# Patient Record
Sex: FEMALE | Race: BLACK OR AFRICAN AMERICAN | Employment: OTHER | URBAN - METROPOLITAN AREA
[De-identification: names, ages, dates, MRNs, and addresses within clinical notes are randomized per-mention and may not be internally consistent; named-entity substitution may affect disease eponyms.]

---

## 2017-02-02 ENCOUNTER — HOSPITAL ENCOUNTER (EMERGENCY)
Age: 37
Discharge: HOME | End: 2017-02-02
Attending: EMERGENCY MEDICINE
Payer: MEDICARE

## 2017-02-02 VITALS
HEIGHT: 62 IN | WEIGHT: 194 LBS | OXYGEN SATURATION: 96 % | RESPIRATION RATE: 16 BRPM | HEART RATE: 90 BPM | DIASTOLIC BLOOD PRESSURE: 97 MMHG | TEMPERATURE: 98.6 F | BODY MASS INDEX: 35.7 KG/M2 | SYSTOLIC BLOOD PRESSURE: 167 MMHG

## 2017-02-02 DIAGNOSIS — J06.9 ACUTE UPPER RESPIRATORY INFECTION: Primary | ICD-10-CM

## 2017-02-02 DIAGNOSIS — H66.90 ACUTE OTITIS MEDIA, UNSPECIFIED LATERALITY, UNSPECIFIED OTITIS MEDIA TYPE: ICD-10-CM

## 2017-02-02 LAB
DEPRECATED S PYO AG THROAT QL EIA: NEGATIVE
FLUAV AG NPH QL IA: NEGATIVE
FLUBV AG NOSE QL IA: NEGATIVE

## 2017-02-02 PROCEDURE — 87804 INFLUENZA ASSAY W/OPTIC: CPT | Performed by: PHYSICIAN ASSISTANT

## 2017-02-02 PROCEDURE — 99282 EMERGENCY DEPT VISIT SF MDM: CPT

## 2017-02-02 PROCEDURE — 87880 STREP A ASSAY W/OPTIC: CPT | Performed by: PHYSICIAN ASSISTANT

## 2017-02-02 PROCEDURE — 87070 CULTURE OTHR SPECIMN AEROBIC: CPT | Performed by: EMERGENCY MEDICINE

## 2017-02-02 RX ORDER — CETIRIZINE HCL 10 MG
10 TABLET ORAL DAILY
Qty: 20 TAB | Refills: 0 | Status: SHIPPED | OUTPATIENT
Start: 2017-02-02 | End: 2017-06-16

## 2017-02-02 RX ORDER — AMOXICILLIN 875 MG/1
875 TABLET, FILM COATED ORAL 2 TIMES DAILY
Qty: 20 TAB | Refills: 0 | Status: SHIPPED | OUTPATIENT
Start: 2017-02-02 | End: 2017-02-12

## 2017-02-02 NOTE — ED PROVIDER NOTES
Patient is a 29 y.o. female presenting with nasal congestion. The history is provided by the patient. Nasal Congestion   This is a new problem. Episode onset: Pt reports productive cough, nasal congestion, muffled ear, sore throat, myalgias x 3 days. Denies fever/chills. Pts children have recently been ill with URI sx. Pertinent negatives include no chest pain, no abdominal pain and no shortness of breath. She has tried nothing for the symptoms. Past Medical History:   Diagnosis Date    Abscess 7/11/2016    Delivery normal     Hypertension     Psychiatric disorder      depresssion    Tubal ligation status 12/10/15       Past Surgical History:   Procedure Laterality Date    Hx orthopaedic       tubal pregnancy removal    Hx gyn       tubal ligation         No family history on file. Social History     Social History    Marital status: UNKNOWN     Spouse name: N/A    Number of children: N/A    Years of education: N/A     Occupational History    Not on file. Social History Main Topics    Smoking status: Current Some Day Smoker     Packs/day: 0.25    Smokeless tobacco: Not on file    Alcohol use Yes      Comment: socially    Drug use: No    Sexual activity: No     Other Topics Concern    Not on file     Social History Narrative         ALLERGIES: Iodine and Shellfish containing products    Review of Systems   Constitutional: Negative for chills and fever. HENT: Positive for congestion, ear pain and sore throat. Negative for ear discharge, facial swelling, postnasal drip, rhinorrhea, sinus pressure, sneezing and trouble swallowing. Eyes: Negative for photophobia, pain and visual disturbance. Respiratory: Negative for shortness of breath. Cardiovascular: Negative for chest pain. Gastrointestinal: Negative for abdominal pain, nausea and vomiting. Genitourinary: Negative for flank pain. Musculoskeletal: Positive for myalgias. Negative for back pain.    Skin: Negative for color change, pallor, rash and wound. Neurological: Negative for dizziness, weakness and light-headedness. All other systems reviewed and are negative. Vitals:    02/02/17 1711   BP: (!) 167/97   Pulse: 90   Resp: 16   Temp: 98.6 °F (37 °C)   SpO2: 96%   Weight: 88 kg (194 lb)   Height: 5' 2\" (1.575 m)            Physical Exam   Constitutional: She is oriented to person, place, and time. She appears well-developed and well-nourished. No distress. HENT:   Head: Normocephalic and atraumatic. Right Ear: Tympanic membrane, external ear and ear canal normal.   Left Ear: No drainage, swelling or tenderness. Tympanic membrane is erythematous and bulging. Tympanic membrane is not injected, not scarred, not perforated and not retracted. No middle ear effusion. Decreased hearing is noted. Nose: Mucosal edema present. No rhinorrhea. Right sinus exhibits no maxillary sinus tenderness and no frontal sinus tenderness. Left sinus exhibits no maxillary sinus tenderness and no frontal sinus tenderness. Mouth/Throat: Uvula is midline and mucous membranes are normal. No trismus in the jaw. No uvula swelling. Posterior oropharyngeal erythema present. No oropharyngeal exudate, posterior oropharyngeal edema or tonsillar abscesses. Eyes: Conjunctivae are normal.   Cardiovascular: Normal rate, regular rhythm and normal heart sounds. Pulmonary/Chest: Effort normal and breath sounds normal. No respiratory distress. Abdominal: Soft. Bowel sounds are normal. She exhibits no distension. Musculoskeletal: Normal range of motion. Neurological: She is alert and oriented to person, place, and time. Skin: Skin is warm. No rash noted. Psychiatric: She has a normal mood and affect. Her behavior is normal.   Nursing note and vitals reviewed.        MDM  Number of Diagnoses or Management Options  Acute otitis media, unspecified laterality, unspecified otitis media type:   Acute upper respiratory infection:   Diagnosis management comments: DDx: URI, GSA vs viral pharyngitis, otitis media, otitis externa, bronchitis       Amount and/or Complexity of Data Reviewed  Clinical lab tests: ordered and reviewed      ED Course       Procedures      Chief Complaint   Patient presents with    Nasal Congestion     with productive cough, left ear muffled. sore throat. runny nose. low grade temp.            MEDICATIONS GIVEN:  Medications - No data to display    LABS REVIEWED:  Labs Reviewed   INFLUENZA A & B AG (RAPID TEST)   STREP AG SCREEN, GROUP A   CULTURE, THROAT         IMPRESSION:  1- URI  OM    PLAN:  1- Amoxicillin  Zyrtec

## 2017-02-02 NOTE — ED NOTES
Emergency Department Nursing Plan of Care       The Nursing Plan of Care is developed from the Nursing assessment and Emergency Department Attending provider initial evaluation. The plan of care may be reviewed in the ED Provider note.     The Plan of Care was developed with the following considerations:   Patient / Family readiness to learn indicated by:verbalized understanding  Persons(s) to be included in education: patient  Barriers to Learning/Limitations:No    [address] [facility]    2/2/2017   5:28 PM

## 2017-02-02 NOTE — ED NOTES
Pt arrives in the ED with complaints of nasal congestion, cough, left ear pain, and sore throat. PT denies fevers.

## 2017-02-02 NOTE — DISCHARGE INSTRUCTIONS
Upper Respiratory Infection (Cold): Care Instructions  Your Care Instructions    An upper respiratory infection, or URI, is an infection of the nose, sinuses, or throat. URIs are spread by coughs, sneezes, and direct contact. The common cold is the most frequent kind of URI. The flu and sinus infections are other kinds of URIs. Almost all URIs are caused by viruses. Antibiotics won't cure them. But you can treat most infections with home care. This may include drinking lots of fluids and taking over-the-counter pain medicine. You will probably feel better in 4 to 10 days. The doctor has checked you carefully, but problems can develop later. If you notice any problems or new symptoms, get medical treatment right away. Follow-up care is a key part of your treatment and safety. Be sure to make and go to all appointments, and call your doctor if you are having problems. It's also a good idea to know your test results and keep a list of the medicines you take. How can you care for yourself at home? · To prevent dehydration, drink plenty of fluids, enough so that your urine is light yellow or clear like water. Choose water and other caffeine-free clear liquids until you feel better. If you have kidney, heart, or liver disease and have to limit fluids, talk with your doctor before you increase the amount of fluids you drink. · Take an over-the-counter pain medicine, such as acetaminophen (Tylenol), ibuprofen (Advil, Motrin), or naproxen (Aleve). Read and follow all instructions on the label. · Before you use cough and cold medicines, check the label. These medicines may not be safe for young children or for people with certain health problems. · Be careful when taking over-the-counter cold or flu medicines and Tylenol at the same time. Many of these medicines have acetaminophen, which is Tylenol. Read the labels to make sure that you are not taking more than the recommended dose.  Too much acetaminophen (Tylenol) can be harmful. · Get plenty of rest.  · Do not smoke or allow others to smoke around you. If you need help quitting, talk to your doctor about stop-smoking programs and medicines. These can increase your chances of quitting for good. When should you call for help? Call 911 anytime you think you may need emergency care. For example, call if:  · You have severe trouble breathing. Call your doctor now or seek immediate medical care if:  · You seem to be getting much sicker. · You have new or worse trouble breathing. · You have a new or higher fever. · You have a new rash. Watch closely for changes in your health, and be sure to contact your doctor if:  · You have a new symptom, such as a sore throat, an earache, or sinus pain. · You cough more deeply or more often, especially if you notice more mucus or a change in the color of your mucus. · You do not get better as expected. Where can you learn more? Go to http://rachel-velma.info/. Enter D393 in the search box to learn more about \"Upper Respiratory Infection (Cold): Care Instructions. \"  Current as of: June 30, 2016  Content Version: 11.1  © 4848-2125 [employer]. Care instructions adapted under license by [employer] (which disclaims liability or warranty for this information). If you have questions about a medical condition or this instruction, always ask your healthcare professional. [place] [address] any warranty or liability for your use of this information. Ear Infection (Otitis Media): Care Instructions  Your Care Instructions    An ear infection may start with a cold and affect the middle ear (otitis media). It can hurt a lot. Most ear infections clear up on their own in a couple of days. Most often you will not need antibiotics. This is because many ear infections are caused by a virus. Antibiotics don't work against a virus.  Regular doses of pain medicines are the best way to reduce your fever and help you feel better. Follow-up care is a key part of your treatment and safety. Be sure to make and go to all appointments, and call your doctor if you are having problems. It's also a good idea to know your test results and keep a list of the medicines you take. How can you care for yourself at home? · Take pain medicines exactly as directed. ¨ If the doctor gave you a prescription medicine for pain, take it as prescribed. ¨ If you are not taking a prescription pain medicine, take an over-the-counter medicine, such as acetaminophen (Tylenol), ibuprofen (Advil, Motrin), or naproxen (Aleve). Read and follow all instructions on the label. ¨ Do not take two or more pain medicines at the same time unless the doctor told you to. Many pain medicines have acetaminophen, which is Tylenol. Too much acetaminophen (Tylenol) can be harmful. · Plan to take a full dose of pain reliever before bedtime. Getting enough sleep will help you get better. · Try a warm, moist washcloth on the ear. It may help relieve pain. · If your doctor prescribed antibiotics, take them as directed. Do not stop taking them just because you feel better. You need to take the full course of antibiotics. When should you call for help? Call your doctor now or seek immediate medical care if:  · You have new or increasing ear pain. · You have new or increasing pus or blood draining from your ear. · You have a fever with a stiff neck or a severe headache. Watch closely for changes in your health, and be sure to contact your doctor if:  · You have new or worse symptoms. · You are not getting better after taking an antibiotic for 2 days. Where can you learn more? Go to http://rachel-velma.info/. Enter L590 in the search box to learn more about \"Ear Infection (Otitis Media): Care Instructions. \"  Current as of: July 29, 2016  Content Version: 11.1  © 3804-1739 [employer].  Care instructions adapted under license by [employer] (which disclaims liability or warranty for this information). If you have questions about a medical condition or this instruction, always ask your healthcare professional. [address] any warranty or liability for your use of this information.

## 2017-02-04 LAB
BACTERIA SPEC CULT: NORMAL
SERVICE CMNT-IMP: NORMAL

## 2017-02-28 ENCOUNTER — DOCUMENTATION ONLY (OUTPATIENT)
Dept: BEHAVIORAL/MENTAL HEALTH CLINIC | Age: 37
End: 2017-02-28

## 2017-02-28 NOTE — LETTER
2/28/2017 Ms. Gretta Horvath 
[address] [facility] [place] [facility] [id number] Dear Ms. Andrez Reaves, 
 
A good relationship between provider and patient is essential for quality medical care. The provider/patient relationship depends upon mutual trust, respect, and rapport. Our relationship has reached a point where these criteria are no longer intact. You have consistently failed to come in for scheduled appointments (No Show: 12/27/16, 2/28/17). For this reasons, I will no longer continue to serve as your provider and [address] must withdraw from your medical care and treatment effective this date. Although I have terminated our providerpatient relationship, I will assist you with the transition of your health care to other providers, as follows: 
 
1. On-Going/Acute Care. I will provide on-going/acute care for you for a period of 30 days, but in no case later than 3/29/17. After this date, you may seek care with another provider or your local emergency room. 2. Referrals for Future Medical Care. As you may need medical attention in the future, I recommend that you promptly find another provider to care for you. You may require ongoing medical attention for any current or future medical conditions, including but not limited to the following: depression/ anxiety. You may contact your insurance company or the [employer]  for a [employer] [facility] [employer] of physicians who are accepting new patients 3. Medical Records. I will provide you or your new health care provider with a copy of your records upon your request.  Since your records are confidential, Ill require your written authorization to make them available to another provider. Enclosed is an authorization form. Please complete it and return it to me so that we may transition your care. I extend to you my best wishes for your future health.  
 
Sincerely, 
 
 
 
 Elta Ormond, NP [place]

## 2017-03-16 ENCOUNTER — OFFICE VISIT (OUTPATIENT)
Dept: CARDIOLOGY CLINIC | Age: 37
End: 2017-03-16

## 2017-03-16 VITALS
OXYGEN SATURATION: 99 % | DIASTOLIC BLOOD PRESSURE: 99 MMHG | SYSTOLIC BLOOD PRESSURE: 143 MMHG | BODY MASS INDEX: 36.07 KG/M2 | WEIGHT: 196 LBS | HEIGHT: 62 IN | HEART RATE: 70 BPM

## 2017-03-16 DIAGNOSIS — R07.9 CHEST PAIN, UNSPECIFIED TYPE: ICD-10-CM

## 2017-03-16 DIAGNOSIS — I51.7 LEFT VENTRICULAR HYPERTROPHY: ICD-10-CM

## 2017-03-16 DIAGNOSIS — I10 ESSENTIAL HYPERTENSION: Primary | ICD-10-CM

## 2017-03-16 RX ORDER — LABETALOL 100 MG/1
100 TABLET, FILM COATED ORAL 2 TIMES DAILY
Qty: 60 TAB | Refills: 1 | Status: SHIPPED | OUTPATIENT
Start: 2017-03-16 | End: 2017-09-08 | Stop reason: SDUPTHER

## 2017-03-16 RX ORDER — BUDESONIDE AND FORMOTEROL FUMARATE DIHYDRATE 80; 4.5 UG/1; UG/1
AEROSOL RESPIRATORY (INHALATION)
Refills: 0 | COMMUNITY
Start: 2017-02-17 | End: 2017-06-16

## 2017-03-16 RX ORDER — AMOXICILLIN AND CLAVULANATE POTASSIUM 875; 125 MG/1; MG/1
TABLET, FILM COATED ORAL
Refills: 0 | COMMUNITY
Start: 2017-02-16 | End: 2017-04-19 | Stop reason: ALTCHOICE

## 2017-03-16 NOTE — MR AVS SNAPSHOT
Visit Information Date & Time Provider Department Dept. Phone Encounter #  
 3/16/2017  9:20 AM Nirmal Jennings MD [facility] [id number] Upcoming Health Maintenance Date Due Pneumococcal 19-64 Medium Risk (1 of 1 - PPSV23) 2/26/2000 PAP AKA CERVICAL CYTOLOGY 2/26/2002 INFLUENZA AGE 9 TO ADULT 8/1/2016 DTaP/Tdap/Td series (2 - Td) 12/10/2025 Allergies as of 3/16/2017  Review Complete On: 3/16/2017 By: Isabela Deras Severity Noted Reaction Type Reactions Iodine  07/13/2010    Itching Shellfish Containing Products  07/13/2010    Itching Current Immunizations  Never Reviewed Name Date  
 TD Vaccine 7/27/2011  1:09 AM  
 Tdap 12/10/2015  2:53 PM  
  
 Not reviewed this visit You Were Diagnosed With   
  
 Codes Comments Essential hypertension    -  Primary ICD-10-CM: I10 
ICD-9-CM: 401.9 Chest pain, unspecified type     ICD-10-CM: R07.9 ICD-9-CM: 786.50 Left ventricular hypertrophy     ICD-10-CM: I51.7 ICD-9-CM: 429.3 Vitals BP Pulse Height(growth percentile) Weight(growth percentile) SpO2 BMI  
 (!) 143/99 70 5' 2\" (1.575 m) 196 lb (88.9 kg) 99% 35.85 kg/m2 OB Status Smoking Status Having regular periods Current Some Day Smoker Vitals History BMI and BSA Data Body Mass Index Body Surface Area  
 35.85 kg/m 2 1.97 m 2 Preferred Pharmacy Pharmacy Name Phone [address] [phone] Your Updated Medication List  
  
   
This list is accurate as of: 3/16/17 10:56 AM.  Always use your most recent med list. amLODIPine 10 mg tablet Commonly known as:  Love Breach Take 1 Tab by mouth daily. amoxicillin-clavulanate 875-125 mg per tablet Commonly known as:  AUGMENTIN  
take 1 tablet by mouth every 12 hours for 10 days  
  
 aspirin delayed-release 81 mg tablet Take  by mouth daily. cetirizine 10 mg tablet Commonly known as:  ZyrTEC Take 1 Tab by mouth daily. cloNIDine HCl 0.2 mg tablet Commonly known as:  CATAPRES Take 1 Tab by mouth two (2) times a day. Indications: Hypertension Ferrous Fumarate 325 mg (106 mg iron) Tab Take 325 mg by mouth. FISH OIL 1,000 mg Cap Generic drug:  omega-3 fatty acids-vitamin e Take 1 Cap by mouth daily. lidocaine 5 % ointment Commonly known as:  XYLOCAINE Apply  to affected area two (2) times a day. losartan 100 mg tablet Commonly known as:  COZAAR Take 1 Tab by mouth daily. Indications: Hypertension PROAIR HFA 90 mcg/actuation inhaler Generic drug:  albuterol  
inhale 2 puffs by mouth every 4 hours Q-TUSSIN -10 mg/5 mL syrup Generic drug:  guaiFENesin-dextromethorphan  
take 10 milliliters by mouth every 4 hours for 10 days SYMBICORT 80-4.5 mcg/actuation Hfaa inhaler Generic drug:  budesonide-formoterol  
  
 triamcinolone acetonide 0.1 % ointment Commonly known as:  KENALOG  
apply to affected area twice a day if needed for eczema We Performed the Following AMB POC EKG ROUTINE W/ 12 LEADS, INTER & REP [87098 CPT(R)] Introducing [facility] SERVICES! [employer] introduces [employer] patient portal. Now you can access parts of your medical record, email your doctor's office, and request medication refills online. 1. In your internet browser, go to [email or web address] 2. Click on the First Time User? Click Here link in the Sign In box. You will see the New Member Sign Up page. 3. Enter your [employer] Access Code exactly as it appears below. You will not need to use this code after youve completed the sign-up process. If you do not sign up before the expiration date, you must request a new code. · [employer] Access Code: [email or web address] Expires: 5/7/2017 12:55 PM 
 
 4. Enter the last four digits of your Social Security Number (xxxx) and Date of Birth (mm/dd/yyyy) as indicated and click Submit. You will be taken to the next sign-up page. 5. Create a Snapwiz ID. This will be your Snapwiz login ID and cannot be changed, so think of one that is secure and easy to remember. 6. Create a Snapwiz password. You can change your password at any time. 7. Enter your Password Reset Question and Answer. This can be used at a later time if you forget your password. 8. Enter your e-mail address. You will receive e-mail notification when new information is available in [address] 9. Click Sign Up. You can now view and download portions of your medical record. 10. Click the Download Summary menu link to download a portable copy of your medical information. If you have questions, please visit the Frequently Asked Questions section of the Snapwiz website. Remember, Snapwiz is NOT to be used for urgent needs. For medical emergencies, dial 911. Now available from your iPhone and Android! Please provide this summary of care documentation to your next provider. Your primary care clinician is listed as Harry Gamboa. If you have any questions after today's visit, please call [phone].

## 2017-03-16 NOTE — PROGRESS NOTES
[facility]   [address] [facility], [address] [place] [address]                                          NEW PATIENT HPI/FOLLOW-UP      NAME:  Sharmaine Carrel   :   1980   MRN:   [id number]   PCP:  Rashad Sam MD           Subjective: The patient is a 40y.o. year old female  who returns for a routine follow-up. Since the last visit, patient reports BPs are consistently in 160s/90s range. Lowest BPs are 140s/90s. She reports compliance on meds. She reports headache and nosebleeds when BP are in 528D systolic. BP clearly not adequately controlled. Still has intermittent CP that is constant, sharp and lasts for 2-3 days at a time. She did not present to ER with most recent episode. States by day three of the chest pain she was convinced to go and the pain suddenly stopped. She tried no interventions. She denies exacerbating or alleviating factors. She reports associated SOB and ankle edema. Denies change in exercise tolerance, medication intolerance, palpitations, PND/orthopnea wheezing, sputum, syncope, dizziness or light headedness. Doing satisfactorily. Review of Systems  General: Pt denies excessive weight gain or loss. Pt is able to conduct ADL's. Respiratory: +shortness of breath, Denies SMITH, wheezing or stridor.   Cardiovascular: +precordial pain, Denies palpitations, edema or PND  Gastrointestinal: Denies poor appetite, indigestion, abdominal pain or blood in stool  Peripheral vascular: Denies claudication, leg cramps  Neuropsychiatric: Denies paresthesias,tingling,numbness,anxiety,depression,fatigue  Musculoskeletal: Denies pain,tenderness, soreness,swelling      Past Medical History:   Diagnosis Date    Abscess 2016    Delivery normal     Hypertension     Psychiatric disorder     depresssion    Tubal ligation status 12/10/15     Patient Active Problem List    Diagnosis Date Noted    Chest wall pain 2016    Essential hypertension 2016    [facility] 11/03/2016    Alcohol abuse 11/03/2016    Cannabis abuse 11/03/2016    Abscess 07/11/2016      Past Surgical History:   Procedure Laterality Date    HX GYN      tubal ligation    HX ORTHOPAEDIC      tubal pregnancy removal     Allergies   Allergen Reactions    Iodine Itching    Shellfish Containing Products Itching      No family history on file. Social History     Social History    Marital status: UNKNOWN     Spouse name: N/A    Number of children: N/A    Years of education: N/A     Occupational History    Not on file. Social History Main Topics    Smoking status: Current Some Day Smoker     Packs/day: 0.25    Smokeless tobacco: Not on file    Alcohol use Yes      Comment: socially    Drug use: No    Sexual activity: No     Other Topics Concern    Not on file     Social History Narrative      Current Outpatient Prescriptions   Medication Sig    cetirizine (ZYRTEC) 10 mg tablet Take 1 Tab by mouth daily.  lidocaine (XYLOCAINE) 5 % ointment Apply  to affected area two (2) times a day.  cloNIDine HCl (CATAPRES) 0.2 mg tablet Take 1 Tab by mouth two (2) times a day. Indications: Hypertension    amLODIPine (NORVASC) 10 mg tablet Take 1 Tab by mouth daily.  losartan (COZAAR) 100 mg tablet Take 1 Tab by mouth daily. Indications: Hypertension    omega-3 fatty acids-vitamin e (FISH OIL) 1,000 mg cap Take 1 Cap by mouth daily.  aspirin delayed-release 81 mg tablet Take  by mouth daily.  PROAIR HFA 90 mcg/actuation inhaler inhale 2 puffs by mouth every 4 hours    triamcinolone acetonide (KENALOG) 0.1 % ointment apply to affected area twice a day if needed for eczema    Ferrous Fumarate 325 mg (106 mg iron) Tab Take 325 mg by mouth.       amoxicillin-clavulanate (AUGMENTIN) 875-125 mg per tablet take 1 tablet by mouth every 12 hours for 10 days    SYMBICORT 80-4.5 mcg/actuation HFAA inhaler     Q-TUSSIN DM  mg/5 mL syrup take 10 milliliters by mouth every 4 hours for 10 days     No current facility-administered medications for this visit. I have reviewed the MAs notes, vitals, problem list, allergy list, medical history, family medical, social history and medications. Objective:     Physical Exam:     Vitals:    03/16/17 0955   BP: (!) 143/99   Pulse: 70   SpO2: 99%   Weight: 196 lb (88.9 kg)   Height: 5' 2\" (1.575 m)    Body mass index is 35.85 kg/(m^2). General: Well developed, in no acute distress. HEENT: No carotid bruits, no JVD, trach is midline. Heart:  Normal S1/S2 negative S3 or S4. Regular, no murmur, gallop or rub.   Respiratory: Clear bilaterally, no wheezing or rales  Abdomen:   Soft, non-tender, bowel sounds are active.   Extremities:  No edema, normal cap refill, no cyanosis. Neuro: A&Ox3, speech clear, gait stable. Skin: Skin color is normal. No rashes or lesions. No diaphoresis. Vascular: 2+ pulses symmetric in all extremities        Data Review:       Cardiographics:    EKG: NSR, leftward axis; with LVH criteria, TWI may represent LVH strain.     Cardiology Labs:    Results for orders placed or performed during the hospital encounter of 11/06/16   EKG, 12 LEAD, INITIAL   Result Value Ref Range    Ventricular Rate 84 BPM    Atrial Rate 84 BPM    P-R Interval 156 ms    QRS Duration 82 ms    Q-T Interval 400 ms    QTC Calculation (Bezet) 472 ms    Calculated P Axis 36 degrees    Calculated R Axis 1 degrees    Calculated T Axis 135 degrees    Diagnosis       Normal sinus rhythm  Possible Left atrial enlargement  Left ventricular hypertrophy with repolarization abnormality  Cannot rule out Septal infarct , age undetermined    Confirmed by Andrez Peña ([id number]) on 11/7/2016 1:54:12 PM         No results found for: CHOL, CHOLX, CHLST, CHOLV, 299913, HDL, LDL, DLDL, LDLC, DLDLP, TGL, TGLX, TRIGL, [id number], TRIGP, CHHD, CHHDX    Lab Results   Component Value Date/Time    Sodium 138 11/06/2016 06:08 PM    Potassium 3.3 11/06/2016 06:08 PM    Chloride 105 11/06/2016 06:08 PM    CO2 24 11/06/2016 06:08 PM    Anion gap 9 11/06/2016 06:08 PM    Glucose 98 11/06/2016 06:08 PM    BUN 17 11/06/2016 06:08 PM    Creatinine 0.85 11/06/2016 06:08 PM    BUN/Creatinine ratio 20 11/06/2016 06:08 PM    GFR est AA >60 11/06/2016 06:08 PM    GFR est non-AA >60 11/06/2016 06:08 PM    Calcium 8.2 11/06/2016 06:08 PM    Bilirubin, total 0.3 11/06/2016 06:08 PM    AST (SGOT) 18 11/06/2016 06:08 PM    Alk. phosphatase 67 11/06/2016 06:08 PM    Protein, total 7.1 11/06/2016 06:08 PM    Albumin 3.4 11/06/2016 06:08 PM    Globulin 3.7 11/06/2016 06:08 PM    A-G Ratio 0.9 11/06/2016 06:08 PM    ALT (SGPT) 46 11/06/2016 06:08 PM          Assessment:       ICD-10-CM ICD-9-CM    1. Essential hypertension I10 401.9 AMB POC EKG ROUTINE W/ 12 LEADS, INTER & REP   2. Chest pain, unspecified type R07.9 786.50 AMB POC EKG ROUTINE W/ 12 LEADS, INTER & REP   3. Left ventricular hypertrophy I51.7 429.3          Discussion: Patient presents at this time stable from a cardiac perspective with BP still not adequately controlled. Plan:   Discussed with Dr. Mariana Licea    1. Continue same meds. Lipid profile and labs followed by PCP. -- ADD Labetalol 100 mg BID    2. Encouraged to exercise to tolerance, lose weight, stop smoking and follow low fat, low cholesterol, low sodium predominantly Plant-based (consider Mediterranean) diet. 3.Follow up: 1 month to reassess medications and BP   -- Call with questions or concerns. Will follow up any test results by phone and/or f/u here in office if needed. I have discussed the diagnosis with the patient and the intended plan as seen in the above orders. The patient has received an after-visit summary and questions were answered concerning future plans. I have discussed any concerning medication side effects and warnings with the patient as well.     Jassi Pepe PA-C  3/16/2017

## 2017-04-19 ENCOUNTER — OFFICE VISIT (OUTPATIENT)
Dept: CARDIOLOGY CLINIC | Age: 37
End: 2017-04-19

## 2017-04-19 VITALS
HEART RATE: 74 BPM | OXYGEN SATURATION: 99 % | DIASTOLIC BLOOD PRESSURE: 78 MMHG | BODY MASS INDEX: 36.07 KG/M2 | SYSTOLIC BLOOD PRESSURE: 134 MMHG | WEIGHT: 196 LBS | HEIGHT: 62 IN

## 2017-04-19 DIAGNOSIS — R06.02 SOB (SHORTNESS OF BREATH): ICD-10-CM

## 2017-04-19 DIAGNOSIS — I10 ESSENTIAL HYPERTENSION: Primary | ICD-10-CM

## 2017-04-19 DIAGNOSIS — R07.9 CHEST PAIN, UNSPECIFIED TYPE: ICD-10-CM

## 2017-04-19 RX ORDER — VALSARTAN 40 MG/1
160 TABLET ORAL DAILY
Qty: 30 TAB | Refills: 3 | Status: SHIPPED | OUTPATIENT
Start: 2017-04-19 | End: 2017-06-21 | Stop reason: SDUPTHER

## 2017-04-19 NOTE — PATIENT INSTRUCTIONS
-- STOP Amlodipine and Losartan  -- START a new medication called Diovan  -- Call us with any high blood pressure readings, otherwise please make a 1 month follow up         DASH Diet: Care Instructions  Your Care Instructions  The DASH diet is an eating plan that can help lower your blood pressure. DASH stands for Dietary Approaches to Stop Hypertension. Hypertension is high blood pressure. The DASH diet focuses on eating foods that are high in calcium, potassium, and magnesium. These nutrients can lower blood pressure. The foods that are highest in these nutrients are fruits, vegetables, low-fat dairy products, nuts, seeds, and legumes. But taking calcium, potassium, and magnesium supplements instead of eating foods that are high in those nutrients does not have the same effect. The DASH diet also includes whole grains, fish, and poultry. The DASH diet is one of several lifestyle changes your doctor may recommend to lower your high blood pressure. Your doctor may also want you to decrease the amount of sodium in your diet. Lowering sodium while following the DASH diet can lower blood pressure even further than just the DASH diet alone. Follow-up care is a key part of your treatment and safety. Be sure to make and go to all appointments, and call your doctor if you are having problems. It's also a good idea to know your test results and keep a list of the medicines you take. How can you care for yourself at home? Following the DASH diet  · Eat 4 to 5 servings of fruit each day. A serving is 1 medium-sized piece of fruit, ½ cup chopped or canned fruit, 1/4 cup dried fruit, or 4 ounces (½ cup) of fruit juice. Choose fruit more often than fruit juice. · Eat 4 to 5 servings of vegetables each day. A serving is 1 cup of lettuce or raw leafy vegetables, ½ cup of chopped or cooked vegetables, or 4 ounces (½ cup) of vegetable juice. Choose vegetables more often than vegetable juice.   · Get 2 to 3 servings of low-fat and fat-free dairy each day. A serving is 8 ounces of milk, 1 cup of yogurt, or 1 ½ ounces of cheese. · Eat 6 to 8 servings of grains each day. A serving is 1 slice of bread, 1 ounce of dry cereal, or ½ cup of cooked rice, pasta, or cooked cereal. Try to choose whole-grain products as much as possible. · Limit lean meat, poultry, and fish to 2 servings each day. A serving is 3 ounces, about the size of a deck of cards. · Eat 4 to 5 servings of nuts, seeds, and legumes (cooked dried beans, lentils, and split peas) each week. A serving is 1/3 cup of nuts, 2 tablespoons of seeds, or ½ cup of cooked beans or peas. · Limit fats and oils to 2 to 3 servings each day. A serving is 1 teaspoon of vegetable oil or 2 tablespoons of salad dressing. · Limit sweets and added sugars to 5 servings or less a week. A serving is 1 tablespoon jelly or jam, ½ cup sorbet, or 1 cup of lemonade. · Eat less than 2,300 milligrams (mg) of sodium a day. If you limit your sodium to 1,500 mg a day, you can lower your blood pressure even more. Tips for success  · Start small. Do not try to make dramatic changes to your diet all at once. You might feel that you are missing out on your favorite foods and then be more likely to not follow the plan. Make small changes, and stick with them. Once those changes become habit, add a few more changes. · Try some of the following:  ¨ Make it a goal to eat a fruit or vegetable at every meal and at snacks. This will make it easy to get the recommended amount of fruits and vegetables each day. ¨ Try yogurt topped with fruit and nuts for a snack or healthy dessert. ¨ Add lettuce, tomato, cucumber, and onion to sandwiches. ¨ Combine a ready-made pizza crust with low-fat mozzarella cheese and lots of vegetable toppings. Try using tomatoes, squash, spinach, broccoli, carrots, cauliflower, and onions.   ¨ Have a variety of cut-up vegetables with a low-fat dip as an appetizer instead of chips and dip.  ¨ Sprinkle sunflower seeds or chopped almonds over salads. Or try adding chopped walnuts or almonds to cooked vegetables. ¨ Try some vegetarian meals using beans and peas. Add garbanzo or kidney beans to salads. Make burritos and tacos with mashed salas beans or black beans. Where can you learn more? Go to http://rachel-velma.info/. Enter Z096 in the search box to learn more about \"DASH Diet: Care Instructions. \"  Current as of: March 23, 2016  Content Version: 11.2  © 4046-0645 [employer]. Care instructions adapted under license by [employer] (which disclaims liability or warranty for this information). If you have questions about a medical condition or this instruction, always ask your healthcare professional. [address] any warranty or liability for your use of this information.

## 2017-04-19 NOTE — MR AVS SNAPSHOT
Visit Information Date & Time Provider Department Dept. Phone Encounter #  
 4/19/2017  1:20 PM Denise Paget, MD [facility] [phone] [id number] Upcoming Health Maintenance Date Due Pneumococcal 19-64 Medium Risk (1 of 1 - PPSV23) 2/26/2000 PAP AKA CERVICAL CYTOLOGY 2/26/2002 INFLUENZA AGE 9 TO ADULT 8/1/2016 DTaP/Tdap/Td series (2 - Td) 12/10/2025 Allergies as of 4/19/2017  Review Complete On: 4/19/2017 By: Krystian Nowak PA-C Severity Noted Reaction Type Reactions Iodine  07/13/2010    Itching Shellfish Containing Products  07/13/2010    Itching Current Immunizations  Never Reviewed Name Date  
 TD Vaccine 7/27/2011  1:09 AM  
 Tdap 12/10/2015  2:53 PM  
  
 Not reviewed this visit You Were Diagnosed With   
  
 Codes Comments Essential hypertension    -  Primary ICD-10-CM: I10 
ICD-9-CM: 401.9 Chest pain, unspecified type     ICD-10-CM: R07.9 ICD-9-CM: 786.50 SOB (shortness of breath)     ICD-10-CM: R06.02 
ICD-9-CM: 786.05 Vitals BP Pulse Height(growth percentile) Weight(growth percentile) SpO2 BMI  
 134/78 74 5' 2\" (1.575 m) 196 lb (88.9 kg) 99% 35.85 kg/m2 OB Status Smoking Status Having regular periods Current Some Day Smoker Vitals History BMI and BSA Data Body Mass Index Body Surface Area  
 35.85 kg/m 2 1.97 m 2 Preferred Pharmacy Pharmacy Name Phone [facility] [address] [facility] [phone] Your Updated Medication List  
  
   
This list is accurate as of: 4/19/17  2:33 PM.  Always use your most recent med list.  
  
  
  
  
 aspirin delayed-release 81 mg tablet Take  by mouth daily. cetirizine 10 mg tablet Commonly known as:  ZyrTEC Take 1 Tab by mouth daily. cloNIDine HCl 0.2 mg tablet Commonly known as:  CATAPRES  
 Take 1 Tab by mouth two (2) times a day. Indications: Hypertension Ferrous Fumarate 325 mg (106 mg iron) Tab Take 325 mg by mouth. FISH OIL 1,000 mg Cap Generic drug:  omega-3 fatty acids-vitamin e Take 1 Cap by mouth daily. labetalol 100 mg tablet Commonly known as:  Maria Ines Stall Take 1 Tab by mouth two (2) times a day. lidocaine 5 % ointment Commonly known as:  XYLOCAINE Apply  to affected area two (2) times a day. PROAIR HFA 90 mcg/actuation inhaler Generic drug:  albuterol  
inhale 2 puffs by mouth every 4 hours SYMBICORT 80-4.5 mcg/actuation Hfaa inhaler Generic drug:  budesonide-formoterol  
  
 triamcinolone acetonide 0.1 % ointment Commonly known as:  KENALOG  
apply to affected area twice a day if needed for eczema  
  
 valsartan 40 mg tablet Commonly known as:  DIOVAN Take 4 Tabs by mouth daily. Indications: hypertension Prescriptions Sent to Pharmacy Refills  
 valsartan (DIOVAN) 40 mg tablet 3 Sig: Take 4 Tabs by mouth daily. Indications: hypertension Class: Normal  
 Pharmacy: [facility] [address] [place] [address] #: [phone] Route: Oral  
  
Patient Instructions -- STOP Amlodipine and Losartan 
-- START a new medication called Diovan 
-- Call us with any high blood pressure readings, otherwise please make a 1 month follow up DASH Diet: Care Instructions Your Care Instructions The DASH diet is an eating plan that can help lower your blood pressure. DASH stands for Dietary Approaches to Stop Hypertension. Hypertension is high blood pressure. The DASH diet focuses on eating foods that are high in calcium, potassium, and magnesium. These nutrients can lower blood pressure. The foods that are highest in these nutrients are fruits, vegetables, low-fat dairy products, nuts, seeds, and legumes.  But taking calcium, potassium, and magnesium supplements instead of eating foods that are high in those nutrients does not have the same effect. The DASH diet also includes whole grains, fish, and poultry. The DASH diet is one of several lifestyle changes your doctor may recommend to lower your high blood pressure. Your doctor may also want you to decrease the amount of sodium in your diet. Lowering sodium while following the DASH diet can lower blood pressure even further than just the DASH diet alone. Follow-up care is a key part of your treatment and safety. Be sure to make and go to all appointments, and call your doctor if you are having problems. It's also a good idea to know your test results and keep a list of the medicines you take. How can you care for yourself at home? Following the DASH diet · Eat 4 to 5 servings of fruit each day. A serving is 1 medium-sized piece of fruit, ½ cup chopped or canned fruit, 1/4 cup dried fruit, or 4 ounces (½ cup) of fruit juice. Choose fruit more often than fruit juice. · Eat 4 to 5 servings of vegetables each day. A serving is 1 cup of lettuce or raw leafy vegetables, ½ cup of chopped or cooked vegetables, or 4 ounces (½ cup) of vegetable juice. Choose vegetables more often than vegetable juice. · Get 2 to 3 servings of low-fat and fat-free dairy each day. A serving is 8 ounces of milk, 1 cup of yogurt, or 1 ½ ounces of cheese. · Eat 6 to 8 servings of grains each day. A serving is 1 slice of bread, 1 ounce of dry cereal, or ½ cup of cooked rice, pasta, or cooked cereal. Try to choose whole-grain products as much as possible. · Limit lean meat, poultry, and fish to 2 servings each day. A serving is 3 ounces, about the size of a deck of cards. · Eat 4 to 5 servings of nuts, seeds, and legumes (cooked dried beans, lentils, and split peas) each week. A serving is 1/3 cup of nuts, 2 tablespoons of seeds, or ½ cup of cooked beans or peas. · Limit fats and oils to 2 to 3 servings each day. A serving is 1 teaspoon of vegetable oil or 2 tablespoons of salad dressing. · Limit sweets and added sugars to 5 servings or less a week. A serving is 1 tablespoon jelly or jam, ½ cup sorbet, or 1 cup of lemonade. · Eat less than 2,300 milligrams (mg) of sodium a day. If you limit your sodium to 1,500 mg a day, you can lower your blood pressure even more. Tips for success · Start small. Do not try to make dramatic changes to your diet all at once. You might feel that you are missing out on your favorite foods and then be more likely to not follow the plan. Make small changes, and stick with them. Once those changes become habit, add a few more changes. · Try some of the following: ¨ Make it a goal to eat a fruit or vegetable at every meal and at snacks. This will make it easy to get the recommended amount of fruits and vegetables each day. ¨ Try yogurt topped with fruit and nuts for a snack or healthy dessert. ¨ Add lettuce, tomato, cucumber, and onion to sandwiches. ¨ Combine a ready-made pizza crust with low-fat mozzarella cheese and lots of vegetable toppings. Try using tomatoes, squash, spinach, broccoli, carrots, cauliflower, and onions. ¨ Have a variety of cut-up vegetables with a low-fat dip as an appetizer instead of chips and dip. ¨ Sprinkle sunflower seeds or chopped almonds over salads. Or try adding chopped walnuts or almonds to cooked vegetables. ¨ Try some vegetarian meals using beans and peas. Add garbanzo or kidney beans to salads. Make burritos and tacos with mashed salas beans or black beans. Where can you learn more? Go to http://rachel-velma.info/. Enter X921 in the search box to learn more about \"DASH Diet: Care Instructions. \" Current as of: March 23, 2016 Content Version: 11.2 © 7569-2110 [employer].  Care instructions adapted under license by [address] (which disclaims liability or warranty for this information). If you have questions about a medical condition or this instruction, always ask your healthcare professional. [address] any warranty or liability for your use of this information. Introducing \[facility] [place] [facility]\"" & HEALTH SERVICES! Ella Angeles introduces [email or web address] patient portal. Now you can access parts of your medical record, email your doctor's office, and request medication refills online. 1. In your internet browser, go to [email or web address] 2. Click on the First Time User? Click Here link in the Sign In box. You will see the New Member Sign Up page. 3. Enter your [email or web address] Access Code exactly as it appears below. You will not need to use this code after youve completed the sign-up process. If you do not sign up before the expiration date, you must request a new code. · [email or web address] Access Code: [email or web address] Expires: 5/7/2017 12:55 PM 
 
4. Enter the last four digits of your Social Security Number (xxxx) and Date of Birth (mm/dd/yyyy) as indicated and click Submit. You will be taken to the next sign-up page. 5. Create a [email or web address] ID. This will be your [email or web address] login ID and cannot be changed, so think of one that is secure and easy to remember. 6. Create a [email or web address] password. You can change your password at any time. 7. Enter your Password Reset Question and Answer. This can be used at a later time if you forget your password. 8. Enter your e-mail address. You will receive e-mail notification when new information is available in [address] 9. Click Sign Up. You can now view and download portions of your medical record. 10. Click the Download Summary menu link to download a portable copy of your medical information. If you have questions, please visit the Frequently Asked Questions section of the [email or web address] website.  Remember, [email or web address] is NOT to be used for urgent needs. For medical emergencies, dial 911. Now available from your iPhone and Android! Please provide this summary of care documentation to your next provider. Your primary care clinician is listed as Pilar Barnes. If you have any questions after today's visit, please call [phone].

## 2017-04-19 NOTE — PROGRESS NOTES
[facility]   [address] [facility], [address] [place] [address] Road                                          NEW PATIENT HPI/FOLLOW-UP      NAME:  Maty Sims   :   1980   MRN:   [id number]   PCP:  Clement Jose MD           Subjective: The patient is a 40y.o. year old female  who returns for a routine follow-up. Since the last visit, patient reports improvement in home BPs. At last visit, we added 100 mg Labetalol BID. She reports continued LE swelling, primarily located in her ankles and has been using someone else's HCTZ occasionally to get rid of the fluid. I advised this was not a smart way to attack the problem, especially given her LVH. She report no new cardiac symptoms. Denies change in exercise tolerance, chest pain, medication intolerance, palpitations, shortness of breath, PND/orthopnea wheezing, sputum, syncope, dizziness or light headedness. Doing satisfactorily. Review of Systems  General: Pt denies excessive weight gain or loss. Pt is able to conduct ADL's. Respiratory: Denies shortness of breath, SMITH, wheezing or stridor.   Cardiovascular: +edema Denies precordial pain, palpitations, or PND  Gastrointestinal: Denies poor appetite, indigestion, abdominal pain or blood in stool  Peripheral vascular: Denies claudication, leg cramps  Neuropsychiatric: Denies paresthesias,tingling,numbness,anxiety,depression,fatigue  Musculoskeletal: Denies pain,tenderness, soreness,swelling      Past Medical History:   Diagnosis Date    Abscess 2016    Delivery normal     Hypertension     Psychiatric disorder     depresssion    Tubal ligation status 12/10/15     Patient Active Problem List    Diagnosis Date Noted    Chest wall pain 2016    Essential hypertension 2016    Mood disorder ([facility] [address] 2016    Alcohol abuse 2016    Cannabis abuse 2016    Abscess 2016      Past Surgical History:   Procedure Laterality Date    HX GYN      tubal ligation    HX ORTHOPAEDIC      tubal pregnancy removal     Allergies   Allergen Reactions    Iodine Itching    Shellfish Containing Products Itching      History reviewed. No pertinent family history. Social History     Social History    Marital status: UNKNOWN     Spouse name: N/A    Number of children: N/A    Years of education: N/A     Occupational History    Not on file. Social History Main Topics    Smoking status: Current Some Day Smoker     Packs/day: 0.25    Smokeless tobacco: Not on file    Alcohol use Yes      Comment: socially    Drug use: No    Sexual activity: No     Other Topics Concern    Not on file     Social History Narrative      Current Outpatient Prescriptions   Medication Sig    valsartan (DIOVAN) 40 mg tablet Take 4 Tabs by mouth daily. Indications: hypertension    SYMBICORT 80-4.5 mcg/actuation HFAA inhaler     labetalol (NORMODYNE) 100 mg tablet Take 1 Tab by mouth two (2) times a day.  cetirizine (ZYRTEC) 10 mg tablet Take 1 Tab by mouth daily.  lidocaine (XYLOCAINE) 5 % ointment Apply  to affected area two (2) times a day.  cloNIDine HCl (CATAPRES) 0.2 mg tablet Take 1 Tab by mouth two (2) times a day. Indications: Hypertension    omega-3 fatty acids-vitamin e (FISH OIL) 1,000 mg cap Take 1 Cap by mouth daily.  aspirin delayed-release 81 mg tablet Take  by mouth daily.  PROAIR HFA 90 mcg/actuation inhaler inhale 2 puffs by mouth every 4 hours    triamcinolone acetonide (KENALOG) 0.1 % ointment apply to affected area twice a day if needed for eczema    Ferrous Fumarate 325 mg (106 mg iron) Tab Take 325 mg by mouth. No current facility-administered medications for this visit. I have reviewed the MAs notes, vitals, problem list, allergy list, medical history, family medical, social history and medications.       Objective:     Physical Exam:     Vitals:    04/19/17 1350   BP: 134/78   Pulse: 74   SpO2: 99%   Weight: 196 lb (88.9 kg) Height: 5' 2\" (1.575 m)    Body mass index is 35.85 kg/(m^2). General: WDWN adult female, in no acute distress. Pleasant affect. HEENT: No carotid bruits, no JVD, trach is midline. Heart:  Normal S1/S2 negative S3 or S4. Regular, no murmur, gallop or rub.   Respiratory: Clear bilaterally, no wheezing or rales  Abdomen:   Soft, non-tender, bowel sounds are active.   Extremities:  No edema, normal cap refill, no cyanosis. Neuro: A&Ox3, speech clear, gait stable. Skin: Skin color is normal. No rashes or lesions. No diaphoresis.   Vascular: 2+ pulses symmetric in all extremities      Data Review:       Cardiographics:    EKG: None done today    Cardiology Labs:    Results for orders placed or performed during the hospital encounter of 11/06/16   EKG, 12 LEAD, INITIAL   Result Value Ref Range    Ventricular Rate 84 BPM    Atrial Rate 84 BPM    P-R Interval 156 ms    QRS Duration 82 ms    Q-T Interval 400 ms    QTC Calculation (Bezet) 472 ms    Calculated P Axis 36 degrees    Calculated R Axis 1 degrees    Calculated T Axis 135 degrees    Diagnosis       Normal sinus rhythm  Possible Left atrial enlargement  Left ventricular hypertrophy with repolarization abnormality  Cannot rule out Septal infarct , age undetermined    Confirmed by Ronaldo Alarcon ([id number]) on 11/7/2016 1:54:12 PM         No results found for: CHOL, CHOLX, CHLST, CHOLV, [id number], HDL, LDL, DLDL, LDLC, DLDLP, TGL, Meir Ravalli, [id number], TRIGP, CHHD, [address]  [facility]    Lab Results   Component Value Date/Time    Sodium 138 11/06/2016 06:08 PM    Potassium 3.3 11/06/2016 06:08 PM    Chloride 105 11/06/2016 06:08 PM    CO2 24 11/06/2016 06:08 PM    Anion gap 9 11/06/2016 06:08 PM    Glucose 98 11/06/2016 06:08 PM    BUN 17 11/06/2016 06:08 PM    Creatinine 0.85 11/06/2016 06:08 PM    BUN/Creatinine ratio 20 11/06/2016 06:08 PM    GFR est AA >60 11/06/2016 06:08 PM    GFR est non-AA >60 11/06/2016 06:08 PM    Calcium 8.2 11/06/2016 06:08 PM    Bilirubin, total 0.3 11/06/2016 06:08 PM    AST (SGOT) 18 11/06/2016 06:08 PM    Alk. phosphatase 67 11/06/2016 06:08 PM    Protein, total 7.1 11/06/2016 06:08 PM    Albumin 3.4 11/06/2016 06:08 PM    Globulin 3.7 11/06/2016 06:08 PM    A-G Ratio 0.9 11/06/2016 06:08 PM    ALT (SGPT) 46 11/06/2016 06:08 PM          Assessment:       ICD-10-CM ICD-9-CM    1. Essential hypertension I10 401.9 valsartan (DIOVAN) 40 mg tablet   2. Chest pain, unspecified type R07.9 786.50    3. SOB (shortness of breath) R06.02 786.05          Discussion: Patient presents at this time stable from a cardiac perspective. Pleased with present status. Plan:   Discussed with Dr. Estrellita Amaro     1. STOP Amlodipine and Losartan    -- START Diovan 160mg    2. Encouraged to exercise to tolerance, lose weight and follow low fat, low cholesterol, low sodium predominantly Plant-based (consider Mediterranean) diet. 3.Follow up: 1 month follow up     I have discussed the diagnosis with the patient and the intenCall with questions or concerns. Will follow up any test results by phone and/or f/u here in office if needed. .  ded plan as seen in the above orders. The patient has received an after-visit summary and questions were answered concerning future plans. I have discussed any concerning medication side effects and warnings with the patient as well.     Seema Hendrix PA-C  4/19/2017

## 2017-06-16 ENCOUNTER — HOSPITAL ENCOUNTER (EMERGENCY)
Age: 37
Discharge: HOME | End: 2017-06-16
Attending: EMERGENCY MEDICINE | Admitting: EMERGENCY MEDICINE
Payer: MEDICARE

## 2017-06-16 VITALS
RESPIRATION RATE: 16 BRPM | WEIGHT: 196 LBS | HEART RATE: 69 BPM | HEIGHT: 62 IN | SYSTOLIC BLOOD PRESSURE: 134 MMHG | OXYGEN SATURATION: 100 % | DIASTOLIC BLOOD PRESSURE: 83 MMHG | BODY MASS INDEX: 36.07 KG/M2 | TEMPERATURE: 98.7 F

## 2017-06-16 DIAGNOSIS — L03.111 CELLULITIS OF RIGHT AXILLA: Primary | ICD-10-CM

## 2017-06-16 PROCEDURE — 74011250637 HC RX REV CODE- 250/637: Performed by: PHYSICIAN ASSISTANT

## 2017-06-16 PROCEDURE — 99283 EMERGENCY DEPT VISIT LOW MDM: CPT

## 2017-06-16 RX ORDER — HYDROCODONE BITARTRATE AND ACETAMINOPHEN 5; 325 MG/1; MG/1
1 TABLET ORAL
Qty: 12 TAB | Refills: 0 | Status: SHIPPED | OUTPATIENT
Start: 2017-06-16 | End: 2017-08-26

## 2017-06-16 RX ORDER — NAPROXEN 500 MG/1
500 TABLET ORAL 2 TIMES DAILY WITH MEALS
Qty: 20 TAB | Refills: 0 | Status: SHIPPED | OUTPATIENT
Start: 2017-06-16 | End: 2017-06-26

## 2017-06-16 RX ORDER — CLINDAMYCIN HYDROCHLORIDE 150 MG/1
300 CAPSULE ORAL 3 TIMES DAILY
Qty: 42 CAP | Refills: 0 | Status: SHIPPED | OUTPATIENT
Start: 2017-06-16 | End: 2017-06-23

## 2017-06-16 RX ORDER — HYDROCODONE BITARTRATE AND ACETAMINOPHEN 5; 325 MG/1; MG/1
1 TABLET ORAL
Status: COMPLETED | OUTPATIENT
Start: 2017-06-16 | End: 2017-06-16

## 2017-06-16 RX ORDER — ONDANSETRON 4 MG/1
4 TABLET, ORALLY DISINTEGRATING ORAL
Qty: 10 TAB | Refills: 0 | Status: SHIPPED | OUTPATIENT
Start: 2017-06-16 | End: 2017-10-06 | Stop reason: ALTCHOICE

## 2017-06-16 RX ORDER — CLINDAMYCIN HYDROCHLORIDE 150 MG/1
300 CAPSULE ORAL
Status: COMPLETED | OUTPATIENT
Start: 2017-06-16 | End: 2017-06-16

## 2017-06-16 RX ORDER — NAPROXEN 500 MG/1
500 TABLET ORAL 2 TIMES DAILY WITH MEALS
Qty: 20 TAB | Refills: 0 | Status: SHIPPED | OUTPATIENT
Start: 2017-06-16 | End: 2017-06-16

## 2017-06-16 RX ORDER — HYDROCODONE BITARTRATE AND ACETAMINOPHEN 5; 325 MG/1; MG/1
1 TABLET ORAL
Qty: 12 TAB | Refills: 0 | Status: SHIPPED | OUTPATIENT
Start: 2017-06-16 | End: 2017-06-16

## 2017-06-16 RX ORDER — ONDANSETRON 4 MG/1
4 TABLET, ORALLY DISINTEGRATING ORAL
Qty: 10 TAB | Refills: 0 | Status: SHIPPED | OUTPATIENT
Start: 2017-06-16 | End: 2017-06-16

## 2017-06-16 RX ORDER — CLINDAMYCIN HYDROCHLORIDE 150 MG/1
300 CAPSULE ORAL 3 TIMES DAILY
Qty: 42 CAP | Refills: 0 | Status: SHIPPED | OUTPATIENT
Start: 2017-06-16 | End: 2017-06-16

## 2017-06-16 RX ORDER — FLUTICASONE PROPIONATE AND SALMETEROL 100; 50 UG/1; UG/1
1 POWDER RESPIRATORY (INHALATION) EVERY 12 HOURS
COMMUNITY
End: 2022-09-26

## 2017-06-16 RX ADMIN — CLINDAMYCIN HYDROCHLORIDE 300 MG: 150 CAPSULE ORAL at 13:27

## 2017-06-16 RX ADMIN — HYDROCODONE BITARTRATE AND ACETAMINOPHEN 1 TABLET: 5; 325 TABLET ORAL at 13:27

## 2017-06-16 NOTE — ED PROVIDER NOTES
HPI   To ED with complaints of right axilla swelling and ternderness for one week. No fevers, chills, drainage. Has had multiple axilla abscessed requiring I/D, some in OR with Dr. Renea Albarran. No drainage. Has tried warm compresses with little relief. Past Medical History:   Diagnosis Date    Abscess 7/11/2016    CAD (coronary artery disease)     enlarged heart    Delivery normal     Hypertension     Psychiatric disorder     depresssion    Tubal ligation status 12/10/15       Past Surgical History:   Procedure Laterality Date    HX GYN      tubal ligation    HX ORTHOPAEDIC      tubal pregnancy removal    HX OTHER SURGICAL      abscesses         History reviewed. No pertinent family history. Social History     Social History    Marital status: UNKNOWN     Spouse name: N/A    Number of children: N/A    Years of education: N/A     Occupational History    Not on file. Social History Main Topics    Smoking status: Current Some Day Smoker     Packs/day: 0.25    Smokeless tobacco: Not on file    Alcohol use Yes      Comment: socially    Drug use: No    Sexual activity: No     Other Topics Concern    Not on file     Social History Narrative         ALLERGIES: Other food; Iodine; and Shellfish containing products    Review of Systems   Constitutional: Negative for chills and fever. HENT: Negative for congestion, rhinorrhea and sore throat. Eyes: Negative for pain and discharge. Respiratory: Negative for cough and shortness of breath. Cardiovascular: Negative for chest pain. Gastrointestinal: Negative for abdominal pain, nausea and vomiting. Musculoskeletal: Negative for back pain and neck pain. Skin: Negative for rash and wound. No other skin abnormalities. Neurological: Negative for seizures, syncope and headaches. All other systems reviewed and are negative.       Vitals:    06/16/17 1226   BP: 134/83   Pulse: 69   Resp: 16   Temp: 98.7 °F (37.1 °C)   SpO2: 100% Weight: 88.9 kg (196 lb)   Height: 5' 2\" (1.575 m)            Physical Exam   Constitutional: She is oriented to person, place, and time. She appears well-developed and well-nourished. HENT:   Head: Normocephalic and atraumatic. Eyes: Conjunctivae and EOM are normal. Pupils are equal, round, and reactive to light. Neck: Normal range of motion. Neck supple. Cardiovascular: Normal rate, regular rhythm and normal heart sounds. Soft systolic murmur   Pulmonary/Chest: Effort normal and breath sounds normal. She has no wheezes. Abdominal: Soft. Bowel sounds are normal. There is no tenderness. There is no rebound. Musculoskeletal: Normal range of motion. Neurological: She is alert and oriented to person, place, and time. She has normal reflexes. Skin: Skin is warm and dry. Psychiatric: She has a normal mood and affect. Her behavior is normal.   Nursing note and vitals reviewed. MDM  Number of Diagnoses or Management Options  Cellulitis of right axilla:   Diagnosis management comments: DDX: cellulitis, abscess, hydradenitis     ED Course       Procedures        LABORATORY TESTS:  No results found for this or any previous visit (from the past 12 hour(s)). IMAGING RESULTS:  No orders to display       MEDICATIONS GIVEN:  Medications - No data to display    IMPRESSION:  1. Cellulitis of right axilla        PLAN:  1. Current Discharge Medication List      START taking these medications    Details   ondansetron (ZOFRAN ODT) 4 mg disintegrating tablet Take 1 Tab by mouth every eight (8) hours as needed for Nausea. Qty: 10 Tab, Refills: 0      HYDROcodone-acetaminophen (NORCO) 5-325 mg per tablet Take 1 Tab by mouth every four (4) hours as needed for Pain. Max Daily Amount: 6 Tabs. Qty: 12 Tab, Refills: 0      naproxen (NAPROSYN) 500 mg tablet Take 1 Tab by mouth two (2) times daily (with meals) for 10 days.   Qty: 20 Tab, Refills: 0      clindamycin (CLEOCIN) 150 mg capsule Take 2 Caps by mouth three (3) times daily for 7 days. Qty: 42 Cap, Refills: 0         CONTINUE these medications which have NOT CHANGED    Details   fluticasone-salmeterol (ADVAIR DISKUS) 100-50 mcg/dose diskus inhaler Take 1 Puff by inhalation every twelve (12) hours. valsartan (DIOVAN) 40 mg tablet Take 4 Tabs by mouth daily. Indications: hypertension  Qty: 30 Tab, Refills: 3    Associated Diagnoses: Essential hypertension      labetalol (NORMODYNE) 100 mg tablet Take 1 Tab by mouth two (2) times a day. Qty: 60 Tab, Refills: 1    Associated Diagnoses: Essential hypertension; Chest pain, unspecified type; Left ventricular hypertrophy      cloNIDine HCl (CATAPRES) 0.2 mg tablet Take 1 Tab by mouth two (2) times a day. Indications: Hypertension  Qty: 60 Tab, Refills: 6      aspirin delayed-release 81 mg tablet Take  by mouth daily. PROAIR HFA 90 mcg/actuation inhaler inhale 2 puffs by mouth every 4 hours  Refills: 0      lidocaine (XYLOCAINE) 5 % ointment Apply  to affected area two (2) times a day. Qty: 1 Tube, Refills: 0    Associated Diagnoses: Chest wall pain      omega-3 fatty acids-vitamin e (FISH OIL) 1,000 mg cap Take 1 Cap by mouth daily. triamcinolone acetonide (KENALOG) 0.1 % ointment apply to affected area twice a day if needed for eczema  Refills: 0      Ferrous Fumarate 325 mg (106 mg iron) Tab Take 325 mg by mouth. 2.   Follow-up Information     Follow up With Details Comments Chuyita Lenz MD  Call Dr. Rosa Amaro to arrange follow up this coming week.   [address] [address]  [phone]      [facility] EMERGENCY DEPT  If symptoms worsen Burton Seay        Return to ED if worse

## 2017-06-16 NOTE — DISCHARGE INSTRUCTIONS

## 2017-06-16 NOTE — ED NOTES
Patient has been instructed that they have been given Norco* which contains opioids, benzodiazepines, or other sedating drugs. Patient is aware that they  will need to refrain from driving or operating heavy machinery after taking this medication. Patient also instructed that they need to avoid drinking alcohol and using other products containing opioids, benzodiazepines, or other sedating drugs. Patient verbalized understanding.

## 2017-06-16 NOTE — ED NOTES
Patient given copy of discharge instructions and 4 script(s). Patient given a current medication reconciliation form and verbalized understanding of their medications and importance of discussing medications at follow-up. Patient stable at discharge. Ambuatory out of ED with self.  Declines wheelchair

## 2017-06-16 NOTE — ED NOTES
Pt reports right axillary abscess x 1 week, minimal fluctuance      Emergency Department Nursing Plan of Care       The Nursing Plan of Care is developed from the Nursing assessment and Emergency Department Attending provider initial evaluation. The plan of care may be reviewed in the ED Provider note.     The Plan of Care was developed with the following considerations:   Patient / Family readiness to learn indicated by:verbalized understanding  Persons(s) to be included in education: patient  Barriers to Learning/Limitations:No    Signed     Eliceo Enriquez RN    6/16/2017   12:53 PM

## 2017-06-21 ENCOUNTER — OFFICE VISIT (OUTPATIENT)
Dept: CARDIOLOGY CLINIC | Age: 37
End: 2017-06-21

## 2017-06-21 VITALS
DIASTOLIC BLOOD PRESSURE: 81 MMHG | HEART RATE: 68 BPM | WEIGHT: 182 LBS | OXYGEN SATURATION: 97 % | SYSTOLIC BLOOD PRESSURE: 128 MMHG | BODY MASS INDEX: 33.49 KG/M2 | HEIGHT: 62 IN

## 2017-06-21 DIAGNOSIS — I10 ESSENTIAL HYPERTENSION: ICD-10-CM

## 2017-06-21 DIAGNOSIS — I11.9 LVH (LEFT VENTRICULAR HYPERTROPHY) DUE TO HYPERTENSIVE DISEASE, WITHOUT HEART FAILURE: Primary | ICD-10-CM

## 2017-06-21 RX ORDER — IBUPROFEN 600 MG/1
TABLET ORAL
Refills: 0 | COMMUNITY
Start: 2017-03-27 | End: 2017-08-26

## 2017-06-21 RX ORDER — VALSARTAN 160 MG/1
160 TABLET ORAL DAILY
Qty: 30 TAB | Refills: 11 | Status: SHIPPED | OUTPATIENT
Start: 2017-06-21 | End: 2018-07-11 | Stop reason: SDUPTHER

## 2017-06-21 RX ORDER — NORETHINDRONE 0.35 MG/1
TABLET ORAL
Refills: 0 | COMMUNITY
Start: 2017-03-31 | End: 2017-10-06 | Stop reason: ALTCHOICE

## 2017-06-21 RX ORDER — AMLODIPINE BESYLATE 10 MG/1
TABLET ORAL
Refills: 0 | COMMUNITY
Start: 2017-04-17 | End: 2017-06-21 | Stop reason: ALTCHOICE

## 2017-06-21 RX ORDER — VALACYCLOVIR HYDROCHLORIDE 500 MG/1
TABLET, FILM COATED ORAL
Refills: 1 | COMMUNITY
Start: 2017-05-01

## 2017-06-21 RX ORDER — OXYCODONE AND ACETAMINOPHEN 5; 325 MG/1; MG/1
TABLET ORAL
Refills: 0 | COMMUNITY
Start: 2017-03-27 | End: 2017-08-26

## 2017-06-21 RX ORDER — LOSARTAN POTASSIUM 100 MG/1
TABLET ORAL
Refills: 0 | COMMUNITY
Start: 2017-04-17 | End: 2017-06-21 | Stop reason: ALTCHOICE

## 2017-06-21 RX ORDER — METRONIDAZOLE 500 MG/1
TABLET ORAL
Refills: 0 | COMMUNITY
Start: 2017-03-27 | End: 2017-06-21 | Stop reason: ALTCHOICE

## 2017-06-21 NOTE — PATIENT INSTRUCTIONS
-- STOP Amlodipine and Losartan  -- START Diovan 160 mg   -- CONTINUE Clonidine and Labetalol  -- CALL ME if your blood pressure goes too high  -- A good range is 120/80  -- Call if upper number is higher than 140  -- Call if lower number is higher than 95    -- Make a follow up with us in 3 months     Heart-Healthy Diet: Care Instructions  Your Care Instructions    A heart-healthy diet has lots of vegetables, fruits, nuts, beans, and whole grains, and is low in salt. It limits foods that are high in saturated fat, such as meats, cheeses, and fried foods. It may be hard to change your diet, but even small changes can lower your risk of heart attack and heart disease. Follow-up care is a key part of your treatment and safety. Be sure to make and go to all appointments, and call your doctor if you are having problems. It's also a good idea to know your test results and keep a list of the medicines you take. How can you care for yourself at home? Watch your portions  · Learn what a serving is. A \"serving\" and a \"portion\" are not always the same thing. Make sure that you are not eating larger portions than are recommended. For example, a serving of pasta is ½ cup. A serving size of meat is 2 to 3 ounces. A 3-ounce serving is about the size of a deck of cards. Measure serving sizes until you are good at [place]abb" them. Keep in mind that restaurants often serve portions that are 2 or 3 times the size of one serving. · To keep your energy level up and keep you from feeling hungry, eat often but in smaller portions. · Eat only the number of calories you need to stay at a healthy weight. If you need to lose weight, eat fewer calories than your body burns (through exercise and other physical activity). Eat more fruits and vegetables  · Eat a variety of fruit and vegetables every day. Dark green, deep orange, red, or yellow fruits and vegetables are especially good for you.  Examples include spinach, carrots, peaches, and berries. · Keep carrots, celery, and other veggies handy for snacks. Buy fruit that is in season and store it where you can see it so that you will be tempted to eat it. · Cook dishes that have a lot of veggies in them, such as stir-fries and soups. Limit saturated and trans fat  · Read food labels, and try to avoid saturated and trans fats. They increase your risk of heart disease. Trans fat is found in many processed foods such as cookies and crackers. · Use olive or canola oil when you cook. Try cholesterol-lowering spreads, such as Benecol or Take Control. · Bake, broil, grill, or steam foods instead of frying them. · Choose lean meats instead of high-fat meats such as hot dogs and sausages. Cut off all visible fat when you prepare meat. · Eat fish, skinless poultry, and meat alternatives such as soy products instead of high-fat meats. Soy products, such as tofu, may be especially good for your heart. · Choose low-fat or fat-free milk and dairy products. Eat fish  · Eat at least two servings of fish a week. Certain fish, such as salmon and tuna, contain omega-3 fatty acids, which may help reduce your risk of heart attack. Eat foods high in fiber  · Eat a variety of grain products every day. Include whole-grain foods that have lots of fiber and nutrients. Examples of whole-grain foods include oats, whole wheat bread, and brown rice. · Buy whole-grain breads and cereals, instead of white bread or pastries. Limit salt and sodium  · Limit how much salt and sodium you eat to help lower your blood pressure. · Taste food before you salt it. Add only a little salt when you think you need it. With time, your taste buds will adjust to less salt. · Eat fewer snack items, fast foods, and other high-salt, processed foods. Check food labels for the amount of sodium in packaged foods. · Choose low-sodium versions of canned goods (such as soups, vegetables, and beans).   Limit sugar  · Limit drinks and foods with added sugar. These include candy, desserts, and soda pop. Limit alcohol  · Limit alcohol to no more than 2 drinks a day for men and 1 drink a day for women. Too much alcohol can cause health problems. When should you call for help? Watch closely for changes in your health, and be sure to contact your doctor if:  · You would like help planning heart-healthy meals. Where can you learn more? Go to http://rachel-velma.info/. Enter V137 in the search box to learn more about \"Heart-Healthy Diet: Care Instructions. \"  Current as of: April 3, 2017  Content Version: 11.3  © 1020-8809 [employer]. Care instructions adapted under license by [employer] (which disclaims liability or warranty for this information). If you have questions about a medical condition or this instruction, always ask your healthcare professional. [address] any warranty or liability for your use of this information.

## 2017-06-21 NOTE — MR AVS SNAPSHOT
Visit Information Date & Time Provider Department Dept. Phone Encounter #  
 6/21/2017  1:30 PM Nallely Lan MD [facility] [phone] [id number] Upcoming Health Maintenance Date Due Pneumococcal 19-64 Medium Risk (1 of 1 - PPSV23) 2/26/2000 PAP AKA CERVICAL CYTOLOGY 2/26/2002 INFLUENZA AGE 9 TO ADULT 8/1/2017 DTaP/Tdap/Td series (2 - Td) 12/10/2025 Allergies as of 6/21/2017  Review Complete On: 6/21/2017 By: Kenny Charles PA-C Severity Noted Reaction Type Reactions Other Food High 06/16/2017    Anaphylaxis Old Bey seasoning Iodine  07/13/2010    Itching Shellfish Containing Products  07/13/2010    Itching Current Immunizations  Never Reviewed Name Date  
 TD Vaccine 7/27/2011  1:09 AM  
 Tdap 12/10/2015  2:53 PM  
  
 Not reviewed this visit You Were Diagnosed With   
  
 Codes Comments Essential hypertension     ICD-10-CM: I10 
ICD-9-CM: 401.9 Vitals BP Pulse Height(growth percentile) Weight(growth percentile) LMP SpO2  
 128/81 68 5' 2\" (1.575 m) 182 lb (82.6 kg) 06/01/2017 (Approximate) 97% BMI OB Status Smoking Status 33.29 kg/m2 Having regular periods Current Some Day Smoker Vitals History BMI and BSA Data Body Mass Index Body Surface Area  
 33.29 kg/m 2 1.9 m 2 [facility] [address] [phone] Your Updated Medication List  
  
   
This list is accurate as of: 6/21/17  2:15 PM.  Always use your most recent med list.  
  
  
  
  
 ADVAIR DISKUS 100-50 mcg/dose diskus inhaler Generic drug:  fluticasone-salmeterol Take 1 Puff by inhalation every twelve (12) hours. aspirin delayed-release 81 mg tablet Take  by mouth daily. clindamycin 150 mg capsule Commonly known as:  CLEOCIN  
 Take 2 Caps by mouth three (3) times daily for 7 days. cloNIDine HCl 0.2 mg tablet Commonly known as:  CATAPRES Take 1 Tab by mouth two (2) times a day. Indications: Hypertension PATRICIO 0.35 mg Tab Generic drug:  norethindrone Ferrous Fumarate 325 mg (106 mg iron) Tab Take 325 mg by mouth. FISH OIL 1,000 mg Cap Generic drug:  omega-3 fatty acids-vitamin e Take 1 Cap by mouth daily. HYDROcodone-acetaminophen 5-325 mg per tablet Commonly known as:  [address] Take 1 Tab by mouth every four (4) hours as needed for Pain. Max Daily Amount: 6 Tabs. ibuprofen 600 mg tablet Commonly known as:  MOTRIN  
take 1 tablet by mouth every 6 to 8 hours if needed for pain  
  
 labetalol 100 mg tablet Commonly known as:  Bill Dowse Take 1 Tab by mouth two (2) times a day. lidocaine 5 % ointment Commonly known as:  XYLOCAINE Apply  to affected area two (2) times a day. naproxen 500 mg tablet Commonly known as:  NAPROSYN Take 1 Tab by mouth two (2) times daily (with meals) for 10 days. ondansetron 4 mg disintegrating tablet Commonly known as:  ZOFRAN ODT Take 1 Tab by mouth every eight (8) hours as needed for Nausea. oxyCODONE-acetaminophen 5-325 mg per tablet Commonly known as:  PERCOCET  
take 1 to 2 tablets by mouth every 4 to 6 hours if needed for pain PROAIR HFA 90 mcg/actuation inhaler Generic drug:  albuterol  
inhale 2 puffs by mouth every 4 hours  
  
 triamcinolone acetonide 0.1 % ointment Commonly known as:  KENALOG  
apply to affected area twice a day if needed for eczema  
  
 valACYclovir 500 mg tablet Commonly known as:  VALTREX  
  
 valsartan 160 mg tablet Commonly known as:  DIOVAN Take 1 Tab by mouth daily. Indications: hypertension Prescriptions Sent to Pharmacy Refills  
 valsartan (DIOVAN) 160 mg tablet 11 Sig: Take 1 Tab by mouth daily. Indications: hypertension  Class: Normal  
 Pharmacy: [id number] Ph #: [phone] Route: Oral  
  
Patient Instructions -- STOP Amlodipine and Losartan 
-- START Diovan 160 mg  
-- CONTINUE Clonidine and Labetalol -- CALL ME if your blood pressure goes too high 
-- A good range is 120/80 
-- Call if upper number is higher than 140 
-- Call if lower number is higher than 95 
 
-- Make a follow up with us in 3 months Heart-Healthy Diet: Care Instructions Your Care Instructions A heart-healthy diet has lots of vegetables, fruits, nuts, beans, and whole grains, and is low in salt. It limits foods that are high in saturated fat, such as meats, cheeses, and fried foods. It may be hard to change your diet, but even small changes can lower your risk of heart attack and heart disease. Follow-up care is a key part of your treatment and safety. Be sure to make and go to all appointments, and call your doctor if you are having problems. It's also a good idea to know your test results and keep a list of the medicines you take. How can you care for yourself at home? Watch your portions · Learn what a serving is. A \"serving\" and a \"portion\" are not always the same thing. Make sure that you are not eating larger portions than are recommended. For example, a serving of pasta is ½ cup. A serving size of meat is 2 to 3 ounces. A 3-ounce serving is about the size of a deck of cards. Measure serving sizes until you are good at Moore" them. Keep in mind that restaurants often serve portions that are 2 or 3 times the size of one serving. · To keep your energy level up and keep you from feeling hungry, eat often but in smaller portions. · Eat only the number of calories you need to stay at a healthy weight. If you need to lose weight, eat fewer calories than your body burns (through exercise and other physical activity). Eat more fruits and vegetables · Eat a variety of fruit and vegetables every day. Dark green, deep orange, red, or yellow fruits and vegetables are especially good for you. Examples include spinach, carrots, peaches, and berries. · Keep carrots, celery, and other veggies handy for snacks. Buy fruit that is in season and store it where you can see it so that you will be tempted to eat it. · Cook dishes that have a lot of veggies in them, such as stir-fries and soups. Limit saturated and trans fat · Read food labels, and try to avoid saturated and trans fats. They increase your risk of heart disease. Trans fat is found in many processed foods such as cookies and crackers. · Use olive or canola oil when you cook. Try cholesterol-lowering spreads, such as Benecol or Take Control. · Bake, broil, grill, or steam foods instead of frying them. · Choose lean meats instead of high-fat meats such as hot dogs and sausages. Cut off all visible fat when you prepare meat. · Eat fish, skinless poultry, and meat alternatives such as soy products instead of high-fat meats. Soy products, such as tofu, may be especially good for your heart. · Choose low-fat or fat-free milk and dairy products. Eat fish · Eat at least two servings of fish a week. Certain fish, such as salmon and tuna, contain omega-3 fatty acids, which may help reduce your risk of heart attack. Eat foods high in fiber · Eat a variety of grain products every day. Include whole-grain foods that have lots of fiber and nutrients. Examples of whole-grain foods include oats, whole wheat bread, and brown rice. · Buy whole-grain breads and cereals, instead of white bread or pastries. Limit salt and sodium · Limit how much salt and sodium you eat to help lower your blood pressure. · Taste food before you salt it. Add only a little salt when you think you need it. With time, your taste buds will adjust to less salt. · Eat fewer snack items, fast foods, and other high-salt, processed foods. Check food labels for the amount of sodium in packaged foods. · Choose low-sodium versions of canned goods (such as soups, vegetables, and beans). Limit sugar · Limit drinks and foods with added sugar. These include candy, desserts, and soda pop. Limit alcohol · Limit alcohol to no more than 2 drinks a day for men and 1 drink a day for women. Too much alcohol can cause health problems. When should you call for help? Watch closely for changes in your health, and be sure to contact your doctor if: 
· You would like help planning heart-healthy meals. Where can you learn more? Go to http://rachel-velma.info/. Enter V137 in the search box to learn more about \"Heart-Healthy Diet: Care Instructions. \" Current as of: April 3, 2017 Content Version: 11.3 © 7918-9708 [employer]. Care instructions adapted under license by [employer] (which disclaims liability or warranty for this information). If you have questions about a medical condition or this instruction, always ask your healthcare professional. [address] any warranty or liability for your use of this information. Introducing [facility]! [employer] introduces FlixChip patient portal. Now you can access parts of your medical record, email your doctor's office, and request medication refills online. 1. In your internet browser, go to [email or web address] 2. Click on the First Time User? Click Here link in the Sign In box. You will see the New Member Sign Up page. 3. Enter your FlixChip Access Code exactly as it appears below. You will not need to use this code after youve completed the sign-up process. If you do not sign up before the expiration date, you must request a new code. · FlixChip Access Code: [email or web address] Expires: 9/14/2017  1:11 PM 
 
4.  Enter the last four digits of your Social Security Number (xxxx) and Date of Birth (mm/dd/yyyy) as indicated and click Submit. You will be taken to the next sign-up page. 5. Create a KloudNation ID. This will be your KloudNation login ID and cannot be changed, so think of one that is secure and easy to remember. 6. Create a KloudNation password. You can change your password at any time. 7. Enter your Password Reset Question and Answer. This can be used at a later time if you forget your password. 8. Enter your e-mail address. You will receive e-mail notification when new information is available in [address] 9. Click Sign Up. You can now view and download portions of your medical record. 10. Click the Download Summary menu link to download a portable copy of your medical information. If you have questions, please visit the Frequently Asked Questions section of the [employer] website. Remember, [employer]oud[employer]ation is NOT to be used for urgent needs. For medical emergencies, dial 911. Now available from your iPhone and Android! Please provide this summary of care documentation to your next provider. Your primary care clinician is listed as Erick Reyez. If you have any questions after today's visit, please call [phone].

## 2017-06-21 NOTE — PROGRESS NOTES
[facility]   [address] [facility], [address] [place] [address] Road                                          NEW PATIENT HPI/FOLLOW-UP      NAME:  Rod Persaud   :   1980   MRN:   [id number]   PCP:  Aretha Rick MD           Subjective: The patient is a 40y.o. year old female with HTN who returns for a routine follow-up. Since the last visit, patient reports visit to [facility] overnight observation following heroin overdose. Pt states she went to a party with people she did not know and was giving something to [facility] me pass out\". She had serial troponins during her stay and Cardiology consult with no new findings, per pt. (Records are scanned into Media.)     Pt has lost 14 lbs with simple dietary exchange of fried foods for baked foods. BP has been well controlled but there is some confusion over the regimen. She us also watching her salt intake. Denies change in exercise tolerance, chest pain, edema, medication intolerance, palpitations, shortness of breath, PND/orthopnea wheezing, sputum, syncope, dizziness or light headedness. Doing satisfactorily. Review of Systems  General: Pt denies excessive weight gain or loss. Pt is able to conduct ADL's. Respiratory: Denies shortness of breath, SMITH, wheezing or stridor.   Cardiovascular: Denies precordial pain, palpitations, edema or PND  Gastrointestinal: Denies poor appetite, indigestion, abdominal pain or blood in stool  Peripheral vascular: Denies claudication, leg cramps  Neuropsychiatric: Denies paresthesias,tingling,numbness,anxiety,depression,fatigue  Musculoskeletal: Denies pain,tenderness, soreness,swelling      Past Medical History:   Diagnosis Date    Abscess 2016    CAD (coronary artery disease)     enlarged heart    Delivery normal     Hypertension     Psychiatric disorder     depresssion    Tubal ligation status 12/10/15     Patient Active Problem List    Diagnosis Date Noted    Chest wall pain 2016    Essential hypertension 12/08/2016    Mood disorder ([facility] [address] 11/03/2016    Alcohol abuse 11/03/2016    Cannabis abuse 11/03/2016    Abscess 07/11/2016      Past Surgical History:   Procedure Laterality Date    HX GYN      tubal ligation    HX ORTHOPAEDIC      tubal pregnancy removal    HX OTHER SURGICAL      abscesses     Allergies   Allergen Reactions    Other Food Anaphylaxis     Old Bey seasoning    Iodine Itching    Shellfish Containing Products Itching      History reviewed. No pertinent family history. Social History     Social History    Marital status: UNKNOWN     Spouse name: N/A    Number of children: N/A    Years of education: N/A     Occupational History    Not on file. Social History Main Topics    Smoking status: Current Some Day Smoker     Packs/day: 0.25    Smokeless tobacco: Not on file    Alcohol use Yes      Comment: socially    Drug use: No    Sexual activity: No     Other Topics Concern    Not on file     Social History Narrative      Current Outpatient Prescriptions   Medication Sig    ibuprofen (MOTRIN) 600 mg tablet take 1 tablet by mouth every 6 to 8 hours if needed for pain    oxyCODONE-acetaminophen (PERCOCET) 5-325 mg per tablet take 1 to 2 tablets by mouth every 4 to 6 hours if needed for pain    valACYclovir (VALTREX) 500 mg tablet     valsartan (DIOVAN) 160 mg tablet Take 1 Tab by mouth daily. Indications: hypertension    fluticasone-salmeterol (ADVAIR DISKUS) 100-50 mcg/dose diskus inhaler Take 1 Puff by inhalation every twelve (12) hours.  ondansetron (ZOFRAN ODT) 4 mg disintegrating tablet Take 1 Tab by mouth every eight (8) hours as needed for Nausea.  HYDROcodone-acetaminophen (NORCO) 5-325 mg per tablet Take 1 Tab by mouth every four (4) hours as needed for Pain. Max Daily Amount: 6 Tabs.  naproxen (NAPROSYN) 500 mg tablet Take 1 Tab by mouth two (2) times daily (with meals) for 10 days.     clindamycin (CLEOCIN) 150 mg capsule Take 2 Caps by mouth three (3) times daily for 7 days.  labetalol (NORMODYNE) 100 mg tablet Take 1 Tab by mouth two (2) times a day.  lidocaine (XYLOCAINE) 5 % ointment Apply  to affected area two (2) times a day.  cloNIDine HCl (CATAPRES) 0.2 mg tablet Take 1 Tab by mouth two (2) times a day. Indications: Hypertension    omega-3 fatty acids-vitamin e (FISH OIL) 1,000 mg cap Take 1 Cap by mouth daily.  aspirin delayed-release 81 mg tablet Take  by mouth daily.  PROAIR HFA 90 mcg/actuation inhaler inhale 2 puffs by mouth every 4 hours    triamcinolone acetonide (KENALOG) 0.1 % ointment apply to affected area twice a day if needed for eczema    Ferrous Fumarate 325 mg (106 mg iron) Tab Take 325 mg by mouth.  PATRICIO 0.35 mg tab      No current facility-administered medications for this visit. I have reviewed the MAs notes, vitals, problem list, allergy list, medical history, family medical, social history and medications. Objective:     Physical Exam:     Vitals:    06/21/17 1327   BP: 128/81   Pulse: 68   SpO2: 97%   Weight: 182 lb (82.6 kg)   Height: 5' 2\" (1.575 m)    Body mass index is 33.29 kg/(m^2). General: WDWN adult female, in no acute distress. Pleasant affect. HEENT: No carotid bruits, no JVD, trach is midline. Heart:  Normal S1/S2 negative S3 or S4. Regular, no murmur, gallop or rub.   Respiratory: Clear bilaterally, no wheezing or rales  Abdomen:   Soft, non-tender, bowel sounds are active.   Extremities:  No edema, normal cap refill, no cyanosis. Neuro: A&Ox3, speech clear, gait stable. Skin: Skin color is normal. No rashes or lesions. No diaphoresis.   Vascular: 2+ pulses symmetric in all extremities      Data Review:       Cardiographics:    EKG: None today    Cardiology Labs:    Results for orders placed or performed during the hospital encounter of 11/06/16   EKG, 12 LEAD, INITIAL   Result Value Ref Range    Ventricular Rate 84 BPM    Atrial Rate 84 BPM    P-R Interval 156 ms    QRS Duration 82 ms    Q-T Interval 400 ms    QTC Calculation (Bezet) 472 ms    Calculated P Axis 36 degrees    Calculated R Axis 1 degrees    Calculated T Axis 135 degrees    Diagnosis       Normal sinus rhythm  Possible Left atrial enlargement  Left ventricular hypertrophy with repolarization abnormality  Cannot rule out Septal infarct , age undetermined    Confirmed by Jimy Yepez ([id number]) on 11/7/2016 1:54:12 PM         No results found for: CHOL, CHOLX, CHLST, CHOLV, 864833, HDL, LDL, LDLC, DLDLP, Lacy Ranks, [facility], [facility]    Lab Results   Component Value Date/Time    Sodium 138 11/06/2016 06:08 PM    Potassium 3.3 11/06/2016 06:08 PM    Chloride 105 11/06/2016 06:08 PM    CO2 24 11/06/2016 06:08 PM    Anion gap 9 11/06/2016 06:08 PM    Glucose 98 11/06/2016 06:08 PM    BUN 17 11/06/2016 06:08 PM    Creatinine 0.85 11/06/2016 06:08 PM    BUN/Creatinine ratio 20 11/06/2016 06:08 PM    GFR est AA >60 11/06/2016 06:08 PM    GFR est non-AA >60 11/06/2016 06:08 PM    Calcium 8.2 11/06/2016 06:08 PM    Bilirubin, total 0.3 11/06/2016 06:08 PM    AST (SGOT) 18 11/06/2016 06:08 PM    Alk. phosphatase 67 11/06/2016 06:08 PM    Protein, total 7.1 11/06/2016 06:08 PM    Albumin 3.4 11/06/2016 06:08 PM    Globulin 3.7 11/06/2016 06:08 PM    A-G Ratio 0.9 11/06/2016 06:08 PM    ALT (SGPT) 46 11/06/2016 06:08 PM          Assessment:       ICD-10-CM ICD-9-CM    1. LVH (left ventricular hypertrophy) due to hypertensive disease, without heart failure I11.9 402.90    2. Essential hypertension I10 401.9 valsartan (DIOVAN) 160 mg tablet         Discussion: Patient presents at this time stable from a cardiac perspective. Pleased with present status. Educated on proliferation of Fentanyl laced heroin on the streets. Pt cautioned on illicit drug use. Plan:   Discussed with Dr. Ansari     1.  Reiterated plan for change in antihypertensive regimen from last visit, which did not get implemented   -- STOP Amlodipine and Losartan   -- START Diovan 160 mg every day   -- CONTINUE clonidine and Labetalol   -- Pt to call with home BP meds    2. Encouraged to exercise to tolerance, lose weight, stop smoking and follow low fat, low cholesterol, low sodium predominantly Plant-based (consider Mediterranean) diet. -- Call with questions or concerns. -- Will follow up any test results by phone and/or f/u here in office if needed. Aparna Rodriguez 3.Follow up: 3 months    I have discussed the diagnosis with the patient and the intended plan as seen in the above orders. The patient has received an after-visit summary and questions were answered concerning future plans. I have discussed any concerning medication side effects and warnings with the patient as well.     Pascual Brody, DAVID  6/21/2017

## 2017-08-26 ENCOUNTER — HOSPITAL ENCOUNTER (EMERGENCY)
Age: 37
Discharge: HOME | End: 2017-08-26
Attending: EMERGENCY MEDICINE
Payer: MEDICARE

## 2017-08-26 VITALS
SYSTOLIC BLOOD PRESSURE: 148 MMHG | DIASTOLIC BLOOD PRESSURE: 84 MMHG | RESPIRATION RATE: 16 BRPM | BODY MASS INDEX: 34.23 KG/M2 | HEIGHT: 62 IN | OXYGEN SATURATION: 100 % | HEART RATE: 74 BPM | WEIGHT: 186 LBS | TEMPERATURE: 98.4 F

## 2017-08-26 DIAGNOSIS — L02.91 ABSCESS: ICD-10-CM

## 2017-08-26 DIAGNOSIS — L73.2 HYDRADENITIS: Primary | ICD-10-CM

## 2017-08-26 PROCEDURE — 74011250637 HC RX REV CODE- 250/637: Performed by: PHYSICIAN ASSISTANT

## 2017-08-26 PROCEDURE — 99283 EMERGENCY DEPT VISIT LOW MDM: CPT

## 2017-08-26 RX ORDER — CLINDAMYCIN HYDROCHLORIDE 150 MG/1
300 CAPSULE ORAL 3 TIMES DAILY
Qty: 42 CAP | Refills: 0 | Status: SHIPPED | OUTPATIENT
Start: 2017-08-26 | End: 2017-10-06 | Stop reason: ALTCHOICE

## 2017-08-26 RX ORDER — IBUPROFEN 600 MG/1
600 TABLET ORAL
Qty: 20 TAB | Refills: 0 | Status: SHIPPED | OUTPATIENT
Start: 2017-08-26 | End: 2017-09-02

## 2017-08-26 RX ORDER — HYDROCODONE BITARTRATE AND ACETAMINOPHEN 5; 325 MG/1; MG/1
2 TABLET ORAL
Status: COMPLETED | OUTPATIENT
Start: 2017-08-26 | End: 2017-08-26

## 2017-08-26 RX ORDER — ONDANSETRON 4 MG/1
4 TABLET, ORALLY DISINTEGRATING ORAL
Status: COMPLETED | OUTPATIENT
Start: 2017-08-26 | End: 2017-08-26

## 2017-08-26 RX ORDER — CLINDAMYCIN HYDROCHLORIDE 150 MG/1
300 CAPSULE ORAL
Status: COMPLETED | OUTPATIENT
Start: 2017-08-26 | End: 2017-08-26

## 2017-08-26 RX ORDER — HYDROCODONE BITARTRATE AND ACETAMINOPHEN 5; 325 MG/1; MG/1
1 TABLET ORAL
Qty: 15 TAB | Refills: 0 | Status: SHIPPED | OUTPATIENT
Start: 2017-08-26 | End: 2017-12-29 | Stop reason: ALTCHOICE

## 2017-08-26 RX ADMIN — CLINDAMYCIN HYDROCHLORIDE 300 MG: 150 CAPSULE ORAL at 12:48

## 2017-08-26 RX ADMIN — ONDANSETRON 4 MG: 4 TABLET, ORALLY DISINTEGRATING ORAL at 12:49

## 2017-08-26 RX ADMIN — HYDROCODONE BITARTRATE AND ACETAMINOPHEN 2 TABLET: 5; 325 TABLET ORAL at 12:48

## 2017-08-26 NOTE — ED PROVIDER NOTES
Patient is a 40 y.o. female presenting with abscess. The history is provided by the patient. Abscess    This is a recurrent problem. Episode onset: Swelling and redness for 2 weeks. Started draining yesterday. no fevers. The problem is associated with nothing. There has been no fever. The pain is severe. The pain has been constant since onset. Has had multiple axilla abscesses in past, has had multiple I/D, some in OR. Responded well 2 -3 months ago to clindamycin with last flare. Strongly hoping to avoid I/D if possible. Past Medical History:   Diagnosis Date    Abscess 7/11/2016    CAD (coronary artery disease)     enlarged heart    Delivery normal     Hypertension     Psychiatric disorder     depresssion    Tubal ligation status 12/10/15       Past Surgical History:   Procedure Laterality Date    HX GYN      tubal ligation    HX ORTHOPAEDIC      tubal pregnancy removal    HX OTHER SURGICAL      abscesses         History reviewed. No pertinent family history. Social History     Social History    Marital status: UNKNOWN     Spouse name: N/A    Number of children: N/A    Years of education: N/A     Occupational History    Not on file. Social History Main Topics    Smoking status: Current Some Day Smoker     Packs/day: 0.25    Smokeless tobacco: Not on file    Alcohol use Yes      Comment: socially    Drug use: No    Sexual activity: No     Other Topics Concern    Not on file     Social History Narrative         ALLERGIES: Other food; Iodine; and Shellfish containing products    Review of Systems   Constitutional: Negative for chills and fever. HENT: Negative for congestion, rhinorrhea and sore throat. Eyes: Negative for pain and discharge. Respiratory: Negative for cough and shortness of breath. Cardiovascular: Negative for chest pain. Gastrointestinal: Negative for abdominal pain, nausea and vomiting. Genitourinary: Negative for dysuria, frequency and urgency. Musculoskeletal: Negative for back pain and neck pain. Skin: Positive for wound. Negative for rash. Neurological: Negative for seizures, syncope and headaches. Psychiatric/Behavioral: Negative for confusion. The patient is not nervous/anxious. All other systems reviewed and are negative. Vitals:    08/26/17 1129   BP: 148/84   Pulse: 74   Resp: 16   Temp: 98.4 °F (36.9 °C)   SpO2: 100%   Weight: 84.4 kg (186 lb)   Height: 5' 2\" (1.575 m)            Physical Exam   Constitutional: She appears well-nourished. HENT:   Head: Normocephalic. Mouth/Throat: Oropharynx is clear and moist.   Neck: Normal range of motion. Neck supple. Cardiovascular: Normal rate. No murmur heard. Pulmonary/Chest: Effort normal. She has no wheezes. Neurological: She is alert. Skin: Skin is warm and dry. R axilla:     Large amount of purulent drainage expressed at widely patent round (2-3 mm) opening. Area is tender, with some erythema. One more medial area with some fluctuance, but seems to be draining laterally to open area. approx 3 - 4 cm surrounding erythema about axilla. Psychiatric: She has a normal mood and affect. Nursing note and vitals reviewed. MDM  Number of Diagnoses or Management Options  Abscess:   Hydradenitis:   Diagnosis management comments: DDX: suppurative hydradenitis, cellulitis, abscess. Long discussion with patient regarding options. She is well versed in treatment options given her multiple experiences with axilla abcesses. Would like to try abx and continued local care, follow up with Dr. Blanche Aponte. She will return if worse in the meantime. ED Course       Procedures           LABORATORY TESTS:  No results found for this or any previous visit (from the past 12 hour(s)).     IMAGING RESULTS:  No orders to display       MEDICATIONS GIVEN:  Medications   clindamycin (CLEOCIN) capsule 300 mg (not administered)   HYDROcodone-acetaminophen (NORCO) 5-325 mg per tablet 2 Tab (not administered)   ondansetron (ZOFRAN ODT) tablet 4 mg (not administered)       IMPRESSION:  1. Hydradenitis    2. Abscess        PLAN:  1. Current Discharge Medication List      START taking these medications    Details   clindamycin (CLEOCIN) 150 mg capsule Take 2 Caps by mouth three (3) times daily. Qty: 42 Cap, Refills: 0         CONTINUE these medications which have CHANGED    Details   ibuprofen (MOTRIN) 600 mg tablet Take 1 Tab by mouth every eight (8) hours as needed for Pain for up to 7 days. Qty: 20 Tab, Refills: 0      HYDROcodone-acetaminophen (NORCO) 5-325 mg per tablet Take 1 Tab by mouth every four (4) hours as needed for Pain. Max Daily Amount: 6 Tabs. Qty: 15 Tab, Refills: 0         STOP taking these medications       oxyCODONE-acetaminophen (PERCOCET) 5-325 mg per tablet Comments:   Reason for Stoppin.   Follow-up Information     Follow up With Details Comments Contact Info    Marisol Cerda MD  Call Monday to arrange follow up for continued care.   [address] [address]  [phone]      [facility] EMERGENCY DEPT  If symptoms worsen -  [facility]  [phone]        Return to ED if worse

## 2017-08-26 NOTE — ED NOTES
Right axilla dressed with 4x4 and medipore tape. Patient was given supplies to take home. Patient has been instructed that they have been given NOrco which contains opioids, benzodiazepines, or other sedating drugs. Patient is aware that they  will need to refrain from driving or operating heavy machinery after taking this medication. Patient also instructed that they need to avoid drinking alcohol and using other products containing opioids, benzodiazepines, or other sedating drugs. Patient verbalized understanding.

## 2017-08-26 NOTE — ED NOTES
Pt arrives in the ED with complaints of abscess to right axilla x 2 weeks. Reports history of same. Abscess began draining while on the way here today. Foul smelling purulent drainage noted. Denies fevers.   Area warm, hard to touch, and redness travelling into chest.

## 2017-08-26 NOTE — ED NOTES
Emergency Department Nursing Plan of Care       The Nursing Plan of Care is developed from the Nursing assessment and Emergency Department Attending provider initial evaluation. The plan of care may be reviewed in the ED Provider note.     The Plan of Care was developed with the following considerations:   Patient / Family readiness to learn indicated by:verbalized understanding  Persons(s) to be included in education: patient  Barriers to Learning/Limitations:No    [facility]    8/26/2017   11:43 AM

## 2017-08-26 NOTE — DISCHARGE INSTRUCTIONS
Hidradenitis Suppurativa: Care Instructions  Your Care Instructions    Hidradenitis suppurativa (say \"iuc-kfpd-nq-NY-tus sup-dylan-uh-TY-vuh\") is a skin condition that causes lumps on the skin that look like pimples or boils. The lumps are usually painful and can break open and drain blood and bad-smelling pus. The condition can come and go for many years. Treatment for this condition may include antibiotics and other medicines. You may need surgery to remove the lumps. Home care includes wearing loose-fitting clothes and washing the area gently. You can help prevent lumps from coming back by staying at a healthy weight and not smoking. Doctors don't know exactly how this condition starts. But they do know that something irritates and inflames the hair follicles, causing them to swell and form lumps. This skin condition can't be spread from person to person (isn't contagious). Follow-up care is a key part of your treatment and safety. Be sure to make and go to all appointments, and call your doctor if you are having problems. It's also a good idea to know your test results and keep a list of the medicines you take. How can you care for yourself at home? Skin care  · Wash the area every day with mild soap. Use your hands rather than a washcloth or sponge when you wash that part of your body. · Leave the affected areas uncovered when you can. If you have lumps that are draining, you can cover them with a bandage or other dressing. Put petroleum jelly (such as Vaseline) on the dressing to help keep it from sticking. · Wear-loose fitting clothes that don't rub against the area. Avoid activities that cause skin to rub together. · If you have pain, try a warm compress. Soak a towel or washcloth in warm water, wring it out, and place it on the affected skin for about 10 minutes. Medicines  · Be safe with medicines. Take your medicines exactly as prescribed.  Call your doctor if you think you are having a problem with your medicine. You will get more details on the specific medicines your doctor prescribes. · If your doctor prescribed antibiotics, take them as directed. Do not stop taking them just because you feel better. You need to take the full course of antibiotics. Lifestyle choices  · If you smoke, think about quitting. Smoking can make the condition worse. If you need help quitting, talk to your doctor about stop-smoking programs and medicines. These can increase your chances of quitting for good. · Stay at a healthy weight, or lose weight, by eating healthy foods and being physically active. Being overweight could make this condition worse. When should you call for help? Call your doctor now or seek immediate medical care if:  · You have symptoms of infection, such as:  ¨ Increased pain, swelling, warmth, or redness. ¨ Red streaks leading from the area. ¨ Pus draining from the area. ¨ A fever. Watch closely for changes in your health, and be sure to contact your doctor if:  · You do not get better as expected. Where can you learn more? Go to http://rachel-velma.info/. Enter Y735 in the search box to learn more about \"Hidradenitis Suppurativa: Care Instructions. \"  Current as of: October 13, 2016  Content Version: 11.3  © 8141-4320 [employer]. Care instructions adapted under license by [employer] (which disclaims liability or warranty for this information). If you have questions about a medical condition or this instruction, always ask your healthcare professional. [place] [address] any warranty or liability for your use of this information.

## 2017-08-26 NOTE — ED NOTES
Patient (s)  given copy of dc instructions and 2 script(s). Patient(s)  verbalized understanding of instructions and script (s). Patient given a current medication reconciliation form and verbalized understanding of their medications. Patient (s) verbalized understanding of the importance of discussing medications with  his or her physician or clinic when they follow up. Patient alert and oriented and in no acute distress. Pt verbalizes pain scale of 8 out of 10. Patient discharged home ambulatory with family/friend coming to get her.

## 2017-09-08 DIAGNOSIS — I10 ESSENTIAL HYPERTENSION: ICD-10-CM

## 2017-09-08 DIAGNOSIS — R07.9 CHEST PAIN, UNSPECIFIED TYPE: ICD-10-CM

## 2017-09-08 DIAGNOSIS — I51.7 LEFT VENTRICULAR HYPERTROPHY: ICD-10-CM

## 2017-09-11 RX ORDER — LABETALOL 100 MG/1
TABLET, FILM COATED ORAL
Qty: 60 TAB | Refills: 1 | Status: SHIPPED | OUTPATIENT
Start: 2017-09-11 | End: 2017-10-06 | Stop reason: SDUPTHER

## 2017-09-14 DIAGNOSIS — I10 ESSENTIAL HYPERTENSION: Primary | ICD-10-CM

## 2017-09-14 RX ORDER — CLONIDINE HYDROCHLORIDE 0.2 MG/1
0.2 TABLET ORAL 2 TIMES DAILY
Qty: 60 TAB | Refills: 6 | Status: SHIPPED | OUTPATIENT
Start: 2017-09-14 | End: 2018-07-11 | Stop reason: SDUPTHER

## 2017-09-14 RX ORDER — HEPATITIS A VACCINE 1440 [IU]/ML
INJECTION, SUSPENSION INTRAMUSCULAR
Refills: 0 | COMMUNITY
Start: 2017-08-26 | End: 2017-10-06 | Stop reason: ALTCHOICE

## 2017-09-14 RX ORDER — TETANUS TOXOID, REDUCED DIPHTHERIA TOXOID AND ACELLULAR PERTUSSIS VACCINE, ADSORBED 5; 2.5; 8; 8; 2.5 [IU]/.5ML; [IU]/.5ML; UG/.5ML; UG/.5ML; UG/.5ML
SUSPENSION INTRAMUSCULAR
Refills: 0 | COMMUNITY
Start: 2017-08-26 | End: 2017-10-06 | Stop reason: ALTCHOICE

## 2017-09-14 NOTE — PROGRESS NOTES
Clonidine renewed, losartan replaced becaise it was changed to valsartan. We may not have the right pharmacy.  Pt will be contacted

## 2017-09-14 NOTE — PROGRESS NOTES
The pharmacy is asking for a losartan renewal. The losartan was discontinued and the patient was started on Valsartan.

## 2017-10-06 ENCOUNTER — OFFICE VISIT (OUTPATIENT)
Dept: CARDIOLOGY CLINIC | Age: 37
End: 2017-10-06

## 2017-10-06 VITALS
DIASTOLIC BLOOD PRESSURE: 94 MMHG | OXYGEN SATURATION: 96 % | SYSTOLIC BLOOD PRESSURE: 141 MMHG | WEIGHT: 183 LBS | HEIGHT: 62 IN | HEART RATE: 83 BPM | BODY MASS INDEX: 33.68 KG/M2

## 2017-10-06 DIAGNOSIS — I10 ESSENTIAL HYPERTENSION: ICD-10-CM

## 2017-10-06 DIAGNOSIS — I51.7 LEFT VENTRICULAR HYPERTROPHY: ICD-10-CM

## 2017-10-06 DIAGNOSIS — R07.9 CHEST PAIN, UNSPECIFIED TYPE: ICD-10-CM

## 2017-10-06 RX ORDER — LABETALOL 200 MG/1
200 TABLET, FILM COATED ORAL 2 TIMES DAILY
Qty: 60 TAB | Refills: 12 | Status: SHIPPED | OUTPATIENT
Start: 2017-10-06 | End: 2018-09-21 | Stop reason: SDUPTHER

## 2017-10-06 RX ORDER — AMLODIPINE BESYLATE 10 MG/1
TABLET ORAL
Refills: 0 | COMMUNITY
Start: 2017-09-14 | End: 2017-10-06 | Stop reason: ALTCHOICE

## 2017-10-06 NOTE — MR AVS SNAPSHOT
Visit Information Date & Time Provider Department Dept. Phone Encounter #  
 10/6/2017 10:40 AM Radha Olson MD [facility] [phone] Upcoming Health Maintenance Date Due Pneumococcal 19-64 Medium Risk (1 of 1 - PPSV23) 2/26/2000 PAP AKA CERVICAL CYTOLOGY 2/26/2002 INFLUENZA AGE 9 TO ADULT 8/1/2017 DTaP/Tdap/Td series (2 - Td) 12/10/2025 Allergies as of 10/6/2017  Review Complete On: 10/6/2017 By: Radha Olson MD  
  
 Severity Noted Reaction Type Reactions Other Food High 06/16/2017    Anaphylaxis Old Bey seasoning Iodine  07/13/2010    Itching Shellfish Containing Products  07/13/2010    Itching Current Immunizations  Never Reviewed Name Date  
 TD Vaccine 7/27/2011  1:09 AM  
 Tdap 12/10/2015  2:53 PM  
  
 Not reviewed this visit You Were Diagnosed With   
  
 Codes Comments Essential hypertension     ICD-10-CM: I10 
ICD-9-CM: 401.9 Chest pain, unspecified type     ICD-10-CM: R07.9 ICD-9-CM: 786.50 Left ventricular hypertrophy     ICD-10-CM: I51.7 ICD-9-CM: 429.3 Vitals BP Pulse Height(growth percentile) Weight(growth percentile) SpO2 BMI  
 (!) 141/94 83 5' 2\" (1.575 m) 183 lb (83 kg) 96% 33.47 kg/m2 OB Status Smoking Status Having regular periods Current Some Day Smoker Vitals History BMI and BSA Data Body Mass Index Body Surface Area  
 33.47 kg/m 2 1.91 m 2 Preferred Pharmacy Pharmacy Name Phone [facility] [address] [facility] [phone] Your Updated Medication List  
  
   
This list is accurate as of: 10/6/17 10:47 AM.  Always use your most recent med list.  
  
  
  
  
 ADVAIR DISKUS 100-50 mcg/dose diskus inhaler Generic drug:  fluticasone-salmeterol Take 1 Puff by inhalation every twelve (12) hours. aspirin delayed-release 81 mg tablet Take  by mouth daily. cloNIDine HCl 0.2 mg tablet Commonly known as:  CATAPRES Take 1 Tab by mouth two (2) times a day. Indications: hypertension FISH OIL 1,000 mg Cap Generic drug:  omega-3 fatty acids-vitamin e Take 1 Cap by mouth daily. HYDROcodone-acetaminophen 5-325 mg per tablet Commonly known as:  Bridget Arts Take 1 Tab by mouth every four (4) hours as needed for Pain. Max Daily Amount: 6 Tabs. labetalol 200 mg tablet Commonly known as:  Drusilla Starring Take 1 Tab by mouth two (2) times a day. Indications: hypertension  
  
 lidocaine 5 % ointment Commonly known as:  XYLOCAINE Apply  to affected area two (2) times a day. OTHER  
WOMEN'S CLEANSING 4 TABLETS BID FOR 14 PROAIR HFA 90 mcg/actuation inhaler Generic drug:  albuterol  
inhale 2 puffs by mouth every 4 hours  
  
 triamcinolone acetonide 0.1 % ointment Commonly known as:  KENALOG  
apply to affected area twice a day if needed for eczema  
  
 valACYclovir 500 mg tablet Commonly known as:  VALTREX  
  
 valsartan 160 mg tablet Commonly known as:  DIOVAN Take 1 Tab by mouth daily. Indications: hypertension Prescriptions Sent to Pharmacy Refills  
 labetalol (NORMODYNE) 200 mg tablet 12 Sig: Take 1 Tab by mouth two (2) times a day. Indications: hypertension Class: Normal  
 Pharmacy: [facility] [address] [place] [address] Shruthi Ph #: [phone] Route: Oral  
  
Patient Instructions I increased you LABETALOL to 200 mg twice a day. You can use up what you have taking 2 pills twice daily until you get the new pill. Introducing [facility]! [facility] introduces Shareablee patient portal. Now you can access parts of your medical record, email your doctor's office, and request medication refills online. 1. In your internet browser, go to [email or web address] 2. Click on the First Time User? Click Here link in the Sign In box. You will see the New Member Sign Up page. 3. Enter your [employer] Access Code exactly as it appears below. You will not need to use this code after youve completed the sign-up process. If you do not sign up before the expiration date, you must request a new code. · [employer] Access Code: [email or web address] Expires: 1/4/2018 10:47 AM 
 
4. Enter the last four digits of your Social Security Number (xxxx) and Date of Birth (mm/dd/yyyy) as indicated and click Submit. You will be taken to the next sign-up page. 5. Create a [employer] ID. This will be your [employer] login ID and cannot be changed, so think of one that is secure and easy to remember. 6. Create a [employer] password. You can change your password at any time. 7. Enter your Password Reset Question and Answer. This can be used at a later time if you forget your password. 8. Enter your e-mail address. You will receive e-mail notification when new information is available in [address] 9. Click Sign Up. You can now view and download portions of your medical record. 10. Click the Download Summary menu link to download a portable copy of your medical information. If you have questions, please visit the Frequently Asked Questions section of the [employer] website. Remember, [employer] is NOT to be used for urgent needs. For medical emergencies, dial 911. Now available from your iPhone and Android! Please provide this summary of care documentation to your next provider. Your primary care clinician is listed as [facility]. If you have any questions after today's visit, please call [phone].

## 2017-10-06 NOTE — LETTER
NOTIFICATION OF RETURN TO WORK / SCHOOL 
 
10/6/2017 11:00 AM 
 
Ms. Que Bueno 
[address] [id number] [place] To Whom It May Concern: 
 
Que Bueno was under the care of [address] [facility] [address] She will be able to return to school on 10/9/17 if you have questions or concerns please contact our office.  
 
Sincerely, 
 
 
 
 
Jose Bhakta MD

## 2017-10-06 NOTE — PROGRESS NOTES
2 episodes of sharp substerna;l chest pain while driving, lasting 1-2 minutes, sudden onset, sudden offset. Both cases post prandial. (oine hour). No acidy burping or nausea. She is not active otherwise, not climbing stairs, carrying things, etc. Tired but no chest pain or dyspnea pushing [employer] cart. Emotional upset makes her SOB    Last EKG showed LVH. She likes the clonidine because she has insomnia. Her daughter reports that she snores but there is no story for obstructive behavior. Her insomnia appears to be primarily rather than secondary. Oral pharyngeal examination does not show significant obstructive features. The patient reports that she gets periods of rapid pulse rate or tachycardia but this is not associated with the chest discomfort. She has a blood pressure cuff but she has not been doing home measurements. On examination, blood pressure is 141/94 after taking all of her medications today. There is no peripheral edema, there is no sign of DVT. Peripheral pulses are intact. There is no abdominal bruit or pulsation. The abdomen is obese. There is no tenderness. Lungs are clear. Cardiac auscultation shows regular rhythm with normal quality S1 and S2 with a soft late systolic murmur along the left sternal border. There is no diastolic murmur or gallop. Jugular veins are flat in the seated position, carotids are silent, thyroid is symmetrical and does not seem enlarged.     Impression: Chest pain is not typical for cardiac disease and very likely represents GERD    Hypertension with incomplete control on current medications    Primary insomnia accidentally treated by clonidine    Plan:  Patient advised to use Tums or other tablet antacid for the type of chest pain she is noticing and report the result    Increase labetalol to 200 mg twice daily with no other change in her existing medications    Monthly follow-up at this time

## 2017-10-06 NOTE — PATIENT INSTRUCTIONS
I increased you LABETALOL to 200 mg twice a day. You can use up what you have taking 2 pills twice daily until you get the new pill.

## 2017-11-03 ENCOUNTER — OFFICE VISIT (OUTPATIENT)
Dept: CARDIOLOGY CLINIC | Age: 37
End: 2017-11-03

## 2017-11-03 DIAGNOSIS — R06.02 SOB (SHORTNESS OF BREATH): Primary | ICD-10-CM

## 2017-11-05 NOTE — PATIENT INSTRUCTIONS
If you are unable to keep an appointment, please be so kind as to let us know so that someone else can use your slot

## 2017-12-14 NOTE — PROGRESS NOTES
We have received electronic renewal requests for amlodipine and losartan. It is obvious that the mail-order pharmacies do not see or acknowledge discontinuation orders.   The only blood pressure medications in place at this time are labetalol and valsartan in addition to the remaining clonidine

## 2017-12-29 ENCOUNTER — OFFICE VISIT (OUTPATIENT)
Dept: CARDIOLOGY CLINIC | Age: 37
End: 2017-12-29

## 2017-12-29 VITALS
OXYGEN SATURATION: 97 % | BODY MASS INDEX: 35.07 KG/M2 | SYSTOLIC BLOOD PRESSURE: 144 MMHG | WEIGHT: 190.6 LBS | DIASTOLIC BLOOD PRESSURE: 76 MMHG | HEART RATE: 71 BPM | HEIGHT: 62 IN

## 2017-12-29 DIAGNOSIS — R00.2 INTERMITTENT PALPITATIONS: ICD-10-CM

## 2017-12-29 DIAGNOSIS — I10 ESSENTIAL HYPERTENSION: Primary | ICD-10-CM

## 2017-12-29 RX ORDER — DIVALPROEX SODIUM 500 MG/1
TABLET, DELAYED RELEASE ORAL
Refills: 0 | COMMUNITY
Start: 2017-11-10 | End: 2020-08-06

## 2017-12-29 RX ORDER — FLUOXETINE HYDROCHLORIDE 20 MG/1
CAPSULE ORAL
Refills: 0 | COMMUNITY
Start: 2017-11-10 | End: 2020-08-06

## 2017-12-29 NOTE — MR AVS SNAPSHOT
Visit Information Date & Time Provider Department Dept. Phone Encounter #  
 12/29/2017 10:35 AM Anirudh Dunbar MD [facility] at [facility] [id number] Upcoming Health Maintenance Date Due Pneumococcal 19-64 Medium Risk (1 of 1 - PPSV23) 2/26/2000 PAP AKA CERVICAL CYTOLOGY 2/26/2002 Influenza Age 5 to Adult 8/1/2017 DTaP/Tdap/Td series (2 - Td) 12/10/2025 Allergies as of 12/29/2017  Review Complete On: 12/29/2017 By: Andree Carrion Severity Noted Reaction Type Reactions Other Food High 06/16/2017    Anaphylaxis Old Bey seasoning Iodine  07/13/2010    Itching Shellfish Containing Products  07/13/2010    Itching Current Immunizations  Never Reviewed Name Date  
 TD Vaccine 7/27/2011  1:09 AM  
 Tdap 12/10/2015  2:53 PM  
  
 Not reviewed this visit You Were Diagnosed With   
  
 Codes Comments Essential hypertension    -  Primary ICD-10-CM: I10 
ICD-9-CM: 401.9 Intermittent palpitations     ICD-10-CM: R00.2 ICD-9-CM: 785.1 Vitals BP Pulse Height(growth percentile) Weight(growth percentile) SpO2 BMI  
 144/76 71 5' 2\" (1.575 m) 190 lb 9.6 oz (86.5 kg) 97% 34.86 kg/m2 OB Status Smoking Status Having regular periods Current Some Day Smoker Vitals History BMI and BSA Data Body Mass Index Body Surface Area 34.86 kg/m 2 1.95 m 2 Preferred Pharmacy Pharmacy Name Phone [address] [place] Paulo Marrufo [phone] Your Updated Medication List  
  
   
This list is accurate as of: 12/29/17 11:52 AM.  Always use your most recent med list.  
  
  
  
  
 ADVAIR DISKUS 100-50 mcg/dose diskus inhaler Generic drug:  fluticasone-salmeterol Take 1 Puff by inhalation every twelve (12) hours. aspirin delayed-release 81 mg tablet Take  by mouth daily. cloNIDine HCl 0.2 mg tablet Commonly known as:  CATAPRES Take 1 Tab by mouth two (2) times a day. Indications: hypertension  
  
 divalproex  mg tablet Commonly known as:  DEPAKOTE  
take 1 tablet by mouth twice a day for ANGER AND MOOD STABILITY FISH OIL 1,000 mg Cap Generic drug:  omega-3 fatty acids-vitamin e Take 1 Cap by mouth daily. FLUoxetine 20 mg capsule Commonly known as:  PROzac  
take 1 capsule by mouth once daily for depression and anxiety  
  
 labetalol 200 mg tablet Commonly known as:  Rosalva Runner Take 1 Tab by mouth two (2) times a day. Indications: hypertension  
  
 lidocaine 5 % ointment Commonly known as:  XYLOCAINE Apply  to affected area two (2) times a day. PROAIR HFA 90 mcg/actuation inhaler Generic drug:  albuterol  
inhale 2 puffs by mouth every 4 hours  
  
 triamcinolone acetonide 0.1 % ointment Commonly known as:  KENALOG  
apply to affected area twice a day if needed for eczema  
  
 valACYclovir 500 mg tablet Commonly known as:  VALTREX  
  
 valsartan 160 mg tablet Commonly known as:  DIOVAN Take 1 Tab by mouth daily. Indications: hypertension VITAMIN D3 PO Take  by mouth daily. To-Do List   
 Around 12/29/2017 ECG:  ECG EVENT RECORD MONITORING SET-UP Patient Instructions I have ordered a [address] for you to keep for one month, to capture the palpitation or abnormal heart beat you are feeloing Introducing [facility]! Dina Holm introduces ProviderTrust patient portal. Now you can access parts of your medical record, email your doctor's office, and request medication refills online. 1. In your internet browser, go to [email or web address] 2. Click on the First Time User? Click Here link in the Sign In box. You will see the New Member Sign Up page. 3. Enter your ProviderTrust Access Code exactly as it appears below.  You will not need to use this code after youve completed the sign-up process. If you do not sign up before the expiration date, you must request a new code. · Evermind Access Code: [email or web address] Expires: 1/4/2018  9:47 AM 
 
4. Enter the last four digits of your Social Security Number (xxxx) and Date of Birth (mm/dd/yyyy) as indicated and click Submit. You will be taken to the next sign-up page. 5. Create a Evermind ID. This will be your Evermind login ID and cannot be changed, so think of one that is secure and easy to remember. 6. Create a Evermind password. You can change your password at any time. 7. Enter your Password Reset Question and Answer. This can be used at a later time if you forget your password. 8. Enter your e-mail address. You will receive e-mail notification when new information is available in [address] 9. Click Sign Up. You can now view and download portions of your medical record. 10. Click the Download Summary menu link to download a portable copy of your medical information. If you have questions, please visit the Frequently Asked Questions section of the Evermind website. Remember, Evermind is NOT to be used for urgent needs. For medical emergencies, dial 911. Now available from your iPhone and Android! Please provide this summary of care documentation to your next provider. Your primary care clinician is listed as [facility]. If you have any questions after today's visit, please call [phone].

## 2017-12-29 NOTE — PATIENT INSTRUCTIONS
I have [address] for you to keep for one month, to capture the palpitation or abnormal heart beat you are feeloing

## 2017-12-29 NOTE — PROGRESS NOTES
Ms. David Jones is a 71-year-old female with a known mood disorder in treatment and combined alcohol and cannabis abuse by history who is being followed for this office because of hypertension. She is presently complaining of recurrent episode of sternal pain radiating to right inframarray crease with forceful heart beats. Seated at rest, sudden onset, lasting about one minute, felt irregular heart beat. These findings are occurring at rest without clear association with physical effort but she is also physically quite inactive. She is separately complaining of very Frequent long lasting headache. I will mention this to her primary care provider. On examination today, she is 5 feet 2 inches tall, she weighs 192 pounds. Body mass index is 35. 12. Room air oxygen saturation is 97%. Blood pressure is 144/76. Pulse is 71 and regular. Peripheral pulses are intact. There is 2+ ankle edema without any indication of DVT and without chronic skin changes. Abdomen is obese but negative for bruit, pulsation, mass, and apparent organ enlargement. Liver seems normal by ballottement lungs are clear without wheezing. Cardiac auscultation shows regular rhythm with normal quality S1 and S2.  There is a very soft S4 with respiratory variation    Impression: Hypertension with hypertensive heart disease    Probable slow progression of diastolic dysfunction creating increasing vulnerability to atrial arrhythmia along with proneness to atrial irritability    Underlying mood disorder and obesity induced physical inactivity      Plan:  Patient triggered cardiac event recorder    If this does not provide any answers she will need perfusion stress testing, but I doubt that coronary artery disease will be an issue    Close follow-up in this office    Defer headache consideration to primary care

## 2018-01-08 ENCOUNTER — HOSPITAL ENCOUNTER (OUTPATIENT)
Dept: NON INVASIVE DIAGNOSTICS | Age: 38
Discharge: HOME | End: 2018-01-08
Attending: INTERNAL MEDICINE
Payer: MEDICARE

## 2018-01-08 DIAGNOSIS — R00.2 INTERMITTENT PALPITATIONS: ICD-10-CM

## 2018-01-08 PROCEDURE — 93270 REMOTE 30 DAY ECG REV/REPORT: CPT

## 2018-01-08 NOTE — PROGRESS NOTES
Pt was held up in registration, arrived 1030 was 1110 before registration completed. Explained event monitor to th patient and she was able to verbally as well as demonstrate proficiency with t he box. notong to return to post office in pre paid pack 30 days from today. extra supplies given to pt. Attempted to trans jagjit with only one tracing going over, noted to patient to try from her home later today if difficutly a  New box may be needed. pt was aware of this

## 2018-01-11 ENCOUNTER — TELEPHONE (OUTPATIENT)
Dept: CARDIOLOGY CLINIC | Age: 38
End: 2018-01-11

## 2018-01-11 NOTE — TELEPHONE ENCOUNTER
Faxed received from [employer] for medication refills.  For losartan potassium 100mg and amlodipine besylate 10mg forwarded to ALINE lisa for approval .

## 2018-02-02 ENCOUNTER — OFFICE VISIT (OUTPATIENT)
Dept: CARDIOLOGY CLINIC | Age: 38
End: 2018-02-02

## 2018-02-02 VITALS
DIASTOLIC BLOOD PRESSURE: 83 MMHG | HEIGHT: 62 IN | BODY MASS INDEX: 35.33 KG/M2 | HEART RATE: 72 BPM | SYSTOLIC BLOOD PRESSURE: 124 MMHG | WEIGHT: 192 LBS

## 2018-02-02 DIAGNOSIS — I10 ESSENTIAL HYPERTENSION: Primary | ICD-10-CM

## 2018-02-02 DIAGNOSIS — R94.31 ABNORMAL EKG: ICD-10-CM

## 2018-02-02 DIAGNOSIS — I51.7 LVH (LEFT VENTRICULAR HYPERTROPHY): ICD-10-CM

## 2018-02-02 DIAGNOSIS — R07.9 CHEST PAIN, UNSPECIFIED TYPE: ICD-10-CM

## 2018-02-02 DIAGNOSIS — I51.9 DIASTOLIC DYSFUNCTION, LEFT VENTRICLE: ICD-10-CM

## 2018-02-02 NOTE — PATIENT INSTRUCTIONS
-- We've ordered a nuclear medicine stress test  -- When scheduling call you to set this up, please ask that they schedule you here at [facility]    -- Make a 2 week follow up to go over these results and to discuss next steps         Cardiac Perfusion Scan (Medicine): About This Test  What is it? A cardiac perfusion scan measures the amount of blood in your heart muscle at rest and after your heart has been made to work hard. During the scan, a camera takes pictures of your heart after a radioactive tracer is injected into a vein in your arm. The tracer travels through the blood and into your heart. As the tracer moves through your heart, areas that have good blood flow absorb the tracer. Areas that do not absorb the tracer may not be getting enough blood or may have been damaged by a heart attack. The pictures show this difference. Two sets of pictures may be made during the test. One set is taken while you are resting. Another set is taken after your heart has been made to work harder (called a stress test). The heart can be stressed by using medicine or exercise. This information is about using medicine to stress the heart. This test is also known by other names, including myocardial perfusion scan, myocardial perfusion imaging, thallium scan, sestamibi cardiac scan, and nuclear stress test.  Why is this test done? The test is often done to find out what may be causing chest pain or pressure. It may be done after a heart attack to see if areas of the heart are not getting enough blood or to find out how much your heart has been damaged from the heart attack. How can you prepare for the test?  Tell your doctor if:  · You take medicine for an erection problem, such as sildenafil (Viagra), tadalafil (Cialis), and vardenafil (Levitra).  You may need to take nitroglycerin during this test, which can cause a serious reaction if you have taken a medicine for an erection problem within the past 48 hours.  · You have had bleeding problems, or if you take aspirin or some other blood thinner. · You are or might be pregnant. · You are breastfeeding. Don't use your breast milk for 1 to 2 days after the scan. Use formula instead. What happens during the test?  Resting or baseline scan  · You will take your top off and be given a gown to wear. · Electrodes will be attached to your chest to keep track of your heartbeats. · Your arm will be cleaned. You will have a tube, called an IV, going into your arm. A small amount of the radioactive tracer will be put in the IV. · You will lie on your back or your stomach on a table with a large camera positioned above your chest. The camera records the tracer's signals as it moves through your blood. The camera does not produce any radiation, so you are not exposed to any additional radiation while the scan is being done. · You will be asked to remain very still during each scan, which takes about 5 to 10 minutes. The camera will move to take more pictures at different angles. Several scans will be taken. This test takes about 30 to 40 minutes. Stress scan using medicine  The stress scan is done in two parts. In many hospitals, you first have the resting scan. You are then given a medicine that makes your heart work harder and you have another scan. Sometimes the stress scan is done first.  · You will have a test called an electrocardiogram (EKG or ECG), which takes about 5 to 10 minutes. You may have other EKGs during and after the stress test.  · Medicine will be put in your IV. It will make your heart work harder. You may get a headache and feel dizzy, flushed, and nauseated from the medicine, but these symptoms usually do not last long. · Your heartbeat and blood pressure may be checked. · Your symptoms such as chest pain and shortness of breath will be checked. · A few minutes after you get the medicine, another small amount of radioactive tracer is injected. You may be given something to reverse the medicine used to make your heart work harder. · You will wait for 30 to 40 minutes and then have another resting scan. See the \"Resting or baseline scan\" section. This test takes about 30 to 40 minutes. What else should you know about the test?  · Sometimes more pictures are taken 2 to 4 hours after the stress scan. · No electricity passes through your body during the test. There is no danger of getting an electrical shock. What happens after the test?  · You can go back to your usual activities right away. · You will probably be able to go home right away. · Drink plenty of fluids for the next 24 hours to help flush the tracer out of your body. If you have kidney,heart, or liver disease and have to limit fluids, talk with your doctor before you increase the amountof fluids you drink. When should you call for help? Call 911 anytime you think you may need emergency care. For example, call if:  · You passed out (lost consciousness). · You have been diagnosed with angina, and you have angina symptoms that do not go away with rest or are not getting better within 5 minutes after you take a dose of nitroglycerin. · You have symptoms of a heart attack. These may include:  ¨ Chest pain or pressure, or a strange feeling in the chest.  ¨ Sweating. ¨ Shortness of breath. ¨ Nausea or vomiting. ¨ Pain, pressure, or a strange feeling in the back, neck, jaw, or upper belly or in one or both shoulders or arms. ¨ Lightheadedness or sudden weakness. ¨ A fast or irregular heartbeat. After you call 911, the  may tell you to chew 1 adult-strength or 2 to 4 low-dose aspirin. Wait for an ambulance. Do not try to drive yourself. Watch closely for changes in your health, and be sure to contact your doctor if you have any problems. Follow-up care is a key part of your treatment and safety.  Be sure to make and go to all appointments, and call your doctor if you are having problems. It's also a good idea to keep a list of the medicines you take. Ask your doctor when you can expect to have your test results. Where can you learn more? Go to http://rachel-velma.info/. Enter R320 in the search box to learn more about \"Cardiac Perfusion Scan (Medicine): About This Test.\"  Current as of: September 21, 2016  Content Version: 11.4  © 7038-3716 [employer]. Care instructions adapted under license by [employer] (which disclaims liability or warranty for this information). If you have questions about a medical condition or this instruction, always ask your healthcare professional. [address] any warranty or liability for your use of this information.

## 2018-02-02 NOTE — PROGRESS NOTES
Chief Complaint   Patient presents with   [address]     pt c/o chest pain,sob, headaches,tachycardia , palpitations. 1. Have you been to the ER, urgent care clinic since your last visit? Hospitalized since your last visit? No    2. Have you seen or consulted any other health care providers outside of the [address] [place] [address] since your last visit? Include any pap smears or colon screening.  Yes When: Maria Ines Knapp obgyn seen for thyroid , [address] [facility] 2/1/18

## 2018-02-02 NOTE — PROGRESS NOTES
[facility]   [address] [facility], [address] [place] [address]                                          NEW PATIENT HPI/FOLLOW-UP      NAME:  Ad[place]so [place]   :   1980   MRN:   [id number]   PCP:  Francisco Vila MD           Subjective: The patient is a 40y.o. year old female smoker with h/o HTN and mood disorder who returns for follow-up on event recorder. Pt has 5 days left on the recorder, but a preliminary report has been done. Pt reports exertional chest pain and Dyspnea, accompanied by palpitations. Chest pain is sharp, mid-sternal and radiates to left side. She has sent recordings with a diary of these symptoms. She is taking a daily ASA not because of heart disease but because she has daily migraines and \"BC powder is all that I use\", which helps her HA. Denies change in exercise tolerance, edema, medication intolerance, PND/orthopnea, wheezing, sputum, syncope, dizziness or light headedness. Doing satisfactorily. Review of Systems  General: Pt denies excessive weight gain or loss. Pt is able to conduct ADL's. Respiratory: +shortness of breath, SMITH, Denies wheezing or stridor.   Cardiovascular: +precordial pain, palpitations, Denies edema or PND  Gastrointestinal: Denies poor appetite, indigestion, abdominal pain or blood in stool  Peripheral vascular: Denies claudication, leg cramps  Neuropsychiatric: Denies paresthesias,tingling,numbness,anxiety,depression,fatigue  Musculoskeletal: Denies pain,tenderness, soreness,swelling      Past Medical History:   Diagnosis Date    Abscess 2016    CAD (coronary artery disease)     enlarged heart    Delivery normal     Hypertension     Psychiatric disorder     depresssion    Tubal ligation status 12/10/15     Patient Active Problem List    Diagnosis Date Noted    Chest wall pain 2016    Essential hypertension 2016    Mood disorder ([facility] [address] 2016    Alcohol abuse 2016    Cannabis abuse 2016  Abscess 07/11/2016      Past Surgical History:   Procedure Laterality Date    HX GYN      tubal ligation    HX ORTHOPAEDIC      tubal pregnancy removal    HX OTHER SURGICAL      abscesses     Allergies   Allergen Reactions    Other Food Anaphylaxis     Old Bey seasoning    Iodine Itching    Shellfish Containing Products Itching      History reviewed. No pertinent family history. Social History     Social History    Marital status: SINGLE     Spouse name: N/A    Number of children: N/A    Years of education: N/A     Occupational History    Not on file. Social History Main Topics    Smoking status: Current Some Day Smoker     Packs/day: 0.25    Smokeless tobacco: Never Used    Alcohol use Yes      Comment: socially    Drug use: No    Sexual activity: No     Other Topics Concern    Not on file     Social History Narrative      Current Outpatient Prescriptions   Medication Sig    FLUoxetine (PROZAC) 20 mg capsule take 1 capsule by mouth once daily for depression and anxiety    divalproex DR (DEPAKOTE) 500 mg tablet take 1 tablet by mouth twice a day for ANGER AND MOOD STABILITY    CHOLECALCIFEROL, VITAMIN D3, (VITAMIN D3 PO) Take  by mouth daily.  labetalol (NORMODYNE) 200 mg tablet Take 1 Tab by mouth two (2) times a day. Indications: hypertension    cloNIDine HCl (CATAPRES) 0.2 mg tablet Take 1 Tab by mouth two (2) times a day. Indications: hypertension    valACYclovir (VALTREX) 500 mg tablet     valsartan (DIOVAN) 160 mg tablet Take 1 Tab by mouth daily. Indications: hypertension    fluticasone-salmeterol (ADVAIR DISKUS) 100-50 mcg/dose diskus inhaler Take 1 Puff by inhalation every twelve (12) hours.  lidocaine (XYLOCAINE) 5 % ointment Apply  to affected area two (2) times a day.  omega-3 fatty acids-vitamin e (FISH OIL) 1,000 mg cap Take 1 Cap by mouth daily.  aspirin delayed-release 81 mg tablet Take  by mouth daily.     PROAIR HFA 90 mcg/actuation inhaler inhale 2 puffs by mouth every 4 hours    triamcinolone acetonide (KENALOG) 0.1 % ointment apply to affected area twice a day if needed for eczema     No current facility-administered medications for this visit. I have reviewed the nurses notes, vitals, problem list, allergy list, medical history, family medical, social history and medications. Objective:     Physical Exam:     Vitals:    02/02/18 1046 02/02/18 1107   BP: 134/87 124/83   Pulse: 74 72   Weight: 192 lb (87.1 kg)    Height: 5' 2\" (1.575 m)     Body mass index is 35.12 kg/(m^2). General: WDWN adult female, in no acute distress. Pleasant affect. HEENT: No carotid bruits, no JVD, trach is midline. Heart:  Normal S1/S2 negative S3 or S4. Regular, no murmur, gallop or rub.   Respiratory: Clear bilaterally, no wheezing or rales  Abdomen:   Soft, non-tender, bowel sounds are active.   Extremities:  No edema, normal cap refill, no cyanosis. Neuro: A&Ox3, speech clear, gait stable. Skin: Skin color is normal. No rashes or lesions. No diaphoresis. Vascular: 2+ pulses symmetric in all extremities        Data Review:       Cardiographics:    EKG interpretation:  Rhythm: normal sinus rhythm; and regular . Rate (approx.): 68; Axis: normal; P wave: normal; QRS interval: normal ; ST/T wave: elevated ; in  Lead: V1 - V3 and minimal ST segment depression with T-wave inversion in V5-V6; Other findings: LVH by voltage criteria but cannot rule out anterolateral ischemia. This EKG was interpreted by Sergei Han PA-C.         Cardiology Labs:    Results for orders placed or performed during the hospital encounter of 11/06/16   EKG, 12 LEAD, INITIAL   Result Value Ref Range    Ventricular Rate 84 BPM    Atrial Rate 84 BPM    P-R Interval 156 ms    QRS Duration 82 ms    Q-T Interval 400 ms    QTC Calculation (Bezet) 472 ms    Calculated P Axis 36 degrees    Calculated R Axis 1 degrees    Calculated T Axis 135 degrees    Diagnosis       Normal sinus rhythm  Possible Left atrial enlargement  Left ventricular hypertrophy with repolarization abnormality  Cannot rule out Septal infarct , age undetermined    Confirmed by Sid Vo ([id number]) on 11/7/2016 1:54:12 PM         No results found for: CHOL, CHOLX, CHLST, CHOLV, 833234, HDL, LDL, LDLC, DLDLP, Carl Patrick, [facility], [facility]    Lab Results   Component Value Date/Time    Sodium 138 11/06/2016 06:08 PM    Potassium 3.3 11/06/2016 06:08 PM    Chloride 105 11/06/2016 06:08 PM    CO2 24 11/06/2016 06:08 PM    Anion gap 9 11/06/2016 06:08 PM    Glucose 98 11/06/2016 06:08 PM    BUN 17 11/06/2016 06:08 PM    Creatinine 0.85 11/06/2016 06:08 PM    BUN/Creatinine ratio 20 11/06/2016 06:08 PM    GFR est AA >60 11/06/2016 06:08 PM    GFR est non-AA >60 11/06/2016 06:08 PM    Calcium 8.2 11/06/2016 06:08 PM    Bilirubin, total 0.3 11/06/2016 06:08 PM    AST (SGOT) 18 11/06/2016 06:08 PM    Alk. phosphatase 67 11/06/2016 06:08 PM    Protein, total 7.1 11/06/2016 06:08 PM    Albumin 3.4 11/06/2016 06:08 PM    Globulin 3.7 11/06/2016 06:08 PM    A-G Ratio 0.9 11/06/2016 06:08 PM    ALT (SGPT) 46 11/06/2016 06:08 PM          Assessment:       ICD-10-CM ICD-9-CM    1. Essential hypertension I10 401.9 AMB POC EKG ROUTINE W/ 12 LEADS, INTER & REP   2. LVH (left ventricular hypertrophy) I51.7 429.3    3. Diastolic dysfunction, left ventricle I51.9 429.9    4. Abnormal EKG R94.31 794.31 NUCLEAR STRESS TEST      NM CARDIAC SPECT W STRS/REST MULT   5. Chest pain, unspecified type R07.9 786.50 NUCLEAR STRESS TEST      NM CARDIAC SPECT W STRS/REST MULT         Discussion: Patient presents at this time stable from a cardiac perspective. BP was well controlled. Prelim on Event Monitor reports:  \"There have been a total of 12 transmissions by the patient so far. Gayle Crawford all demonstrate sinus rhythm with a relatively high voltage QRS and with variable ST segment depression and T-wave inversion reminiscent of left ventricular hypertrophy.  The findings could suggest ischemia.  Symptoms related to the findings include shortness of breath and episodic chest pain.  A complaint of racing heart is associated with sinus tachycardia at 1 16/min with average 1 mm ST segment depression. \"    T wave inversions reminiscent of LVH strain pattern also on EKG. However, given pt's c/o chest pain and SMITH with exertion, will do nuclear perfusion stress imaging to r/o ischemic basis for sx and changes. Pleased with present status. Discussed/seen with Dr. Keke Bueno: 1. Continue same meds. -- NM stress test to evaluate chest pain on exertion with lateral T wave inversion    2. Encouraged to exercise to tolerance, lose weight, stop smoking and follow low fat, low cholesterol, low sodium predominantly Plant-based (consider Mediterranean) diet. 3.Follow up: 2 weeks   --  Call with questions or concerns. I have discussed the diagnosis with the patient and the intended plan as seen in the above orders. The patient has received an after-visit summary and questions were answered concerning future plans. I have discussed any concerning medication side effects and warnings with the patient as well.     Mio Pace PA-C  2/2/2018

## 2018-02-02 NOTE — MR AVS SNAPSHOT
[facility] 
 
 
 [address] 
[phone] Patient: Abbey Avendaño 
MRN: [id number] PNL:4/60/1528 Visit Information Date & Time Provider Department Dept. Phone Encounter #  
 2/2/2018 10:20 AM Trisha Marie MD [facility] at [address] [place] [address] Upcoming Health Maintenance Date Due Pneumococcal 19-64 Medium Risk (1 of 1 - PPSV23) 2/26/2000 PAP AKA CERVICAL CYTOLOGY 2/26/2002 Influenza Age 5 to Adult 8/1/2017 DTaP/Tdap/Td series (2 - Td) 12/10/2025 Allergies as of 2/2/2018  Review Complete On: 2/2/2018 By: Kulwant Jimenez PA-C Severity Noted Reaction Type Reactions Other Food High 06/16/2017    Anaphylaxis Old Bey seasoning Iodine  07/13/2010    Itching Shellfish Containing Products  07/13/2010    Itching Current Immunizations  Never Reviewed Name Date  
 TD Vaccine 7/27/2011  1:09 AM  
 Tdap 12/10/2015  2:53 PM  
  
 Not reviewed this visit You Were Diagnosed With   
  
 Codes Comments Essential hypertension    -  Primary ICD-10-CM: I10 
ICD-9-CM: 401.9 LVH (left ventricular hypertrophy)     ICD-10-CM: I51.7 ICD-9-CM: 429.3 Diastolic dysfunction, left ventricle     ICD-10-CM: I51.9 ICD-9-CM: 429.9 Abnormal EKG     ICD-10-CM: R94.31 
ICD-9-CM: 794.31 Chest pain, unspecified type     ICD-10-CM: R07.9 ICD-9-CM: 786.50 Vitals BP Pulse Height(growth percentile) Weight(growth percentile) BMI OB Status 124/83 (BP 1 Location: Right arm, BP Patient Position: Sitting) 72 5' 2\" (1.575 m) 192 lb (87.1 kg) 35.12 kg/m2 Having regular periods Smoking Status Current Some Day Smoker Vitals History BMI and BSA Data Body Mass Index Body Surface Area  
 35.12 kg/m 2 1.95 m 2 Preferred Pharmacy Pharmacy Name Phone [address] [facility] [phone] Your Updated Medication List  
  
   
This list is accurate as of: 2/2/18 11:37 AM.  Always use your most recent med list.  
  
  
  
  
 ADVAIR DISKUS 100-50 mcg/dose diskus inhaler Generic drug:  fluticasone-salmeterol Take 1 Puff by inhalation every twelve (12) hours. aspirin delayed-release 81 mg tablet Take  by mouth daily. cloNIDine HCl 0.2 mg tablet Commonly known as:  CATAPRES Take 1 Tab by mouth two (2) times a day. Indications: hypertension  
  
 divalproex  mg tablet Commonly known as:  DEPAKOTE  
take 1 tablet by mouth twice a day for ANGER AND MOOD STABILITY FISH OIL 1,000 mg Cap Generic drug:  omega-3 fatty acids-vitamin e Take 1 Cap by mouth daily. FLUoxetine 20 mg capsule Commonly known as:  PROzac  
take 1 capsule by mouth once daily for depression and anxiety  
  
 labetalol 200 mg tablet Commonly known as:  Ce Music Take 1 Tab by mouth two (2) times a day. Indications: hypertension  
  
 lidocaine 5 % ointment Commonly known as:  XYLOCAINE Apply  to affected area two (2) times a day. PROAIR HFA 90 mcg/actuation inhaler Generic drug:  albuterol  
inhale 2 puffs by mouth every 4 hours  
  
 triamcinolone acetonide 0.1 % ointment Commonly known as:  KENALOG  
apply to affected area twice a day if needed for eczema  
  
 valACYclovir 500 mg tablet Commonly known as:  VALTREX  
  
 valsartan 160 mg tablet Commonly known as:  DIOVAN Take 1 Tab by mouth daily. Indications: hypertension VITAMIN D3 PO Take  by mouth daily. We Performed the Following AMB POC EKG ROUTINE W/ 12 LEADS, INTER & REP [57875 CPT(R)] To-Do List   
 02/02/2018 Imaging:  NM CARDIAC SPECT W STRS/REST MULT   
  
 02/02/2018 ECG:  NUCLEAR STRESS TEST Patient Instructions   
-- We've ordered a nuclear medicine stress test 
 -- When scheduling call you to set this up, please ask that they schedule you here at [facility] -- Make a 2 week follow up to go over these results and to discuss next steps Cardiac Perfusion Scan (Medicine): About This Test 
What is it? A cardiac perfusion scan measures the amount of blood in your heart muscle at rest and after your heart has been made to work hard. During the scan, a camera takes pictures of your heart after a radioactive tracer is injected into a vein in your arm. The tracer travels through the blood and into your heart. As the tracer moves through your heart, areas that have good blood flow absorb the tracer. Areas that do not absorb the tracer may not be getting enough blood or may have been damaged by a heart attack. The pictures show this difference. Two sets of pictures may be made during the test. One set is taken while you are resting. Another set is taken after your heart has been made to work harder (called a stress test). The heart can be stressed by using medicine or exercise. This information is about using medicine to stress the heart. This test is also known by other names, including myocardial perfusion scan, myocardial perfusion imaging, thallium scan, sestamibi cardiac scan, and nuclear stress test. 
Why is this test done? The test is often done to find out what may be causing chest pain or pressure. It may be done after a heart attack to see if areas of the heart are not getting enough blood or to find out how much your heart has been damaged from the heart attack. How can you prepare for the test? 
Tell your doctor if: 
· You take medicine for an erection problem, such as sildenafil (Viagra), tadalafil (Cialis), and vardenafil (Levitra). You may need to take nitroglycerin during this test, which can cause a serious reaction if you have taken a medicine for an erection problem within the past 48 hours. · You have had bleeding problems, or if you take aspirin or some other blood thinner. · You are or might be pregnant. · You are breastfeeding. Don't use your breast milk for 1 to 2 days after the scan. Use formula instead. What happens during the test? 
Resting or baseline scan · You will take your top off and be given a gown to wear. · Electrodes will be attached to your chest to keep track of your heartbeats. · Your arm will be cleaned. You will have a tube, called an IV, going into your arm. A small amount of the radioactive tracer will be put in the IV. · You will lie on your back or your stomach on a table with a large camera positioned above your chest. The camera records the tracer's signals as it moves through your blood. The camera does not produce any radiation, so you are not exposed to any additional radiation while the scan is being done. · You will be asked to remain very still during each scan, which takes about 5 to 10 minutes. The camera will move to take more pictures at different angles. Several scans will be taken. This test takes about 30 to 40 minutes. Stress scan using medicine The stress scan is done in two parts. In many hospitals, you first have the resting scan. You are then given a medicine that makes your heart work harder and you have another scan. Sometimes the stress scan is done first. 
· You will have a test called an electrocardiogram (EKG or ECG), which takes about 5 to 10 minutes. You may have other EKGs during and after the stress test. 
· Medicine will be put in your IV. It will make your heart work harder. You may get a headache and feel dizzy, flushed, and nauseated from the medicine, but these symptoms usually do not last long. · Your heartbeat and blood pressure may be checked. · Your symptoms such as chest pain and shortness of breath will be checked.  
· A few minutes after you get the medicine, another small amount of radioactive tracer is injected. You may be given something to reverse the medicine used to make your heart work harder. · You will wait for 30 to 40 minutes and then have another resting scan. See the \"Resting or baseline scan\" section. This test takes about 30 to 40 minutes. What else should you know about the test? 
· Sometimes more pictures are taken 2 to 4 hours after the stress scan. · No electricity passes through your body during the test. There is no danger of getting an electrical shock. What happens after the test? 
· You can go back to your usual activities right away. · You will probably be able to go home right away. · Drink plenty of fluids for the next 24 hours to help flush the tracer out of your body. If you have kidney,heart, or liver disease and have to limit fluids, talk with your doctor before you increase the amountof fluids you drink. When should you call for help? Call 911 anytime you think you may need emergency care. For example, call if: 
· You passed out (lost consciousness). · You have been diagnosed with angina, and you have angina symptoms that do not go away with rest or are not getting better within 5 minutes after you take a dose of nitroglycerin. · You have symptoms of a heart attack. These may include: ¨ Chest pain or pressure, or a strange feeling in the chest. 
¨ Sweating. ¨ Shortness of breath. ¨ Nausea or vomiting. ¨ Pain, pressure, or a strange feeling in the back, neck, jaw, or upper belly or in one or both shoulders or arms. ¨ Lightheadedness or sudden weakness. ¨ A fast or irregular heartbeat. After you call 911, the  may tell you to chew 1 adult-strength or 2 to 4 low-dose aspirin. Wait for an ambulance. Do not try to drive yourself. Watch closely for changes in your health, and be sure to contact your doctor if you have any problems. Follow-up care is a key part of your treatment and safety.  Be sure to make and go to all appointments, and call your doctor if you are having problems. It's also a good idea to keep a list of the medicines you take. Ask your doctor when you can expect to have your test results. Where can you learn more? Go to http://rachel-velma.info/. Enter R320 in the search box to learn more about \"Cardiac Perfusion Scan (Medicine): About This Test.\" Current as of: September 21, 2016 Content Version: 11.4 © 2185-5693 App.[employer]. Care instructions adapted under license by [employer] (which disclaims liability or warranty for this information). If you have questions about a medical condition or this instruction, always ask your healthcare professional. [address] any warranty or liability for your use of this information. Introducing [facility]! [employer] introduces Skim.it patient portal. Now you can access parts of your medical record, email your doctor's office, and request medication refills online. 1. In your internet browser, go to [email or web address] 2. Click on the First Time User? Click Here link in the Sign In box. You will see the New Member Sign Up page. 3. Enter your Skim.it Access Code exactly as it appears below. You will not need to use this code after youve completed the sign-up process. If you do not sign up before the expiration date, you must request a new code. · Skim.it Access Code: [email or web address] Expires: 4/5/2018 10:27 AM 
 
4. Enter the last four digits of your Social Security Number (xxxx) and Date of Birth (mm/dd/yyyy) as indicated and click Submit. You will be taken to the next sign-up page. 5. Create a Skim.it ID. This will be your Skim.it login ID and cannot be changed, so think of one that is secure and easy to remember. 6. Create a [facility] password. You can change your password at any time. 7. Enter your Password Reset Question and Answer. This can be used at a later time if you forget your password. 8. Enter your e-mail address. You will receive e-mail notification when new information is available in [address] 9. Click Sign Up. You can now view and download portions of your medical record. 10. Click the Download Summary menu link to download a portable copy of your medical information. If you have questions, please visit the Frequently Asked Questions section of the IActionable website. Remember, IActionable is NOT to be used for urgent needs. For medical emergencies, dial 911. Now available from your iPhone and Android! Please provide this summary of care documentation to your next provider. Your primary care clinician is listed as [employer]. If you have any questions after today's visit, please call [phone].

## 2018-02-09 ENCOUNTER — HOSPITAL ENCOUNTER (OUTPATIENT)
Dept: NON INVASIVE DIAGNOSTICS | Age: 38
Discharge: HOME | End: 2018-02-09
Attending: PHYSICIAN ASSISTANT
Payer: MEDICARE

## 2018-02-09 ENCOUNTER — HOSPITAL ENCOUNTER (OUTPATIENT)
Dept: NUCLEAR MEDICINE | Age: 38
Discharge: HOME | End: 2018-02-09
Attending: PHYSICIAN ASSISTANT
Payer: MEDICARE

## 2018-02-09 DIAGNOSIS — R94.31 ABNORMAL EKG: ICD-10-CM

## 2018-02-09 DIAGNOSIS — R07.9 CHEST PAIN, UNSPECIFIED TYPE: ICD-10-CM

## 2018-02-09 PROCEDURE — 78452 HT MUSCLE IMAGE SPECT MULT: CPT

## 2018-02-09 PROCEDURE — 74011250636 HC RX REV CODE- 250/636: Performed by: INTERNAL MEDICINE

## 2018-02-09 PROCEDURE — 93017 CV STRESS TEST TRACING ONLY: CPT

## 2018-02-09 RX ORDER — SODIUM CHLORIDE 0.9 % (FLUSH) 0.9 %
SYRINGE (ML) INJECTION
Status: DISPENSED
Start: 2018-02-09 | End: 2018-02-10

## 2018-02-09 RX ORDER — SODIUM CHLORIDE 0.9 % (FLUSH) 0.9 %
10 SYRINGE (ML) INJECTION AS NEEDED
Status: DISCONTINUED | OUTPATIENT
Start: 2018-02-09 | End: 2018-02-13 | Stop reason: HOSPADM

## 2018-02-09 RX ADMIN — Medication 10 ML: at 13:23

## 2018-02-09 RX ADMIN — Medication 10 ML: at 12:00

## 2018-02-09 RX ADMIN — REGADENOSON 0.4 MG: 0.08 INJECTION, SOLUTION INTRAVENOUS at 13:21

## 2018-02-09 NOTE — PROGRESS NOTES
PROGRESS NOTE    The patient Praveen brock 40 y.o. female arrived on 2/9/2018 for an appointment in cardiology. Patient was transported to cardiology outpatient unit by Radha Michelle RN by wheelchair. RN verifies test explanation by physician with patient. Reviews test and allows for questions. No further questions. Medical history and allergies reviewed  and noted. Home Meds reviewed. Prior to Admission medications    Medication Sig Start Date End Date Taking? Authorizing Provider   FLUoxetine (PROZAC) 20 mg capsule take 1 capsule by mouth once daily for depression and anxiety 11/10/17   Historical Provider   divalproex DR (DEPAKOTE) 500 mg tablet take 1 tablet by mouth twice a day for ANGER AND MOOD STABILITY 11/10/17   Historical Provider   CHOLECALCIFEROL, VITAMIN D3, (VITAMIN D3 PO) Take  by mouth daily. Historical Provider   labetalol (NORMODYNE) 200 mg tablet Take 1 Tab by mouth two (2) times a day. Indications: hypertension 10/6/17   Aspen Gilman MD   cloNIDine HCl (CATAPRES) 0.2 mg tablet Take 1 Tab by mouth two (2) times a day. Indications: hypertension 9/14/17   Aspen Gilman MD   valACYclovir (VALTREX) 500 mg tablet  5/1/17   Historical Provider   valsartan (DIOVAN) 160 mg tablet Take 1 Tab by mouth daily. Indications: hypertension 6/21/17   Joyce Lea PA-C   fluticasone-salmeterol (ADVAIR DISKUS) 100-50 mcg/dose diskus inhaler Take 1 Puff by inhalation every twelve (12) hours. Reza Beck MD   lidocaine (XYLOCAINE) 5 % ointment Apply  to affected area two (2) times a day. 12/9/16   Joyce Lea PA-C   omega-3 fatty acids-vitamin e (FISH OIL) 1,000 mg cap Take 1 Cap by mouth daily. Reza Beck MD   aspirin delayed-release 81 mg tablet Take  by mouth daily.     Historical Provider   PROAIR HFA 90 mcg/actuation inhaler inhale 2 puffs by mouth every 4 hours 10/5/16   Historical Provider   triamcinolone acetonide (KENALOG) 0.1 % ointment apply to affected area twice a day if needed for eczema 10/5/16   Historical Provider   Pt came in late for study, then registered and then sat in her car, This rn looking for pt for over thirty minutes. Notification to Dr Rj Duffy    Patient resting on stretcher with side rails in up position for safety. All BP's noted on stress strip. Patient monitored throughout test. Physician and RN remained in room with patient throughout test.      Patient prepped for test. IV started # 24right hand by this Star Loco RN. 12 lead obtained    1300  Dr. Dilip Espana, RN Bairon MIRELES and Nuclear Medicine Tech in room, Timeout called. Universal protocol followed. Stephany Zimemrman explained test to the patient, no further questions noted. Stephany Zimmerman ran test.    Patient sitting on side of bed with seated leg exercise for Lexiscan stress test.    Patient IV checked for patency. Normal saline flush 10 cc injected and IV remains patent. [address] 0.04mg/5ml injected over ten seconds followed by saline flush of 10cc. After 40 seconds Myoview injected by Nuclear Medicine Tech and then saline flush of 10 cc injected to maintain patency of IV. Patient given snack and soda. Patient Grace Crawford monitored in unit until BP and heart rate returned to baseline. Patient allowed to dress and this RN transported patient by wheelchair Nuclear Medicine area. iv removed, tip intact, dsg placed. Daughter age [de-identified] was allowed to be in the room since mother had no other sitter , monitored by mother throughout test.Child very cooperative and quiet. This rn gave her coloring books to help with the boredom of being in room for two hours. All personal belongings returned to patient. This RN notes to patient if chest pain or shortness of breath occurs in the future, please return to the Emergency Room.

## 2018-02-10 ENCOUNTER — DOCUMENTATION ONLY (OUTPATIENT)
Dept: CARDIOLOGY CLINIC | Age: 38
End: 2018-02-10

## 2018-02-10 LAB
ATTENDING PHYSICIAN, CST07: NORMAL
DIAGNOSIS, 93000: NORMAL
DUKE TM SCORE RESULT, CST14: NORMAL
DUKE TREADMILL SCORE, CST13: NORMAL
ECG INTERP BEFORE EX, CST11: NORMAL
ECG INTERP DURING EX, CST12: NORMAL
FUNCTIONAL CAPACITY, CST17: NORMAL
KNOWN CARDIAC CONDITION, CST08: NORMAL
MAX. DIASTOLIC BP, CST04: 108 MMHG
MAX. HEART RATE, CST05: 114 BPM
MAX. SYSTOLIC BP, CST03: 181 MMHG
OVERALL BP RESPONSE TO EXERCISE, CST16: NORMAL
OVERALL HR RESPONSE TO EXERCISE, CST15: NORMAL
PEAK EX METS, CST10: 1 METS
PROTOCOL NAME, CST01: NORMAL
TEST INDICATION, CST09: NORMAL

## 2018-02-15 ENCOUNTER — DOCUMENTATION ONLY (OUTPATIENT)
Dept: CARDIOLOGY CLINIC | Age: 38
End: 2018-02-15

## 2018-02-15 ENCOUNTER — OFFICE VISIT (OUTPATIENT)
Dept: CARDIOLOGY CLINIC | Age: 38
End: 2018-02-15

## 2018-02-15 DIAGNOSIS — R06.02 SOB (SHORTNESS OF BREATH): Primary | ICD-10-CM

## 2018-02-15 DIAGNOSIS — I10 ESSENTIAL HYPERTENSION: ICD-10-CM

## 2018-04-12 RX ORDER — METRONIDAZOLE 500 MG/1
TABLET ORAL
Refills: 0 | COMMUNITY
Start: 2018-03-07 | End: 2019-05-17

## 2018-04-12 RX ORDER — NORETHINDRONE 0.35 MG
KIT ORAL
Refills: 1 | COMMUNITY
Start: 2018-03-07 | End: 2020-08-06

## 2018-05-03 ENCOUNTER — OFFICE VISIT (OUTPATIENT)
Dept: CARDIOLOGY CLINIC | Age: 38
End: 2018-05-03

## 2018-05-03 VITALS
HEART RATE: 84 BPM | DIASTOLIC BLOOD PRESSURE: 97 MMHG | HEIGHT: 62 IN | BODY MASS INDEX: 35.33 KG/M2 | WEIGHT: 192 LBS | OXYGEN SATURATION: 98 % | SYSTOLIC BLOOD PRESSURE: 158 MMHG | RESPIRATION RATE: 12 BRPM

## 2018-05-03 DIAGNOSIS — I42.2 HYPERTROPHIC CARDIOMYOPATHY (HCC): ICD-10-CM

## 2018-05-03 DIAGNOSIS — R07.89 CHEST WALL PAIN: ICD-10-CM

## 2018-05-03 DIAGNOSIS — I10 ESSENTIAL HYPERTENSION: Primary | ICD-10-CM

## 2018-05-03 DIAGNOSIS — Z82.41 FHX: SUDDEN CARDIAC DEATH (SCD): ICD-10-CM

## 2018-05-03 PROBLEM — E66.01 SEVERE OBESITY (BMI 35.0-39.9) WITH COMORBIDITY (HCC): Status: ACTIVE | Noted: 2018-05-03

## 2018-05-03 RX ORDER — SPIRONOLACTONE 25 MG/1
25 TABLET ORAL DAILY
Qty: 30 TAB | Refills: 6 | Status: SHIPPED | OUTPATIENT
Start: 2018-05-03 | End: 2019-05-17 | Stop reason: SDUPTHER

## 2018-05-03 NOTE — PROGRESS NOTES
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 She has a strong family history for cardiac sudden death with very little information available. Despite the fact that we have not demonstrated any arrhythmias in her case, we will request electrophysiologic evaluation with possibilities including consideration of ICD and consideration of cardiac MRI. The patient was given appropriate business cards for this practice with instructions to please ask any treating person who wishes to initiate a new diuretic to please contact this office first.  We started her on a low dose of spironolactone today to help control edema but she was also counseled at some length about salt avoidance.     Bairon aCrlson MD [facility]

## 2018-05-03 NOTE — PATIENT INSTRUCTIONS
-- PLEASE DON'T TAKE THE HYDROCHLORITHIAZDE! THIS IS NOT SAFE FOR YOUR ENLARGED HEART MUSCLE    -- I've sent you a new prescription for fluid that will be safe for your heart    -- If headaches don't improve please see your primary care provider about this    -- Let's see you in 1 month      Hypertrophic Cardiomyopathy: Care Instructions  Your Care Instructions    Hypertrophic cardiomyopathy (say \"hy-per-NARAYAN-yolik xht-vza-uz-vr-LOY-fc-thee\") is a disease in which the heart muscle grows too thick. The thickened heart muscle can make it hard for the heart to pump blood well. This can cause shortness of breath, chest pain, dizziness, fainting, and fatigue. It also can affect the heart's electrical system. This increases the risk for life-threatening abnormal heartbeats. Your doctor may recommend a device called an ICD (implantable cardioverter-defibrillator) to stop these abnormal heartbeats. Good care at home can help you cope with symptoms and get the best results from your medications. It is very important that you follow up with your doctor. Follow-up care is a key part of your treatment and safety. Be sure to make and go to all appointments, and call your doctor if you are having problems. It's also a good idea to know your test results and keep a list of the medicines you take. How can you care for yourself at home? · Take your medicines exactly as prescribed. Call your doctor if you think you are having a problem with your medicine. You will get more details on the specific medicines your doctor prescribes. · Limit alcohol. Alcohol can make your heart weaker. · Talk to your doctor about what level of exercise and what kinds of activities are safe for you. You may need to avoid too much strenuous activity. · Have your family members tested for hypertrophic cardiomyopathy. The condition runs in families, and regular testing can identify it before it causes symptoms. · Do not smoke.  Smoking can make this condition worse. If you need help quitting, talk to your doctor about stop-smoking programs and medicines. These can increase your chances of quitting for good. When should you call for help? Call 911 anytime you think you may need emergency care. For example, call if:  ? · You have symptoms of a heart attack. These may include:  ¨ Chest pain or pressure, or a strange feeling in the chest.  ¨ Sweating. ¨ Shortness of breath. ¨ Nausea or vomiting. ¨ Pain, pressure, or a strange feeling in the back, neck, jaw, or upper belly or in one or both shoulders or arms. ¨ Lightheadedness or sudden weakness. After you call 911, the  may tell you to chew 1 adult-strength or 2 to 4 low-dose aspirin. Wait for an ambulance. Do not try to drive yourself. ? · You have severe trouble breathing. ? · You cough up pink, foamy mucus and you have trouble breathing. ? · You passed out (lost consciousness). ?Call your doctor now or seek immediate medical care if:  ? · You have new or increased shortness of breath. ? · You are dizzy or lightheaded, or you feel like you may faint. ? · You have sudden weight gain, such as more than 2 to 3 pounds in a day or 5 pounds in a week. (Your doctor may suggest a different range of weight gain.)   ? · You have increased swelling in your legs, ankles, or feet. ? · You have an ICD and you have signs of infection, such as:  ¨ Increased pain, swelling, warmth, or redness. ¨ Red streaks leading from the cut (incision). ¨ Pus draining from the incision. ¨ A fever. ? · You have a sudden episode of a skipping heartbeat or a very fast heartbeat. ? Watch closely for changes in your health, and be sure to contact your doctor if you have any problems. Where can you learn more? Go to http://rachel-velma.info/. Enter Q219 in the search box to learn more about \"Hypertrophic Cardiomyopathy: Care Instructions. \"  Current as of: September 21, 2016  Content Version: 11.4  © 5500-1027 Healthwise[employer]. Care instructions adapted under license by [employer] (which disclaims liability or warranty for this information). If you have questions about a medical condition or this instruction, always ask your healthcare professional. [address] any warranty or liability for your use of this information.

## 2018-05-03 NOTE — PROGRESS NOTES
[facility]   [address] [facility], [address] [place] [address]                                          NEW PATIENT HPI/FOLLOW-UP      NAME:  Eden Nguyen   :   1980   MRN:   [id number]   PCP:  PROVIDER UNKNOWN           Subjective: The patient is a 41y.o. year old female with h/o hypertrophic cardiomyopathy, and HTN who returns for a routine follow-up. Since the last visit, patient reports she was having increased swelling in her feet and ankles. States she didn't have this problem when she was on HCTZ but that we took her off of that. So she went to another doctor who put her back on it. Edema is limited to her feet and ankles, it worst at the end of the day. There is no SOB, SMITH or chest pain. She states that her 43-yo brother  suddenly of a heart attack last week. Beverley Decker was this past weekend. Pt states her brother was standing up holding a conversation with their sister and suddenly collapsed. Pt notes that \"enlarged heart\" runs in their family and brother had been advised by several family members to \"get checked out\". Denies change in exercise tolerance, chest pain, medication intolerance, palpitations, shortness of breath, PND/orthopnea, wheezing, sputum, syncope, dizziness or light headedness. Doing satisfactorily. Review of Systems  General: Pt denies excessive weight gain or loss. Pt is able to conduct ADL's. Respiratory: Denies shortness of breath, SMITH, wheezing or stridor.   Cardiovascular: +edema Denies precordial pain, palpitations, or PND  Gastrointestinal: Denies poor appetite, indigestion, abdominal pain or blood in stool  Peripheral vascular: Denies claudication, leg cramps  Neuropsychiatric: Denies paresthesias,tingling,numbness,anxiety,depression,fatigue  Musculoskeletal: Denies pain,tenderness, soreness,swelling      Past Medical History:   Diagnosis Date    Abscess 2016    CAD (coronary artery disease)     enlarged heart    Delivery normal     Hypertension     Psychiatric disorder     depresssion    Tubal ligation status 12/10/15     Patient Active Problem List    Diagnosis Date Noted    Severe obesity (BMI 35.0-39. 9) with comorbidity ([facility] 05/03/2018    Hypertrophic cardiomyopathy ([facility] 05/03/2018    Chest wall pain 12/08/2016    Essential hypertension 12/08/2016    Mood disorder ([facility] 11/03/2016    Alcohol abuse 11/03/2016    Cannabis abuse 11/03/2016    Abscess 07/11/2016      Past Surgical History:   Procedure Laterality Date    HX GYN      tubal ligation    HX ORTHOPAEDIC      tubal pregnancy removal    HX OTHER SURGICAL      abscesses     Allergies   Allergen Reactions    Other Food Anaphylaxis     Old Bey seasoning    Iodine Itching    Shellfish Containing Products Itching      History reviewed. No pertinent family history. Social History     Social History    Marital status:      Spouse name: N/A    Number of children: N/A    Years of education: N/A     Occupational History    Not on file. Social History Main Topics    Smoking status: Current Some Day Smoker     Packs/day: 0.25    Smokeless tobacco: Never Used    Alcohol use Yes      Comment: socially    Drug use: No    Sexual activity: No     Other Topics Concern    Not on file     Social History Narrative      Current Outpatient Prescriptions   Medication Sig    spironolactone (ALDACTONE) 25 mg tablet Take 1 Tab by mouth daily.  DEBLITANE 0.35 mg tab     metroNIDAZOLE (FLAGYL) 500 mg tablet     FLUoxetine (PROZAC) 20 mg capsule take 1 capsule by mouth once daily for depression and anxiety    divalproex DR (DEPAKOTE) 500 mg tablet take 1 tablet by mouth twice a day for ANGER AND MOOD STABILITY    CHOLECALCIFEROL, VITAMIN D3, (VITAMIN D3 PO) Take  by mouth daily.  labetalol (NORMODYNE) 200 mg tablet Take 1 Tab by mouth two (2) times a day.  Indications: hypertension    cloNIDine HCl (CATAPRES) 0.2 mg tablet Take 1 Tab by mouth two (2) times a day. Indications: hypertension    valACYclovir (VALTREX) 500 mg tablet     valsartan (DIOVAN) 160 mg tablet Take 1 Tab by mouth daily. Indications: hypertension    fluticasone-salmeterol (ADVAIR DISKUS) 100-50 mcg/dose diskus inhaler Take 1 Puff by inhalation every twelve (12) hours.  lidocaine (XYLOCAINE) 5 % ointment Apply  to affected area two (2) times a day.  omega-3 fatty acids-vitamin e (FISH OIL) 1,000 mg cap Take 1 Cap by mouth daily.  aspirin delayed-release 81 mg tablet Take  by mouth daily.  PROAIR HFA 90 mcg/actuation inhaler inhale 2 puffs by mouth every 4 hours    triamcinolone acetonide (KENALOG) 0.1 % ointment apply to affected area twice a day if needed for eczema    amLODIPine (NORVASC) 10 mg tablet take 1 tablet by mouth once daily     No current facility-administered medications for this visit. I have reviewed the nurses notes, vitals, problem list, allergy list, medical history, family medical, social history and medications. Objective:     Physical Exam:     Vitals:    05/03/18 1146 05/03/18 1152   BP: (!) 146/99 (!) 158/97   Pulse: 82 84   Resp: 12    SpO2: 98%    Weight: 192 lb (87.1 kg)    Height: 5' 2\" (1.575 m)     Body mass index is 35.12 kg/(m^2). General: WDWN obese adult woman in no acute distress. Pleasant affect. HEENT: No carotid bruits, no JVD, trach is midline. Heart:  Normal S1/S2 negative S3 or S4. Regular, no murmur, gallop or rub.   Respiratory: Clear bilaterally, no wheezing or rales  Abdomen:   Soft, non-tender, bowel sounds are active.   Extremities:  Diffuse b/l foot edema, normal cap refill, no cyanosis. Neuro: A&Ox3, speech clear, gait stable. Skin: Skin color is normal. No rashes or lesions. No diaphoresis. Vascular: 2+ pulses symmetric in all extremities      Data Review:       Cardiographics:    EKG interpretation:  Rhythm: normal sinus rhythm; and regular . Rate (approx.): 73;  Axis: leftward; P wave: normal; QRS interval: normal ; ST/T wave: anterior elevation and lateral depression; +LVH by voltage criteria. EKG c/w strain pattern and unchanged when compared with EKG on 2/2/18. This EKG was interpreted by Shante Meier PA-C. Cardiology Labs:    Results for orders placed or performed during the hospital encounter of 11/06/16   EKG, 12 LEAD, INITIAL   Result Value Ref Range    Ventricular Rate 84 BPM    Atrial Rate 84 BPM    P-R Interval 156 ms    QRS Duration 82 ms    Q-T Interval 400 ms    QTC Calculation (Bezet) 472 ms    Calculated P Axis 36 degrees    Calculated R Axis 1 degrees    Calculated T Axis 135 degrees    Diagnosis       Normal sinus rhythm  Possible Left atrial enlargement  Left ventricular hypertrophy with repolarization abnormality  Cannot rule out Septal infarct , age undetermined    Confirmed by Nilson Simpson ([id number]) on 11/7/2016 1:54:12 PM         No results found for: CHOL, CHOLX, CHLST, CHOLV, 055361, HDL, LDL, LDLC, DLDLP, TGLX, TRIGL, TRIGP, CHHD, [facility]    Lab Results   Component Value Date/Time    Sodium 138 11/06/2016 06:08 PM    Potassium 3.3 (L) 11/06/2016 06:08 PM    Chloride 105 11/06/2016 06:08 PM    CO2 24 11/06/2016 06:08 PM    Anion gap 9 11/06/2016 06:08 PM    Glucose 98 11/06/2016 06:08 PM    BUN 17 11/06/2016 06:08 PM    Creatinine 0.85 11/06/2016 06:08 PM    BUN/Creatinine ratio 20 11/06/2016 06:08 PM    GFR est AA >60 11/06/2016 06:08 PM    GFR est non-AA >60 11/06/2016 06:08 PM    Calcium 8.2 (L) 11/06/2016 06:08 PM    Bilirubin, total 0.3 11/06/2016 06:08 PM    AST (SGOT) 18 11/06/2016 06:08 PM    Alk. phosphatase 67 11/06/2016 06:08 PM    Protein, total 7.1 11/06/2016 06:08 PM    Albumin 3.4 (L) 11/06/2016 06:08 PM    Globulin 3.7 11/06/2016 06:08 PM    A-G Ratio 0.9 (L) 11/06/2016 06:08 PM    ALT (SGPT) 46 11/06/2016 06:08 PM          Assessment:       ICD-10-CM ICD-9-CM    1.  Essential hypertension I10 401.9 AMB POC EKG ROUTINE W/ 12 LEADS, INTER & REP spironolactone (ALDACTONE) 25 mg tablet   2. Chest wall pain R07.89 786.52    3. Hypertrophic cardiomyopathy (HCC) I42.2 425.18 spironolactone (ALDACTONE) 25 mg tablet      REFERRAL TO CARDIAC ELECTROPHYSIOLOGY   4. FHx: sudden cardiac death (SCD) Z82.41 V17.41 REFERRAL TO CARDIAC ELECTROPHYSIOLOGY         Discussion: Patient presents at this time stable from a cardiac perspective. BP was moderately controlled. Discontinued HCTZ; not appropriate in hypertrophic cardiomyopathy as this activates the RAAS overtime. Pt's EKG changes are consistent with LVH strain unchanged. I am concerned about her brother's recent sudden cardiac death. Pt had an event monitor earlier this year based on symptoms of \"racing heart\", SOB and chest pain. Results showed infrequent sinus tach related to her reported sx. However, Dr. Luiz De Los Santos report also states: \"There is infrequent episodic complex ventricular ectopy with occasional uniform pairs and triplets. The longest run was 5 uniform beats, presenting as a pair then a triplet with a single nonconducted P wave in between. \"    She had a negative myocardial perfusion study in February 2018. After discussing with Dr. Mateo Miller, agree to send to EP for further evaluation of risk of SCD. Plan: 1. Stop HCTZ for Spironolactone 25. Mg every day    2. Encouraged to exercise to tolerance, lose weight, stop smoking and follow low fat, low cholesterol, low sodium predominantly Plant-based (consider Mediterranean) diet. 3.Follow up: 1 month   --  Call with questions or concerns. -- Will follow up any test results by phone and/or f/u here in office if needed. .        I have discussed the diagnosis with the patient and the intended plan as seen in the above orders. The patient has received an after-visit summary and questions were answered concerning future plans. I have discussed any concerning medication side effects and warnings with the patient as well.     Shante Meier DAVID  5/3/2018

## 2018-05-03 NOTE — PROGRESS NOTES
Chief Complaint   Patient presents with    Chest Pain (Angina)     pt c/o headache, swelling feet bilateral.     1. Have you been to the ER, urgent care clinic since your last visit? Hospitalized since your last visit? No    2. Have you seen or consulted any other health care providers outside of the [address] [place] [address] since your last visit? Include any pap smears or colon screening.  No

## 2018-05-03 NOTE — MR AVS SNAPSHOT
[facility] [place], [facility] 
 
 
 [address] 
[phone] Patient: Araceli Vilchis 
MRN: [id number] JKD:[birth year] Visit Information Date & Time Provider Department Dept. Phone Encounter #  
 5/3/2018 11:20 AM MD [facility] [id number] Your Appointments 6/4/2018  2:20 PM  
ESTABLISHED PATIENT with MD Neelam [facility]) Appt Note: 1 MO. F/U  
 [address]  
[phone] [address] Upcoming Health Maintenance Date Due Pneumococcal 19-64 Medium Risk (1 of 1 - PPSV23) 2/26/2000 PAP AKA CERVICAL CYTOLOGY 2/26/2002 MEDICARE YEARLY EXAM 3/28/2018 Influenza Age 5 to Adult 8/1/2018 DTaP/Tdap/Td series (2 - Td) 12/10/2025 Allergies as of 5/3/2018  Review Complete On: 2/2/2018 By: Maddy Spencer PA-C Severity Noted Reaction Type Reactions Other Food High 06/16/2017    Anaphylaxis Old Bey seasoning Iodine  07/13/2010    Itching Shellfish Containing Products  07/13/2010    Itching Current Immunizations  Never Reviewed Name Date  
 TD Vaccine 7/27/2011  1:09 AM  
 Tdap 12/10/2015  2:53 PM  
  
 Not reviewed this visit You Were Diagnosed With   
  
 Codes Comments Essential hypertension    -  Primary ICD-10-CM: I10 
ICD-9-CM: 401.9 Chest wall pain     ICD-10-CM: R07.89 ICD-9-CM: 786.52 Hypertrophic cardiomyopathy (HCC)     ICD-10-CM: I42.2 ICD-9-CM: 425.18 FHx: sudden cardiac death (SCD)     ICD-10-CM: Z82.41 
ICD-9-CM: V17.41 Vitals BP Pulse Resp Height(growth percentile) Weight(growth percentile) SpO2  
 (!) 158/97 (BP 1 Location: Right arm, BP Patient Position: Sitting) 84 12 5' 2\" (1.575 m) 192 lb (87.1 kg) 98% BMI OB Status Smoking Status 35.12 kg/m2 Having regular periods Current Some Day Smoker Vitals History BMI and BSA Data Body Mass Index Body Surface Area  
 35.12 kg/m 2 1.95 m 2 Preferred Pharmacy Pharmacy Name Phone [facility] [address] [phone] Your Updated Medication List  
  
   
This list is accurate as of 5/3/18 12:50 PM.  Always use your most recent med list.  
  
  
  
  
 ADVAIR DISKUS 100-50 mcg/dose diskus inhaler Generic drug:  fluticasone-salmeterol Take 1 Puff by inhalation every twelve (12) hours. amLODIPine 10 mg tablet Commonly known as:  NORVASC  
take 1 tablet by mouth once daily  
  
 aspirin delayed-release 81 mg tablet Take  by mouth daily. cloNIDine HCl 0.2 mg tablet Commonly known as:  CATAPRES Take 1 Tab by mouth two (2) times a day. Indications: hypertension DEBLITANE 0.35 mg Tab Generic drug:  norethindrone  
  
 divalproex  mg tablet Commonly known as:  DEPAKOTE  
take 1 tablet by mouth twice a day for ANGER AND MOOD STABILITY FISH OIL 1,000 mg Cap Generic drug:  omega-3 fatty acids-vitamin e Take 1 Cap by mouth daily. FLUoxetine 20 mg capsule Commonly known as:  PROzac  
take 1 capsule by mouth once daily for depression and anxiety  
  
 labetalol 200 mg tablet Commonly known as:  Aida Karitk Take 1 Tab by mouth two (2) times a day. Indications: hypertension  
  
 lidocaine 5 % ointment Commonly known as:  XYLOCAINE Apply  to affected area two (2) times a day. metroNIDAZOLE 500 mg tablet Commonly known as:  FLAGYL  
  
 PROAIR HFA 90 mcg/actuation inhaler Generic drug:  albuterol  
inhale 2 puffs by mouth every 4 hours  
  
 spironolactone 25 mg tablet Commonly known as:  ALDACTONE Take 1 Tab by mouth daily. triamcinolone acetonide 0.1 % ointment Commonly known as:  KENALOG apply to affected area twice a day if needed for eczema  
  
 valACYclovir 500 mg tablet Commonly known as:  VALTREX  
  
 valsartan 160 mg tablet Commonly known as:  DIOVAN Take 1 Tab by mouth daily. Indications: hypertension VITAMIN D3 PO Take  by mouth daily. Prescriptions Sent to Pharmacy Refills  
 spironolactone (ALDACTONE) 25 mg tablet 6 Sig: Take 1 Tab by mouth daily. Class: Normal  
 [facility]  #: [phone] Route: Oral  
  
We Performed the Following AMB POC EKG ROUTINE W/ 12 LEADS, INTER & REP [30496 CPT(R)] REFERRAL TO CARDIAC ELECTROPHYSIOLOGY [REF11 Custom] Comments:  
 Please evaluate patient for life-threatening arrhythmia related to hypertrophic cardiomyopathy and recent sudden cardiac death of brother aged 44. Referral Information Referral ID Referred By Referred To  
  
 [id number] [facility] Alex Moffett MD   
   [place] [address] [address] Phone: [phone] Fax: [phone] Visits Status Start Date End Date 1 New Request 5/3/18 5/3/19 If your referral has a status of pending review or denied, additional information will be sent to support the outcome of this decision. Patient Instructions -- PLEASE DON'T TAKE THE HYDROCHLORITHIAZDE! THIS IS NOT SAFE FOR YOUR ENLARGED HEART MUSCLE 
 
-- I've sent you a new prescription for fluid that will be safe for your heart 
 
-- If headaches don't improve please see your primary care provider about this -- Let's see you in 1 month Hypertrophic Cardiomyopathy: Care Instructions Your Care Instructions Hypertrophic cardiomyopathy (say \"hy-per-TROH-fik xhz-nrd-am-gu-UZW-wj-thee\") is a disease in which the heart muscle grows too thick.  The thickened heart muscle can make it hard for the heart to pump blood well. This can cause shortness of breath, chest pain, dizziness, fainting, and fatigue. It also can affect the heart's electrical system. This increases the risk for life-threatening abnormal heartbeats. Your doctor may recommend a device called an ICD (implantable cardioverter-defibrillator) to stop these abnormal heartbeats. Good care at home can help you cope with symptoms and get the best results from your medications. It is very important that you follow up with your doctor. Follow-up care is a key part of your treatment and safety. Be sure to make and go to all appointments, and call your doctor if you are having problems. It's also a good idea to know your test results and keep a list of the medicines you take. How can you care for yourself at home? · Take your medicines exactly as prescribed. Call your doctor if you think you are having a problem with your medicine. You will get more details on the specific medicines your doctor prescribes. · Limit alcohol. Alcohol can make your heart weaker. · Talk to your doctor about what level of exercise and what kinds of activities are safe for you. You may need to avoid too much strenuous activity. · Have your family members tested for hypertrophic cardiomyopathy. The condition runs in families, and regular testing can identify it before it causes symptoms. · Do not smoke. Smoking can make this condition worse. If you need help quitting, talk to your doctor about stop-smoking programs and medicines. These can increase your chances of quitting for good. When should you call for help? Call 911 anytime you think you may need emergency care. For example, call if: 
? · You have symptoms of a heart attack. These may include: ¨ Chest pain or pressure, or a strange feeling in the chest. 
¨ Sweating. ¨ Shortness of breath. ¨ Nausea or vomiting.  
¨ Pain, pressure, or a strange feeling in the back, neck, jaw, or upper belly or in one or both shoulders or arms. ¨ Lightheadedness or sudden weakness. After you call 911, the  may tell you to chew 1 adult-strength or 2 to 4 low-dose aspirin. Wait for an ambulance. Do not try to drive yourself. ? · You have severe trouble breathing. ? · You cough up pink, foamy mucus and you have trouble breathing. ? · You passed out (lost consciousness). ?Call your doctor now or seek immediate medical care if: 
? · You have new or increased shortness of breath. ? · You are dizzy or lightheaded, or you feel like you may faint. ? · You have sudden weight gain, such as more than 2 to 3 pounds in a day or 5 pounds in a week. (Your doctor may suggest a different range of weight gain.) ? · You have increased swelling in your legs, ankles, or feet. ? · You have an ICD and you have signs of infection, such as: 
¨ Increased pain, swelling, warmth, or redness. ¨ Red streaks leading from the cut (incision). ¨ Pus draining from the incision. ¨ A fever. ? · You have a sudden episode of a skipping heartbeat or a very fast heartbeat. ? Watch closely for changes in your health, and be sure to contact your doctor if you have any problems. Where can you learn more? Go to http://rachel-velma.info/. Enter Q219 in the search box to learn more about \"Hypertrophic Cardiomyopathy: Care Instructions. \" Current as of: September 21, 2016 Content Version: 11.4 © 6513-8915 [employer]. Care instructions adapted under license by [employer] (which disclaims liability or warranty for this information). If you have questions about a medical condition or this instruction, always ask your healthcare professional. [place] [address] any warranty or liability for your use of this information. Introducing [facility]!    
 Chantal Padilla introduces [employer] patient portal. Now you can access parts of your medical record, email your doctor's office, and request medication refills online. 1. In your internet browser, go to [email or web address] 2. Click on the First Time User? Click Here link in the Sign In box. You will see the New Member Sign Up page. 3. Enter your [employer] Access Code exactly as it appears below. You will not need to use this code after youve completed the sign-up process. If you do not sign up before the expiration date, you must request a new code. · [employer] Access Code: [email or web address] Expires: 8/1/2018 12:34 PM 
 
4. Enter the last four digits of your Social Security Number (xxxx) and Date of Birth (mm/dd/yyyy) as indicated and click Submit. You will be taken to the next sign-up page. 5. Create a [employer] ID. This will be your [employer] login ID and cannot be changed, so think of one that is secure and easy to remember. 6. Create a [employer] password. You can change your password at any time. 7. Enter your Password Reset Question and Answer. This can be used at a later time if you forget your password. 8. Enter your e-mail address. You will receive e-mail notification when new information is available in [address] 9. Click Sign Up. You can now view and download portions of your medical record. 10. Click the Download Summary menu link to download a portable copy of your medical information. If you have questions, please visit the Frequently Asked Questions section of the [employer] website. Remember, [employer] is NOT to be used for urgent needs. For medical emergencies, dial 911. Now available from your iPhone and Android! Please provide this summary of care documentation to your next provider. Your primary care clinician is listed as PROVIDER UNKNOWN. If you have any questions after today's visit, please call [phone].

## 2018-05-05 ENCOUNTER — DOCUMENTATION ONLY (OUTPATIENT)
Dept: CARDIOLOGY CLINIC | Age: 38
End: 2018-05-05

## 2018-05-05 NOTE — PROGRESS NOTES
Per Dr Felton Hirsch, patient's brother  suddenly and he has LVH on ECG and echo  NSVT on event monitor  Please move appt up from  and arrange for cardiac MRI at [id number] or [id number] [address] [facility]

## 2018-05-05 NOTE — LETTER
5/15/2018 9:12 AM 
 
Ms. Luis Simms [address] [phone] You have been referred to our cardiology practice following a recent visit to Dr Oscar Wong. You have a pending appointment with Dr Nida Ennis on 6/5/18 at  1:40pm.  We have tried to contact you regarding moving that appointment up to a sooner date. If you would like to move this appointment up please contact our office at [phone].    
 
 
 
 
 
 
 
 
Sincerely, 
 
 
Iraida Alfred MD

## 2018-05-17 ENCOUNTER — TELEPHONE (OUTPATIENT)
Dept: CARDIOLOGY CLINIC | Age: 38
End: 2018-05-17

## 2018-05-17 NOTE — TELEPHONE ENCOUNTER
Spoke with pt . Verified 2 identifers. Pt requesting conditon name of her heart Told pt on her problem list it states : hypertrophic cardiomyopathy asked her is that the name she recalls provider told her she stated yes that's the one. Patient verbalize understanding .

## 2018-06-05 ENCOUNTER — TELEPHONE (OUTPATIENT)
Dept: CARDIOLOGY CLINIC | Age: 38
End: 2018-06-05

## 2018-06-05 NOTE — LETTER
6/25/2018 8:30 AM 
 
Ms. Luis Simms [address] [phone] You have been referred to our cardiology practice following a recent visit to Dr Gabriele Chavira office for family history of sudden cardiac death. We missed you at your scheduled appointment and have made several attempts to contact you to reschedule. If you would like to make an appointment for your continued care, please contact our office at [phone].  
 
 
 
 
 
 
 
 
 
Sincerely, 
 
 
Mayur Sam MD

## 2018-06-05 NOTE — TELEPHONE ENCOUNTER
Attempted to reached pt via phone and LVM on 6/5/18 requesting a return call to re-schedule missed New Patient appt for Hx: sudden cardiac death (SCD) per Dr Zaire Nunez.   Thanks

## 2018-06-05 NOTE — TELEPHONE ENCOUNTER
----- Message from Pamela Okeefe MD sent at 6/5/2018  2:33 PM EDT -----  Missed appt  Need to be seen due to hypertrophic cardiomyopathy

## 2018-06-05 NOTE — TELEPHONE ENCOUNTER
Attempted to reached pt via phone and LVM requesting a return call to re-schedule missed New Patient appt for Hx: sudden cardiac death (SCD) per  [facility].   Thanks

## 2018-07-09 DIAGNOSIS — R07.9 CHEST PAIN, UNSPECIFIED TYPE: ICD-10-CM

## 2018-07-09 DIAGNOSIS — R06.02 SOB (SHORTNESS OF BREATH): ICD-10-CM

## 2018-07-11 ENCOUNTER — OFFICE VISIT (OUTPATIENT)
Dept: CARDIOLOGY CLINIC | Age: 38
End: 2018-07-11

## 2018-07-11 VITALS
HEIGHT: 62 IN | HEART RATE: 86 BPM | BODY MASS INDEX: 34.52 KG/M2 | RESPIRATION RATE: 16 BRPM | DIASTOLIC BLOOD PRESSURE: 110 MMHG | SYSTOLIC BLOOD PRESSURE: 188 MMHG | WEIGHT: 187.6 LBS | OXYGEN SATURATION: 98 %

## 2018-07-11 DIAGNOSIS — E66.01 SEVERE OBESITY (BMI 35.0-39.9) WITH COMORBIDITY (HCC): ICD-10-CM

## 2018-07-11 DIAGNOSIS — I42.2 HYPERTROPHIC CARDIOMYOPATHY (HCC): ICD-10-CM

## 2018-07-11 DIAGNOSIS — I10 ESSENTIAL HYPERTENSION: Primary | ICD-10-CM

## 2018-07-11 RX ORDER — VALSARTAN 160 MG/1
160 TABLET ORAL DAILY
Qty: 30 TAB | Refills: 11 | Status: SHIPPED | OUTPATIENT
Start: 2018-07-11 | End: 2018-07-17 | Stop reason: ALTCHOICE

## 2018-07-11 RX ORDER — CLONIDINE HYDROCHLORIDE 0.2 MG/1
0.2 TABLET ORAL 2 TIMES DAILY
Qty: 60 TAB | Refills: 6 | Status: SHIPPED | OUTPATIENT
Start: 2018-07-11 | End: 2018-09-21 | Stop reason: ALTCHOICE

## 2018-07-11 NOTE — PROGRESS NOTES
[facility]   [address] [facility], [address] [place] [address] Road                                          NEW PATIENT HPI/FOLLOW-UP      NAME:  Chad Quevedo   :   1980   MRN:   [id number]   PCP:  Shellie Stevens MD           Subjective: The patient is a 41y.o. year old female with h/o hypertrophic cardiomyopathy, HTN, severe obesity and significant behavioral health hx who returns for a routine follow-up. Since the last visit, patient reports a few episodes of sharp, right sided chest pain that occurred several weeks ago. She notes they last about 5 mins and self resolve. She notes they are more frequent when her BP is not controlled/when she is not taking her BP meds. She admits to not taking her BP meds today. States: \"I need to pick them up from the pharmacy on my way home. \" Has apparently been out of town and off her meds for a few days. Needs refills on Clonidine and Diovan. Denies change in exercise tolerance, edema, medication intolerance, palpitations, shortness of breath, PND/orthopnea, wheezing, sputum, syncope, dizziness or light headedness. Doing satisfactorily. Review of Systems  General: Pt denies excessive weight gain or loss. Pt is able to conduct ADL's. Respiratory: Denies shortness of breath, SMITH, wheezing or stridor.   Cardiovascular: Denies precordial pain, palpitations, edema or PND  Gastrointestinal: Denies poor appetite, indigestion, abdominal pain or blood in stool  Peripheral vascular: Denies claudication, leg cramps  Neuropsychiatric: Denies paresthesias,tingling,numbness,anxiety,depression,fatigue  Musculoskeletal: +chest wall pain, Denies tenderness, soreness,swelling      Past Medical History:   Diagnosis Date    Abscess 2016    CAD (coronary artery disease)     enlarged heart    Delivery normal     Hypertension     Psychiatric disorder     depresssion    Tubal ligation status 12/10/15     Patient Active Problem List    Diagnosis Date Noted    Severe obesity (BMI 35.0-39. 9) with comorbidity ([facility] 05/03/2018    Hypertrophic cardiomyopathy ([facility] 05/03/2018    Chest wall pain 12/08/2016    Essential hypertension 12/08/2016    Mood disorder ([facility] 11/03/2016    Alcohol abuse 11/03/2016    Cannabis abuse 11/03/2016    Abscess 07/11/2016      Past Surgical History:   Procedure Laterality Date    HX GYN      tubal ligation    HX ORTHOPAEDIC      tubal pregnancy removal    HX OTHER SURGICAL      abscesses     Allergies   Allergen Reactions    Other Food Anaphylaxis     Old Bey seasoning    Iodine Itching    Shellfish Containing Products Itching      History reviewed. No pertinent family history. Social History     Social History    Marital status:      Spouse name: N/A    Number of children: N/A    Years of education: N/A     Occupational History    Not on file. Social History Main Topics    Smoking status: Current Some Day Smoker     Packs/day: 0.25    Smokeless tobacco: Never Used    Alcohol use Yes      Comment: socially    Drug use: No    Sexual activity: No     Other Topics Concern    Not on file     Social History Narrative      Current Outpatient Prescriptions   Medication Sig    valsartan (DIOVAN) 160 mg tablet Take 1 Tab by mouth daily. Indications: hypertension    cloNIDine HCl (CATAPRES) 0.2 mg tablet Take 1 Tab by mouth two (2) times a day. Indications: hypertension    spironolactone (ALDACTONE) 25 mg tablet Take 1 Tab by mouth daily.  amLODIPine (NORVASC) 10 mg tablet take 1 tablet by mouth once daily    DEBLITANE 0.35 mg tab     metroNIDAZOLE (FLAGYL) 500 mg tablet     FLUoxetine (PROZAC) 20 mg capsule take 1 capsule by mouth once daily for depression and anxiety    divalproex DR (DEPAKOTE) 500 mg tablet take 1 tablet by mouth twice a day for ANGER AND MOOD STABILITY    CHOLECALCIFEROL, VITAMIN D3, (VITAMIN D3 PO) Take  by mouth daily.     labetalol (NORMODYNE) 200 mg tablet Take 1 Tab by mouth two (2) times a day. Indications: hypertension    valACYclovir (VALTREX) 500 mg tablet     fluticasone-salmeterol (ADVAIR DISKUS) 100-50 mcg/dose diskus inhaler Take 1 Puff by inhalation every twelve (12) hours.  lidocaine (XYLOCAINE) 5 % ointment Apply  to affected area two (2) times a day.  omega-3 fatty acids-vitamin e (FISH OIL) 1,000 mg cap Take 1 Cap by mouth daily.  aspirin delayed-release 81 mg tablet Take  by mouth daily.  PROAIR HFA 90 mcg/actuation inhaler inhale 2 puffs by mouth every 4 hours    triamcinolone acetonide (KENALOG) 0.1 % ointment apply to affected area twice a day if needed for eczema     No current facility-administered medications for this visit. I have reviewed the nurses notes, vitals, problem list, allergy list, medical history, family medical, social history and medications. Objective:     Physical Exam:     Vitals:    07/11/18 1529 07/11/18 1540 07/11/18 1546   BP: (!) 198/118 (!) 198/118 (!) 188/110   Pulse: 86     Resp: 16     SpO2: 98%     Weight: 187 lb 9.6 oz (85.1 kg)     Height: 5' 2\" (1.575 m)      Body mass index is 34.31 kg/(m^2). General: WDWN obese adult woman, in no acute distress. Pleasant affect. HEENT: No carotid bruits, no JVD, trach is midline. Heart:  Normal S1/S2 negative S3 or S4. Regular, no murmur, gallop or rub.   Respiratory: Clear bilaterally, no wheezing or rales  Abdomen:   Soft, non-tender, bowel sounds are active.   Extremities:  No edema, normal cap refill, no cyanosis. Neuro: A&Ox3, speech clear, gait stable. Skin: Skin color is normal. No rashes or lesions. No diaphoresis.   Vascular: 2+ pulses symmetric in all extremities      Data Review:       Cardiographics:    EKG interpretation:  None today    Cardiology Labs:    Results for orders placed or performed during the hospital encounter of 11/06/16   EKG, 12 LEAD, INITIAL   Result Value Ref Range    Ventricular Rate 84 BPM    Atrial Rate 84 BPM    P-R Interval 156 ms    QRS Duration 82 ms    Q-T Interval 400 ms    QTC Calculation (Bezet) 472 ms    Calculated P Axis 36 degrees    Calculated R Axis 1 degrees    Calculated T Axis 135 degrees    Diagnosis       Normal sinus rhythm  Possible Left atrial enlargement  Left ventricular hypertrophy with repolarization abnormality  Cannot rule out Septal infarct , age undetermined    Confirmed by Oly Leon ([id number]) on 11/7/2016 1:54:12 PM         No results found for: CHOL, CHOLX, CHLST, CHOLV, [id number], HDL, LDL, LDLC, DLDLP, [place], [facility], [facility]    Lab Results   Component Value Date/Time    Sodium 138 11/06/2016 06:08 PM    Potassium 3.3 (L) 11/06/2016 06:08 PM    Chloride 105 11/06/2016 06:08 PM    CO2 24 11/06/2016 06:08 PM    Anion gap 9 11/06/2016 06:08 PM    Glucose 98 11/06/2016 06:08 PM    BUN 17 11/06/2016 06:08 PM    Creatinine 0.85 11/06/2016 06:08 PM    BUN/Creatinine ratio 20 11/06/2016 06:08 PM    GFR est AA >60 11/06/2016 06:08 PM    GFR est non-AA >60 11/06/2016 06:08 PM    Calcium 8.2 (L) 11/06/2016 06:08 PM    Bilirubin, total 0.3 11/06/2016 06:08 PM    AST (SGOT) 18 11/06/2016 06:08 PM    Alk. phosphatase 67 11/06/2016 06:08 PM    Protein, total 7.1 11/06/2016 06:08 PM    Albumin 3.4 (L) 11/06/2016 06:08 PM    Globulin 3.7 11/06/2016 06:08 PM    A-G Ratio 0.9 (L) 11/06/2016 06:08 PM    ALT (SGPT) 46 11/06/2016 06:08 PM          Assessment:       ICD-10-CM ICD-9-CM    1. Essential hypertension I10 401.9 valsartan (DIOVAN) 160 mg tablet      cloNIDine HCl (CATAPRES) 0.2 mg tablet      CANCELED: AMB POC EKG ROUTINE W/ 12 LEADS, INTER & REP   2. Hypertrophic cardiomyopathy (HCC) I42.2 425.18    3. Severe obesity (BMI 35.0-39. 9) with comorbidity ([address] E66.01 278.01          Discussion: Patient presents at this time hypertensive. BP was not controlled. Pt has been without her medications for a few days. She has not followed up with Dr. Avery Nunn as previously planned.  Pt's non-compliance is the biggest zafar to her health, despite continued warning and recent sudden cardiac death of her brother. Discussed/seen with Dr. Joey Dorsey: 1. Continue same meds. -- Refills sent of Diovan and Clonidine   -- Pt encouraged to  ALL meds and take them regularly   -- Pt again encouraged to make and keep f/u with Dr. Radha Recinos     2. Pt has identified a new PCP for asthma mgmt etc. She plans to make an appt with him this month. Advised her that I would prefer she got her asthma inhalers from him, as we are not primary managers of that condition. 3.Encouraged to exercise to tolerance, lose weight, and follow DASH diet. 4.Follow up: 3 months   --  Call with questions or concerns. I have discussed the diagnosis with the patient and the intended plan as seen in the above orders. The patient has received an after-visit summary and questions were answered concerning future plans. I have discussed any concerning medication side effects and warnings with the patient as well.     Leydi Bradley PA-C  7/11/2018

## 2018-07-11 NOTE — PROGRESS NOTES
Chief Complaint   Patient presents with    Hypertension     1 month follow up, C/O off/on sharp chest pain and SOB at times. Did not take BP medication today     1. Have you been to the ER, urgent care clinic since your last visit? Hospitalized since your last visit? No    2. Have you seen or consulted any other health care providers outside of the [address] [place] [address] since your last visit? Include any pap smears or colon screening.  No

## 2018-07-11 NOTE — PATIENT INSTRUCTIONS
-- Please continue to take ALL your blood pressure medications  -- Please make a follow up with Dr. Elsa Linda:    [address] [facility], [address]  Phone: [phone]      -- We'd like to see you back in 3 months      DASH Diet: Care Instructions  Your Care Instructions    The DASH diet is an eating plan that can help lower your blood pressure. DASH stands for Dietary Approaches to Stop Hypertension. Hypertension is high blood pressure. The DASH diet focuses on eating foods that are high in calcium, potassium, and magnesium. These nutrients can lower blood pressure. The foods that are highest in these nutrients are fruits, vegetables, low-fat dairy products, nuts, seeds, and legumes. But taking calcium, potassium, and magnesium supplements instead of eating foods that are high in those nutrients does not have the same effect. The DASH diet also includes whole grains, fish, and poultry. The DASH diet is one of several lifestyle changes your doctor may recommend to lower your high blood pressure. Your doctor may also want you to decrease the amount of sodium in your diet. Lowering sodium while following the DASH diet can lower blood pressure even further than just the DASH diet alone. Follow-up care is a key part of your treatment and safety. Be sure to make and go to all appointments, and call your doctor if you are having problems. It's also a good idea to know your test results and keep a list of the medicines you take. How can you care for yourself at home? Following the DASH diet  · Eat 4 to 5 servings of fruit each day. A serving is 1 medium-sized piece of fruit, ½ cup chopped or canned fruit, 1/4 cup dried fruit, or 4 ounces (½ cup) of fruit juice. Choose fruit more often than fruit juice. · Eat 4 to 5 servings of vegetables each day. A serving is 1 cup of lettuce or raw leafy vegetables, ½ cup of chopped or cooked vegetables, or 4 ounces (½ cup) of vegetable juice.  Choose vegetables more often than vegetable juice. · Get 2 to 3 servings of low-fat and fat-free dairy each day. A serving is 8 ounces of milk, 1 cup of yogurt, or 1 ½ ounces of cheese. · Eat 6 to 8 servings of grains each day. A serving is 1 slice of bread, 1 ounce of dry cereal, or ½ cup of cooked rice, pasta, or cooked cereal. Try to choose whole-grain products as much as possible. · Limit lean meat, poultry, and fish to 2 servings each day. A serving is 3 ounces, about the size of a deck of cards. · Eat 4 to 5 servings of nuts, seeds, and legumes (cooked dried beans, lentils, and split peas) each week. A serving is 1/3 cup of nuts, 2 tablespoons of seeds, or ½ cup of cooked beans or peas. · Limit fats and oils to 2 to 3 servings each day. A serving is 1 teaspoon of vegetable oil or 2 tablespoons of salad dressing. · Limit sweets and added sugars to 5 servings or less a week. A serving is 1 tablespoon jelly or jam, ½ cup sorbet, or 1 cup of lemonade. · Eat less than 2,300 milligrams (mg) of sodium a day. If you limit your sodium to 1,500 mg a day, you can lower your blood pressure even more. Tips for success  · Start small. Do not try to make dramatic changes to your diet all at once. You might feel that you are missing out on your favorite foods and then be more likely to not follow the plan. Make small changes, and stick with them. Once those changes become habit, add a few more changes. · Try some of the following:  ¨ Make it a goal to eat a fruit or vegetable at every meal and at snacks. This will make it easy to get the recommended amount of fruits and vegetables each day. ¨ Try yogurt topped with fruit and nuts for a snack or healthy dessert. ¨ Add lettuce, tomato, cucumber, and onion to sandwiches. ¨ Combine a ready-made pizza crust with low-fat mozzarella cheese and lots of vegetable toppings. Try using tomatoes, squash, spinach, broccoli, carrots, cauliflower, and onions.   ¨ Have a variety of cut-up vegetables with a low-fat dip as an appetizer instead of chips and dip. ¨ Sprinkle sunflower seeds or chopped almonds over salads. Or try adding chopped walnuts or almonds to cooked vegetables. ¨ Try some vegetarian meals using beans and peas. Add garbanzo or kidney beans to salads. Make burritos and tacos with mashed salas beans or black beans. Where can you learn more? Go to http://rachel-velma.info/. Enter D866 in the search box to learn more about \"DASH Diet: Care Instructions. \"  Current as of: December 6, 2017  Content Version: 11.7  © 5063-5320 [employer]. Care instructions adapted under license by [employer] (which disclaims liability or warranty for this information). If you have questions about a medical condition or this instruction, always ask your healthcare professional. [address] any warranty or liability for your use of this information.

## 2018-07-11 NOTE — MR AVS SNAPSHOT
[facility] 
 
 
 [address] Dwight Gonzalez 13 
[phone] Patient: Abiola Mcneill 
MRN: [id number] Visit Information Date & Time Provider Department Dept. Phone Encounter #  
 7/11/2018  3:00 PM MD Neelam [facility] [place] [address] [id number] Your Appointments 10/12/2018 11:40 AM  
ESTABLISHED PATIENT with MD Neelam Nelson [facility]) Appt Note: 3 MO. F/U  
 [address] Dwight [place] 13  
[phone] [address] Upcoming Health Maintenance Date Due Pneumococcal 19-64 Medium Risk (1 of 1 - PPSV23) 2/26/2000 PAP AKA CERVICAL CYTOLOGY 2/26/2002 Influenza Age 5 to Adult 8/1/2018 DTaP/Tdap/Td series (2 - Td) 12/10/2025 Allergies as of 7/11/2018  Review Complete On: 7/11/2018 By: Bel Aviles LPN Severity Noted Reaction Type Reactions Other Food High 06/16/2017    Anaphylaxis Old Bey seasoning Iodine  07/13/2010    Itching Shellfish Containing Products  07/13/2010    Itching Current Immunizations  Never Reviewed Name Date  
 TD Vaccine 7/27/2011  1:09 AM  
 Tdap 12/10/2015  2:53 PM  
  
 Not reviewed this visit You Were Diagnosed With   
  
 Codes Comments Essential hypertension    -  Primary ICD-10-CM: I10 
ICD-9-CM: 401.9 Vitals BP Pulse Resp Height(growth percentile) Weight(growth percentile) LMP  
 (!) 188/110 (BP 1 Location: Left arm, BP Patient Position: Sitting) 86 16 5' 2\" (1.575 m) 187 lb 9.6 oz (85.1 kg) 07/05/2018 SpO2 BMI OB Status Smoking Status 98% 34.31 kg/m2 Having regular periods Current Some Day Smoker Vitals History BMI and BSA Data Body Mass Index Body Surface Area  
 34.31 kg/m 2 1.93 m 2 Preferred Pharmacy Pharmacy Name Phone [facility] [address] [place] [phone] Your Updated Medication List  
  
   
This list is accurate as of 7/11/18  4:14 PM.  Always use your most recent med list.  
  
  
  
  
 ADVAIR DISKUS 100-50 mcg/dose diskus inhaler Generic drug:  fluticasone-salmeterol Take 1 Puff by inhalation every twelve (12) hours. amLODIPine 10 mg tablet Commonly known as:  NORVASC  
take 1 tablet by mouth once daily  
  
 aspirin delayed-release 81 mg tablet Take  by mouth daily. cloNIDine HCl 0.2 mg tablet Commonly known as:  CATAPRES Take 1 Tab by mouth two (2) times a day. Indications: hypertension DEBLITANE 0.35 mg Tab Generic drug:  norethindrone  
  
 divalproex  mg tablet Commonly known as:  DEPAKOTE  
take 1 tablet by mouth twice a day for ANGER AND MOOD STABILITY FISH OIL 1,000 mg Cap Generic drug:  omega-3 fatty acids-vitamin e Take 1 Cap by mouth daily. FLUoxetine 20 mg capsule Commonly known as:  PROzac  
take 1 capsule by mouth once daily for depression and anxiety  
  
 labetalol 200 mg tablet Commonly known as:  Barbara Locket Take 1 Tab by mouth two (2) times a day. Indications: hypertension  
  
 lidocaine 5 % ointment Commonly known as:  XYLOCAINE Apply  to affected area two (2) times a day. metroNIDAZOLE 500 mg tablet Commonly known as:  FLAGYL  
  
 PROAIR HFA 90 mcg/actuation inhaler Generic drug:  albuterol  
inhale 2 puffs by mouth every 4 hours  
  
 spironolactone 25 mg tablet Commonly known as:  ALDACTONE Take 1 Tab by mouth daily. triamcinolone acetonide 0.1 % ointment Commonly known as:  KENALOG  
apply to affected area twice a day if needed for eczema  
  
 valACYclovir 500 mg tablet Commonly known as:  VALTREX  
  
 valsartan 160 mg tablet Commonly known as:  DIOVAN Take 1 Tab by mouth daily. Indications: hypertension  VITAMIN D3 PO  
 Take  by mouth daily. Prescriptions Sent to Pharmacy Refills  
 valsartan (DIOVAN) 160 mg tablet 11 Sig: Take 1 Tab by mouth daily. Indications: hypertension Class: Normal  
 Pharmacy: [address] Ph #: [phone] Route: Oral  
 cloNIDine HCl (CATAPRES) 0.2 mg tablet 6 Sig: Take 1 Tab by mouth two (2) times a day. Indications: hypertension Class: Normal  
 Pharmacy: [address] Ph #: [phone] Route: Oral  
  
Patient Instructions -- Please continue to take ALL your blood pressure medications -- Please make a follow up with Dr. Carmen Seat: 
 
[facility] [address] [place], [address] Phone: [phone] 
 
 
-- We'd like to see you back in 3 months DASH Diet: Care Instructions Your Care Instructions The DASH diet is an eating plan that can help lower your blood pressure. DASH stands for Dietary Approaches to Stop Hypertension. Hypertension is high blood pressure. The DASH diet focuses on eating foods that are high in calcium, potassium, and magnesium. These nutrients can lower blood pressure. The foods that are highest in these nutrients are fruits, vegetables, low-fat dairy products, nuts, seeds, and legumes. But taking calcium, potassium, and magnesium supplements instead of eating foods that are high in those nutrients does not have the same effect. The DASH diet also includes whole grains, fish, and poultry. The DASH diet is one of several lifestyle changes your doctor may recommend to lower your high blood pressure. Your doctor may also want you to decrease the amount of sodium in your diet. Lowering sodium while following the DASH diet can lower blood pressure even further than just the DASH diet alone. Follow-up care is a key part of your treatment and safety.  Be sure to make and go to all appointments, and call your doctor if you are having problems. It's also a good idea to know your test results and keep a list of the medicines you take. How can you care for yourself at home? Following the DASH diet · Eat 4 to 5 servings of fruit each day. A serving is 1 medium-sized piece of fruit, ½ cup chopped or canned fruit, 1/4 cup dried fruit, or 4 ounces (½ cup) of fruit juice. Choose fruit more often than fruit juice. · Eat 4 to 5 servings of vegetables each day. A serving is 1 cup of lettuce or raw leafy vegetables, ½ cup of chopped or cooked vegetables, or 4 ounces (½ cup) of vegetable juice. Choose vegetables more often than vegetable juice. · Get 2 to 3 servings of low-fat and fat-free dairy each day. A serving is 8 ounces of milk, 1 cup of yogurt, or 1 ½ ounces of cheese. · Eat 6 to 8 servings of grains each day. A serving is 1 slice of bread, 1 ounce of dry cereal, or ½ cup of cooked rice, pasta, or cooked cereal. Try to choose whole-grain products as much as possible. · Limit lean meat, poultry, and fish to 2 servings each day. A serving is 3 ounces, about the size of a deck of cards. · Eat 4 to 5 servings of nuts, seeds, and legumes (cooked dried beans, lentils, and split peas) each week. A serving is 1/3 cup of nuts, 2 tablespoons of seeds, or ½ cup of cooked beans or peas. · Limit fats and oils to 2 to 3 servings each day. A serving is 1 teaspoon of vegetable oil or 2 tablespoons of salad dressing. · Limit sweets and added sugars to 5 servings or less a week. A serving is 1 tablespoon jelly or jam, ½ cup sorbet, or 1 cup of lemonade. · Eat less than 2,300 milligrams (mg) of sodium a day. If you limit your sodium to 1,500 mg a day, you can lower your blood pressure even more. Tips for success · Start small. Do not try to make dramatic changes to your diet all at once. You might feel that you are missing out on your favorite foods and then be more likely to not follow the plan.  Make small changes, and stick with them. Once those changes become habit, add a few more changes. · Try some of the following: ¨ Make it a goal to eat a fruit or vegetable at every meal and at snacks. This will make it easy to get the recommended amount of fruits and vegetables each day. ¨ Try yogurt topped with fruit and nuts for a snack or healthy dessert. ¨ Add lettuce, tomato, cucumber, and onion to sandwiches. ¨ Combine a ready-made pizza crust with low-fat mozzarella cheese and lots of vegetable toppings. Try using tomatoes, squash, spinach, broccoli, carrots, cauliflower, and onions. ¨ Have a variety of cut-up vegetables with a low-fat dip as an appetizer instead of chips and dip. ¨ Sprinkle sunflower seeds or chopped almonds over salads. Or try adding chopped walnuts or almonds to cooked vegetables. ¨ Try some vegetarian meals using beans and peas. Add garbanzo or kidney beans to salads. Make burritos and tacos with mashed salas beans or black beans. Where can you learn more? Go to http://rachel-velma.info/. Enter F470 in the search box to learn more about \"DASH Diet: Care Instructions. \" Current as of: December 6, 2017 Content Version: 11.7 © 2314-9056 [employer], [employer]. Care instructions adapted under license by [employer] (which disclaims liability or warranty for this information). If you have questions about a medical condition or this instruction, always ask your healthcare professional. [place] [address] any warranty or liability for your use of this information. Introducing [facility] & HEALTH SERVICES! Tiffanie Fuller introduces [employer] patient portal. Now you can access parts of your medical record, email your doctor's office, and request medication refills online. 1. In your internet browser, go to [email or web address] 2. Click on the First Time User? Click Here link in the Sign In box. You will see the New Member Sign Up page. 3. Enter your [employer] Access Code exactly as it appears below. You will not need to use this code after youve completed the sign-up process. If you do not sign up before the expiration date, you must request a new code. · [employer] Access Code: [email or web address] Expires: 8/1/2018 12:34 PM 
 
4. Enter the last four digits of your Social Security Number (xxxx) and Date of Birth (mm/dd/yyyy) as indicated and click Submit. You will be taken to the next sign-up page. 5. Create a Beyond Credentials ID. This will be your [employer] login ID and cannot be changed, so think of one that is secure and easy to remember. 6. Create a [employer] password. You can change your password at any time. 7. Enter your Password Reset Question and Answer. This can be used at a later time if you forget your password. 8. Enter your e-mail address. You will receive e-mail notification when new information is available in [address] 9. Click Sign Up. You can now view and download portions of your medical record. 10. Click the Download Summary menu link to download a portable copy of your medical information. If you have questions, please visit the Frequently Asked Questions section of the [employer] website. Remember, [employer] is NOT to be used for urgent needs. For medical emergencies, dial 911. Now available from your iPhone and Android! Please provide this summary of care documentation to your next provider. Your primary care clinician is listed as [address] [place] [address]. If you have any questions after today's visit, please call [phone].

## 2018-07-17 RX ORDER — LOSARTAN POTASSIUM 100 MG/1
TABLET ORAL
Qty: 30 TAB | Refills: 6 | Status: SHIPPED | OUTPATIENT
Start: 2018-07-17 | End: 2019-08-16 | Stop reason: ALTCHOICE

## 2018-09-05 ENCOUNTER — PATIENT OUTREACH (OUTPATIENT)
Dept: CARDIOLOGY CLINIC | Age: 38
End: 2018-09-05

## 2018-09-05 NOTE — LETTER
9/5/2018 9:57 AM 
 
Ms. Luis Simms [address] [phone] Ranjit Sewell am the navigator for Dr. Nadeen Aly and I talked to you today on the phone - Our office nurse will call you to reschedule your appointment - If you have trouble with transportation for the appointment - call me - and I can try to help - it is important for you to see him - for your heart evaluation . I am enclosing blood pressure charts and daily weight charts -  
Daily weights help you watch for holding fluid. If your blood pressure - resting at home - is over 140 on the top/ over 90 on the bottom for several times - please call Dr. Vishal Contreras -  
 
I am also enclosing information about salt in the diet - and about healthy eating - We encourage your to walk every day - but being careful with the heat - Do not walk or exercise outside if it is hot - better to walk at the mall. Call me if you have questions/ concerns - My line is direct at [phone]. Pennie Diaz RN , CHFN, CCM 
[facility] Luis Espinoza [phone]

## 2018-09-05 NOTE — LETTER
9/5/2018 12:13 PM 
 
Ms. Luis Simms [address] [phone] Upcoming appointments -  
 
10/9/2018 1:40 PM Dede Del Cid MD [facility] [place] [id number] [facility]  
10/12/2018 11:40 AM Maria R Marion MD [place] [facility] Dr. Zamzam Jones office is at the corner of [address] - 2nd floor - [address]. Sarah Cool RN , CHFN, CCM 
[place] CAV [facility] Nurse [phone]

## 2018-09-05 NOTE — PROGRESS NOTES
Nurse navigator note - cardiology Appt made for Dr. Elsa Linda -  
10/9/2018 1:40 PM Robbin Sanchez MD [facility] [address]  
10/12/2018 11:40 AM Estefani Fuller MD [place] [facility] LM for patient with date and time and sending letter with information. Call to and reached pt for follow up - She said a doctor came today to her house - from insurance - and bp was 128/101 - she continues to get elevated bp readings at home - She is taking Labetalol, Amlodipine, and Clonidine 0.2 mg but only once a day - it makes her sleepy - 
 
Plan:  Encouraged Clonidine bid -pt only taking every day -  
 Continue to monitor bp am/ pm 
 Note to Dr. Pamela Stokes - re: appt this Friday - 9/21/18 (he thought he could do that) Pt said she can get to Dr. Pamela Stokes office. See Dr. Elsa Linda 10/9 Per Dr. Elsa Linda - recommendations - \"She should take clonidine twice a day until appt in October \" \" Losartan 50 mg every day if SBP is over 140 mm Hg since we go up on clonidine too.  I would wait to see what clonidine does \" Eleni Kong RN , CHFN, [facility] 
NN [facility] [place] [phone]

## 2018-09-05 NOTE — PROGRESS NOTES
Nurse navigator note - cardiology Consultation with NN - in regards to pt's missed appt with Dr. Danny Molina x2 - Pt was seen in Dr. Reji Gunn office by PA - on 7/11 - with the following note - 
\" 1. Essential hypertension I10 401.9 valsartan (DIOVAN) 160 mg tablet  
      cloNIDine HCl (CATAPRES) 0.2 mg tablet  
      CANCELED: AMB POC EKG ROUTINE W/ 12 LEADS, INTER & REP 2. Hypertrophic cardiomyopathy (HCC) I42.2 425.18    
3. Severe obesity (BMI 35.0-39. 9) with comorbidity ([facility] [address] E66.01 278.01    
   
  
Discussion: Patient presents at this time hypertensive. BP was not controlled. Pt has been without her medications for a few days. She has not followed up with Dr. Danny Molina as previously planned. Pt's non-compliance is the biggest zafar to her health, despite continued warning and recent sudden cardiac death of her brother. \" 
______________________________________________________________________________ 
NN call to and reached pt - she said she knew she missed the appointment - she said she has seen a new pcp - Dr. Renata Casanova - and that he rx'd her inhalers. She said she has her medications - cardiology - which we reviewed - Pt has: Clonidine 0.2 mg - she uses at hs only (makes her tired and dizzy) ( Every day vs bid) Sprinolactone 25 mg every day Amlodipine 10 mg every day Labetalol 200 mg bid 
  asa 81 every day She is no longer on Valsartan - per pt. Plan: Office nurse to call and reschedule appt - with Dr. Danny Molina. Pt said she has a ride to office - if transportation needs -  
 Discussed with pt the reason for the appointment and risk management /lifestyle Adaptations - (activity/ sodium, bp management, medication compliance). Pt to monitor bp and record- she said she has cuff. Daily weights encouraged Information mailed to pt re: office contacts/ sodium/ lifestyle modification = 
bp and weight charts. Kelly Francois RN , CHFN, CCM 
[place] [facility] [phone]

## 2018-09-21 ENCOUNTER — OFFICE VISIT (OUTPATIENT)
Dept: CARDIOLOGY CLINIC | Age: 38
End: 2018-09-21

## 2018-09-21 VITALS
OXYGEN SATURATION: 99 % | HEIGHT: 62 IN | BODY MASS INDEX: 33.31 KG/M2 | WEIGHT: 181 LBS | DIASTOLIC BLOOD PRESSURE: 85 MMHG | SYSTOLIC BLOOD PRESSURE: 124 MMHG | RESPIRATION RATE: 18 BRPM | HEART RATE: 77 BPM

## 2018-09-21 DIAGNOSIS — R07.9 CHEST PAIN, UNSPECIFIED TYPE: ICD-10-CM

## 2018-09-21 DIAGNOSIS — I51.7 LEFT VENTRICULAR HYPERTROPHY: ICD-10-CM

## 2018-09-21 DIAGNOSIS — I11.9 MALIGNANT HTN WITH HEART DISEASE, W/O CHF, W/O CHRONIC KIDNEY DISEASE: ICD-10-CM

## 2018-09-21 DIAGNOSIS — I42.2 HYPERTROPHIC CARDIOMYOPATHY (HCC): ICD-10-CM

## 2018-09-21 DIAGNOSIS — I10 ESSENTIAL HYPERTENSION: ICD-10-CM

## 2018-09-21 DIAGNOSIS — R60.0 EDEMA OF BOTH LEGS: Primary | ICD-10-CM

## 2018-09-21 RX ORDER — LABETALOL 300 MG/1
300 TABLET, FILM COATED ORAL 2 TIMES DAILY
Qty: 60 TAB | Refills: 6 | Status: SHIPPED | OUTPATIENT
Start: 2018-09-21 | End: 2019-05-17 | Stop reason: SDUPTHER

## 2018-09-21 NOTE — PATIENT INSTRUCTIONS
I have increased your LABETALOL to 300 mg twice a day (morning and night). You don't have to fill this until you use up the LABETALOL 200 mg tablets that you now have, but take them 3 times a day until you run out. That will be the equivalent of the 300 mg twice a day. Please stop the clonidine because it will make your blood pressure get very high when it wears off 12 hours later if you don't take it again until the next night. I will communicate with Dr. Shanita Stahl about these changes and my reasoning. I have ordered some updated lab work to check kidney function. You can get this done on the way out today.

## 2018-09-21 NOTE — MR AVS SNAPSHOT
[address] [facility] 
 
 
 [address] [facility] 
[phone] Patient: Essence Goddard 
MRN: [id number] Visit Information Date & Time Provider Department Dept. Phone Encounter #  
 9/21/2018  1:00 PM MD Neelam Al [facility] [id number] Your Appointments 10/9/2018  1:40 PM  
New Patient with Garrett Kelly MD  
[address] ([address]) Appt Note: dx: Hypertrophic cardiomyopathy (HCC) FHx: sudden cardiac death (SCD) per Dr Claude Dimmer, [facility] referral; pt not seen; dx: Hypertrophic cardiomyopathy ([address] FHx: sudden cardiac death (SCD) per Dr Claude Dimmer, [facility] referral; pt r/s from 6/5; r/s by pt  
 [address] [place] [address]  
[address] [place] [address]  
  
    
 10/12/2018 11:40 AM  
ESTABLISHED PATIENT with MD Neelam Al [facility]) Appt Note: 3 MO. F/U  
 [address] [facility]  
[phone] [address] Upcoming Health Maintenance Date Due Pneumococcal 19-64 Medium Risk (1 of 1 - PPSV23) 2/26/2000 PAP AKA CERVICAL CYTOLOGY 2/26/2002 MEDICARE YEARLY EXAM 7/19/2018 Influenza Age 5 to Adult 8/1/2018 DTaP/Tdap/Td series (2 - Td) 12/10/2025 Allergies as of 9/21/2018  Review Complete On: 9/21/2018 By: Thuy Aguirre LPN Severity Noted Reaction Type Reactions Other Food High 06/16/2017    Anaphylaxis Old Bey seasoning Iodine  07/13/2010    Itching Shellfish Containing Products  07/13/2010    Itching Current Immunizations  Never Reviewed Name Date  
 TD Vaccine 7/27/2011  1:09 AM  
 Tdap 12/10/2015  2:53 PM  
  
 Not reviewed this visit You Were Diagnosed With   
  
 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 
ICD-9-CM: 401.9 Chest pain, unspecified type     ICD-10-CM: R07.9 ICD-9-CM: 786.50 Left ventricular hypertrophy     ICD-10-CM: I51.7 ICD-9-CM: 429.3 Vitals BP Pulse Resp Height(growth percentile) Weight(growth percentile) SpO2  
 124/85 (BP 1 Location: Right arm, BP Patient Position: Sitting) 77 18 5' 2\" (1.575 m) 181 lb (82.1 kg) 99% BMI OB Status Smoking Status 33.11 kg/m2 Having regular periods Current Some Day Smoker Vitals History BMI and BSA Data Body Mass Index Body Surface Area  
 33.11 kg/m 2 1.9 m 2 Preferred Pharmacy Pharmacy Name Phone [facility] [address] [place] [phone] Your Updated Medication List  
  
   
This list is accurate as of 9/21/18  1:49 PM.  Always use your most recent med list.  
  
  
  
  
 ADVAIR DISKUS 100-50 mcg/dose diskus inhaler Generic drug:  fluticasone-salmeterol Take 1 Puff by inhalation every twelve (12) hours. amLODIPine 10 mg tablet Commonly known as:  NORVASC  
take 1 tablet by mouth once daily  
  
 aspirin delayed-release 81 mg tablet Take  by mouth daily. DEBLITANE 0.35 mg Tab Generic drug:  norethindrone  
  
 divalproex  mg tablet Commonly known as:  DEPAKOTE  
take 1 tablet by mouth twice a day for ANGER AND MOOD STABILITY FISH OIL 1,000 mg Cap Generic drug:  omega-3 fatty acids-vitamin e Take 1 Cap by mouth daily. FLUoxetine 20 mg capsule Commonly known as:  PROzac  
take 1 capsule by mouth once daily for depression and anxiety  
  
 labetalol 300 mg tablet Commonly known as:  Oscar Alvarado  
 Take 1 Tab by mouth two (2) times a day. Indications: hypertension  
  
 lidocaine 5 % ointment Commonly known as:  XYLOCAINE Apply  to affected area two (2) times a day. losartan 100 mg tablet Commonly known as:  COZAAR  
take 1 tablet by mouth once daily hypertension  
  
 metroNIDAZOLE 500 mg tablet Commonly known as:  FLAGYL  
  
 PROAIR HFA 90 mcg/actuation inhaler Generic drug:  albuterol  
inhale 2 puffs by mouth every 4 hours  
  
 spironolactone 25 mg tablet Commonly known as:  ALDACTONE Take 1 Tab by mouth daily. triamcinolone acetonide 0.1 % ointment Commonly known as:  KENALOG  
apply to affected area twice a day if needed for eczema  
  
 valACYclovir 500 mg tablet Commonly known as:  VALTREX  
  
 VITAMIN D3 PO Take  by mouth daily. Prescriptions Sent to Pharmacy Refills  
 labetalol (NORMODYNE) 300 mg tablet 6 Sig: Take 1 Tab by mouth two (2) times a day. Indications: hypertension Class: Normal  
 Pharmacy: [address] Ph #: [phone] Route: Oral  
  
We Performed the Following CBC W/O DIFF [58873 CPT(R)] METABOLIC PANEL, COMPREHENSIVE [32265 CPT(R)] Patient Instructions I have increased your LABETALOL to 300 mg twice a day (morning and night). You don't have to fill this until you use up the LABETALOL 200 mg tablets that you now have, but take them 3 times a day until you run out. That will be the equivalent of the 300 mg twice a day. Please stop the clonidine because it will make your blood pressure get very high when it wears off 12 hours later if you don't take it again until the next night. I will communicate with Dr. Xin Hernandez about these changes and my reasoning. I have ordered some updated lab work to check kidney function. You can get this done on the way out today. Introducing [facility] & HEALTH SERVICES! [facility] introduces [employer] patient portal. Now you can access parts of your medical record, email your doctor's office, and request medication refills online. 1. In your internet browser, go to [email or web address] 2. Click on the First Time User? Click Here link in the Sign In box. You will see the New Member Sign Up page. 3. Enter your [employer] Access Code exactly as it appears below. You will not need to use this code after youve completed the sign-up process. If you do not sign up before the expiration date, you must request a new code. · [employer] Access Code: [email or web address] Expires: 12/20/2018  1:46 PM 
 
4. Enter the last four digits of your Social Security Number (xxxx) and Date of Birth (mm/dd/yyyy) as indicated and click Submit. You will be taken to the next sign-up page. 5. Create a [employer] ID. This will be your [employer] login ID and cannot be changed, so think of one that is secure and easy to remember. 6. Create a [employer] password. You can change your password at any time. 7. Enter your Password Reset Question and Answer. This can be used at a later time if you forget your password. 8. Enter your e-mail address. You will receive e-mail notification when new information is available in [address] 9. Click Sign Up. You can now view and download portions of your medical record. 10. Click the Download Summary menu link to download a portable copy of your medical information. If you have questions, please visit the Frequently Asked Questions section of the [employer] website. Remember, [employer] is NOT to be used for urgent needs. For medical emergencies, dial 911. Now available from your iPhone and Android! Please provide this summary of care documentation to your next provider. Your primary care clinician is listed as [address] [facility]. If you have any questions after today's visit, please call [phone].

## 2018-09-21 NOTE — PROGRESS NOTES
Chief Complaint   Patient presents with    Hypertension     Fu     1. Have you been to the ER, urgent care clinic since your last visit? Hospitalized since your last visit? No    2. Have you seen or consulted any other health care providers outside of the [address] [place] [address] since your last visit? Include any pap smears or colon screening.  No

## 2018-09-21 NOTE — PROGRESS NOTES
Patient recalled because of uncontrolled HTN. Vickey Sharp in Dr. Wendi Rossi office has been very concerned. I explained to the patient that once daily Clonidine is likely causing rebound HTN 12 hours later when she skips the next dose. She has not had any lightheadedness or dizziness, but she is getting ankle swelling. The baseline cardiac status influencing treatment is as follows:    IMPRESSIONS:  Significant concentric LVH with increased myocardial mass and unusually  high ejection fraction, but with surprisingly litle diastolic impairment. Moderate elevation of PA pressure. SUMMARY:  Left ventricle: The cavity was small. Systolic function was hyperdynamic  by visual assessment. Ejection fraction was estimated to be 65 % to 70 %. There were no regional wall motion abnormalities. Wall thickness was  moderately to markedly increased. There was moderate concentric  hypertrophy. There was no asymmetric hypertrophy. Mass was definitely  increased. Features were consistent with a pseudonormal left ventricular  filling pattern, with concomitant abnormal relaxation and increased  filling pressure (grade 2 diastolic dysfunction). Ventricular septum: Thickness was markedly increased. Right ventricle: The ventricle was mildly dilated. Wall thickness was  mildly increased. Systolic pressure was moderately to markedly increased. Estimated peak pressure was 45 mmHg. Left atrium: The atrium was mildly to moderately dilated. Mitral valve: There was mild regurgitation. No obvious mass, vegetation or  thrombus noted. On exam, she has 2+ edema R and 1+ edema L with no sign of DVT. Lungs are clear.

## 2018-09-22 LAB
ALBUMIN SERPL-MCNC: 4.7 G/DL (ref 3.5–5.5)
ALBUMIN/GLOB SERPL: 1.5 {RATIO} (ref 1.2–2.2)
ALP SERPL-CCNC: 75 IU/L (ref 39–117)
ALT SERPL-CCNC: 19 IU/L (ref 0–32)
AST SERPL-CCNC: 20 IU/L (ref 0–40)
BILIRUB SERPL-MCNC: 0.3 MG/DL (ref 0–1.2)
BUN SERPL-MCNC: 16 MG/DL (ref 6–20)
BUN/CREAT SERPL: 19 (ref 9–23)
CALCIUM SERPL-MCNC: 9.6 MG/DL (ref 8.7–10.2)
CHLORIDE SERPL-SCNC: 103 MMOL/L (ref 96–106)
CO2 SERPL-SCNC: 19 MMOL/L (ref 20–29)
CREAT SERPL-MCNC: 0.84 MG/DL (ref 0.57–1)
ERYTHROCYTE [DISTWIDTH] IN BLOOD BY AUTOMATED COUNT: 19.1 % (ref 12.3–15.4)
GLOBULIN SER CALC-MCNC: 3.1 G/DL (ref 1.5–4.5)
GLUCOSE SERPL-MCNC: 81 MG/DL (ref 65–99)
HCT VFR BLD AUTO: 37.1 % (ref 34–46.6)
HGB BLD-MCNC: 11.6 G/DL (ref 11.1–15.9)
MCH RBC QN AUTO: 26.6 PG (ref 26.6–33)
MCHC RBC AUTO-ENTMCNC: 31.3 G/DL (ref 31.5–35.7)
MCV RBC AUTO: 85 FL (ref 79–97)
PLATELET # BLD AUTO: 354 X10E3/UL (ref 150–379)
POTASSIUM SERPL-SCNC: 4.1 MMOL/L (ref 3.5–5.2)
PROT SERPL-MCNC: 7.8 G/DL (ref 6–8.5)
RBC # BLD AUTO: 4.36 X10E6/UL (ref 3.77–5.28)
SODIUM SERPL-SCNC: 138 MMOL/L (ref 134–144)
WBC # BLD AUTO: 7.4 X10E3/UL (ref 3.4–10.8)

## 2018-09-28 RX ORDER — CLONIDINE HYDROCHLORIDE 0.2 MG/1
0.2 TABLET ORAL
Refills: 0 | COMMUNITY
Start: 2018-09-04 | End: 2022-09-26

## 2018-09-28 RX ORDER — LABETALOL 200 MG/1
TABLET, FILM COATED ORAL
Refills: 1 | COMMUNITY
Start: 2018-07-16 | End: 2018-09-28 | Stop reason: DRUGHIGH

## 2018-10-09 ENCOUNTER — OFFICE VISIT (OUTPATIENT)
Dept: CARDIOLOGY CLINIC | Age: 38
End: 2018-10-09

## 2018-10-09 ENCOUNTER — PATIENT OUTREACH (OUTPATIENT)
Dept: CARDIOLOGY CLINIC | Age: 38
End: 2018-10-09

## 2018-10-09 VITALS
WEIGHT: 186 LBS | SYSTOLIC BLOOD PRESSURE: 122 MMHG | BODY MASS INDEX: 34.23 KG/M2 | HEART RATE: 71 BPM | DIASTOLIC BLOOD PRESSURE: 76 MMHG | HEIGHT: 62 IN

## 2018-10-09 DIAGNOSIS — Z82.41 FHX: SUDDEN CARDIAC DEATH (SCD): ICD-10-CM

## 2018-10-09 DIAGNOSIS — I42.2 HYPERTROPHIC CARDIOMYOPATHY (HCC): Primary | ICD-10-CM

## 2018-10-09 DIAGNOSIS — I10 ESSENTIAL HYPERTENSION: ICD-10-CM

## 2018-10-09 DIAGNOSIS — E66.01 SEVERE OBESITY (BMI 35.0-39.9) WITH COMORBIDITY (HCC): ICD-10-CM

## 2018-10-09 NOTE — PROGRESS NOTES
Chief Complaint   Patient presents with    Cardiomyopathy    New Patient     per Dr. Casa Calvillo     Verified patient with two types of identifiers. .  Verified medications with the patient.     Verified patient's pharmacy

## 2018-10-09 NOTE — MR AVS SNAPSHOT
[address] [facility] [address] [facility] 
[phone] Patient: Chas Álvarez 
MRN: [id number] APS:4/90/9725 Visit Information Date & Time Provider Department Dept. Phone Encounter #  
 10/9/2018  1:40 PM Verona Alas MD [facility] Sm[facility] [phone] [id number] Your Appointments 10/12/2018 11:40 AM  
ESTABLISHED PATIENT with MD [facility]) Appt Note: 3 MO. F/U  
 [address]  
[phone] [address] Upcoming Health Maintenance Date Due Pneumococcal 19-64 Medium Risk (1 of 1 - PPSV23) 2/26/2000 PAP AKA CERVICAL CYTOLOGY 2/26/2002 MEDICARE YEARLY EXAM 7/19/2018 Influenza Age 5 to Adult 8/1/2018 DTaP/Tdap/Td series (2 - Td) 12/10/2025 Allergies as of 10/9/2018  Review Complete On: 10/9/2018 By: Verona Alas MD  
  
 Severity Noted Reaction Type Reactions Other Food High 06/16/2017    Anaphylaxis Old Bey seasoning Iodine  07/13/2010    Itching Shellfish Containing Products  07/13/2010    Itching Current Immunizations  Never Reviewed Name Date  
 TD Vaccine 7/27/2011  1:09 AM  
 Tdap 12/10/2015  2:53 PM  
  
 Not reviewed this visit You Were Diagnosed With   
  
 Codes Comments Hy[facility]    -  Primary ICD-10-CM: I42.2 ICD-9-CM: 425.18 Essential hypertension     ICD-10-CM: I10 
ICD-9-CM: 401.9 Vitals BP Pulse Height(growth percentile) Weight(growth percentile) BMI OB Status 122/76 (BP 1 Location: Left arm, BP Patient Position: Sitting) 71 5' 2\" (1.575 m) 186 lb (84.4 kg) 34.02 kg/m2 Having regular periods Smoking Status Current Some Day Smoker Vitals History BMI and BSA Data Body Mass Index Body Surface Area 34.02 kg/m 2 1.92 m 2 Preferred Pharmacy Pharmacy Name Phone [address] Jerzy Jones [phone] Your Updated Medication List  
  
   
This list is accurate as of 10/9/18  1:49 PM.  Always use your most recent med list.  
  
  
  
  
 ADVAIR DISKUS 100-50 mcg/dose diskus inhaler Generic drug:  fluticasone-salmeterol Take 1 Puff by inhalation every twelve (12) hours. amLODIPine 10 mg tablet Commonly known as:  NORVASC  
take 1 tablet by mouth once daily  
  
 aspirin delayed-release 81 mg tablet Take  by mouth daily. cloNIDine HCl 0.2 mg tablet Commonly known as:  CATAPRES  
  
 DEBLITANE 0.35 mg Tab Generic drug:  norethindrone  
  
 divalproex  mg tablet Commonly known as:  DEPAKOTE  
take 1 tablet by mouth twice a day for ANGER AND MOOD STABILITY FISH OIL 1,000 mg Cap Generic drug:  omega-3 fatty acids-vitamin e Take 1 Cap by mouth daily. FLUoxetine 20 mg capsule Commonly known as:  PROzac  
take 1 capsule by mouth once daily for depression and anxiety  
  
 labetalol 300 mg tablet Commonly known as:  Gearldine Lowgap Take 1 Tab by mouth two (2) times a day. Indications: hypertension  
  
 lidocaine 5 % ointment Commonly known as:  XYLOCAINE Apply  to affected area two (2) times a day. losartan 100 mg tablet Commonly known as:  COZAAR  
take 1 tablet by mouth once daily hypertension  
  
 metroNIDAZOLE 500 mg tablet Commonly known as:  FLAGYL  
  
 PROAIR HFA 90 mcg/actuation inhaler Generic drug:  albuterol  
inhale 2 puffs by mouth every 4 hours  
  
 spironolactone 25 mg tablet Commonly known as:  ALDACTONE Take 1 Tab by mouth daily. triamcinolone acetonide 0.1 % ointment Commonly known as:  KENALOG  
apply to affected area twice a day if needed for eczema  
  
 valACYclovir 500 mg tablet Commonly known as:  VALTREX  
  
 VITAMIN D3 PO Take  by mouth daily. To-Do List   
 10/09/2018 Imaging:  [facility] Patient Instructions You will receive a call from central scheduling to schedule the cardiac MRI at [facility]. Introducing [facility] SERVICES! [facility] introduces [employer] patient portal. Now you can access parts of your medical record, email your doctor's office, and request medication refills online. 1. In your internet browser, go to [email or web address] 2. Click on the First Time User? Click Here link in the Sign In box. You will see the New Member Sign Up page. 3. Enter your [employer] Access Code exactly as it appears below. You will not need to use this code after youve completed the sign-up process. If you do not sign up before the expiration date, you must request a new code. · [employer] Access Code: [email or web address] Expires: 12/20/2018  1:46 PM 
 
4. Enter the last four digits of your Social Security Number (xxxx) and Date of Birth (mm/dd/yyyy) as indicated and click Submit. You will be taken to the next sign-up page. 5. Create a [employer] ID. This will be your [employer] login ID and cannot be changed, so think of one that is secure and easy to remember. 6. Create a [employer] password. You can change your password at any time. 7. Enter your Password Reset Question and Answer. This can be used at a later time if you forget your password. 8. Enter your e-mail address. You will receive e-mail notification when new information is available in [address] 9. Click Sign Up. You can now view and download portions of your medical record. 10. Click the Download Summary menu link to download a portable copy of your medical information. If you have questions, please visit the Frequently Asked Questions section of the [employer] website. Remember, [employer] is NOT to be used for urgent needs. For medical emergencies, dial 911. Now available from your iPhone and Android! Please provide this summary of care documentation to your next provider. Your primary care clinician is listed as [facility]. If you have any questions after today's visit, please call [phone].

## 2018-10-09 NOTE — PROGRESS NOTES
Nurse navigator note - cardiology Call to and reached Ms. Chalino Ruth - reminder of appt today - She said she was aware and has transportation with insurance assistance. She said her bp has been elevated 140-150 range over 100 range. Clonidine was stopped - by Dr. Dayami Hobson - b/c she was only taking once a day - and concerns about rebound - with no second dose. She recounts taking Labetalol 300 mg bid - and Spironolactone 25 mg for fluid. Plan: Bring bp readings and cuff Provide scale to pt for daily am weights Bring bp cuff for check against office equipment. Leg elevation twice a day - Note to provider and team - Samuel Mosquera, RN , CHFN, CCM 
NN BHAVIN Medina [phone]

## 2018-10-09 NOTE — PATIENT INSTRUCTIONS
You will receive a call from central scheduling to schedule the cardiac MRI at [facility].

## 2018-10-09 NOTE — PROGRESS NOTES
Cardiac Electrophysiology OFFICE Consultation Note     Subjective:      Beny Styles is a 41 y.o. patient who is seen for evaluation of LV hypertrophy  patient's brother  suddenly at age 44 and he has LVH on ECG and echo but echo said severe concentric hypertrophy and she said she has been treated for BP long time  Echo at [facility] 2016:  Left ventricle: The cavity was small. LV Ejection fraction was estimated to be 65 % to 70 %. Wall thickness was moderately to markedly increased. There was moderate concentric  hypertrophy. There was no asymmetric hypertrophy. Mass was definitely  increased. Features were consistent with a pseudonormal left ventricular  filling pattern, with concomitant abnormal relaxation and increased  filling pressure (grade 2 diastolic dysfunction). Ventricular septum: Thickness was markedly increased. Right ventricle: The ventricle was mildly dilated. Wall thickness was  mildly increased. Systolic pressure was moderately to markedly increased. Estimated peak pressure was 45 mmHg. Left atrium: The atrium was mildly to moderately dilated. Mitral valve: There was mild regurgitation. No obvious mass, vegetation or  thrombus noted. Aortic valve: No obvious mass, vegetation or thrombus noted. Tricuspid valve: There was mild regurgitation. NSVT on event monitor  The patient did not have syncope. She mentioned her sister had a dilated heart but did not know the cause. Her brother did not have an autopsy. Dr. Maynor Henson has been adjusting her blood pressure medications. Nuclear stress test 2018 no ischemia but + LVH, LVEF 62%    Patient Active Problem List   Diagnosis Code    Abscess L02.91    Mood disorder ([address] F39    Alcohol abuse F10.10    Cannabis abuse F12.10    Chest wall pain R07.89    Essential hypertension I10    Severe obesity (BMI 35.0-39. 9) with comorbidity ([address] E66.01    Hypertrophic cardiomyopathy ([address] I42.2     Current Outpatient Prescriptions   Medication Sig Dispense Refill    cloNIDine HCl (CATAPRES) 0.2 mg tablet   0    labetalol (NORMODYNE) 300 mg tablet Take 1 Tab by mouth two (2) times a day. Indications: hypertension 60 Tab 6    losartan (COZAAR) 100 mg tablet take 1 tablet by mouth once daily hypertension 30 Tab 6    spironolactone (ALDACTONE) 25 mg tablet Take 1 Tab by mouth daily. 30 Tab 6    amLODIPine (NORVASC) 10 mg tablet take 1 tablet by mouth once daily 30 Tab 6    DEBLITANE 0.35 mg tab   1    metroNIDAZOLE (FLAGYL) 500 mg tablet   0    FLUoxetine (PROZAC) 20 mg capsule take 1 capsule by mouth once daily for depression and anxiety  0    divalproex DR (DEPAKOTE) 500 mg tablet take 1 tablet by mouth twice a day for ANGER AND MOOD STABILITY  0    CHOLECALCIFEROL, VITAMIN D3, (VITAMIN D3 PO) Take  by mouth daily.  valACYclovir (VALTREX) 500 mg tablet   1    fluticasone-salmeterol (ADVAIR DISKUS) 100-50 mcg/dose diskus inhaler Take 1 Puff by inhalation every twelve (12) hours.  lidocaine (XYLOCAINE) 5 % ointment Apply  to affected area two (2) times a day. 1 Tube 0    omega-3 fatty acids-vitamin e (FISH OIL) 1,000 mg cap Take 1 Cap by mouth daily.  aspirin delayed-release 81 mg tablet Take  by mouth daily.       PROAIR HFA 90 mcg/actuation inhaler inhale 2 puffs by mouth every 4 hours  0    triamcinolone acetonide (KENALOG) 0.1 % ointment apply to affected area twice a day if needed for eczema  0     Allergies   Allergen Reactions    Other Food Anaphylaxis     Old Bey seasoning    Iodine Itching    Shellfish Containing Products Itching     Past Medical History:   Diagnosis Date    Abscess 7/11/2016    CAD (coronary artery disease)     enlarged heart    Delivery normal     Hypertension     Psychiatric disorder     depresssion    Tubal ligation status 12/10/15     Past Surgical History:   Procedure Laterality Date    HX GYN      tubal ligation    HX ORTHOPAEDIC      tubal pregnancy removal    HX OTHER SURGICAL      abscesses       Social History   Substance Use Topics    Smoking status: Current Some Day Smoker     Packs/day: 0.25    Smokeless tobacco: Never Used    Alcohol use Yes      Comment: socially        Review of Systems:   Constitutional: Negative for fever, chills, weight loss, malaise/fatigue. HEENT: Negative for nosebleeds, vision changes. Respiratory: Negative for cough, hemoptysis  Cardiovascular: Negative for chest pain, palpitations, orthopnea, claudication, leg swelling, syncope, and PND. Gastrointestinal: Negative for nausea, vomiting, diarrhea, blood in stool and melena. Genitourinary: Negative for dysuria, and hematuria. Musculoskeletal: Negative for myalgias, arthralgia. Skin: Negative for rash. Heme: Does not bleed or bruise easily. Neurological: Negative for speech change and focal weakness     Objective:     Visit Vitals    Ht 5' 2\" (1.575 m)    Wt 186 lb (84.4 kg)    BMI 34.02 kg/m2      Physical Exam:   Constitutional: well-developed and well-nourished. No respiratory distress. Head: Normocephalic and atraumatic. Eyes: Pupils are equal, round  ENT: hearing normal  Neck: supple. No JVD present. Cardiovascular: Normal rate, regular rhythm. Exam reveals no gallop and no friction rub. No murmur heard. Pulmonary/Chest: Effort normal and breath sounds normal. No wheezes. Abdominal: Soft, no tenderness. Obese  Musculoskeletal: no edema. Neurological: alert,oriented. Skin: Skin is warm and dry  Psychiatric: normal mood and affect. Behavior is normal. Judgment and thought content normal.         Assessment/Plan:       ICD-10-CM ICD-9-CM    1. Hypertrophic cardiomyopathy (HCC) I42.2 425.18 MRI CARDIAC MORPH FUNC W WO CONT   2. Essential hypertension I10 401.9 MRI CARDIAC MORPH FUNC W WO CONT   3. FHx: sudden cardiac death (SCD) Z82.41 V17.41    4. Severe obesity (BMI 35.0-39. 9) with comorbidity ([address] E66.01 278.01      BP is controlled today  Family hx of sudden death  reviewed medications and side effects in detail   Previous EKG showed sinus rhythm with LVH and abnormal repolarization. Given her family history of sudden cardiac death in her brother and her sister has dilated cardiomyopathy, I recommended she get a cardiac MRI because the 2-D echo and nuclear stress test reported concentric left ventricular hypertrophy and no asymmetric left ventricular hypertrophy. We need to clarify this. She wanted to undergo a cardiac MRI at [facility] rather than [facility].     If she does have IHSS, she will be a candidate for ICD given the family history of sudden cardiac death. She is on labetalol    BP is controlled today    Thank you for involving me in this patient's care and please call with further concerns or questions. Cely Case M.D.   Electrophysiology/Cardiology  [facility] [place]  [facility] [address], [address] [place] [address], [address] [place] [address] Avenue                             [address] [place] [address]  [phone]

## 2019-01-16 NOTE — PROGRESS NOTES
At present, despite peripheral edema, blood pressure is 124/85. Pulse is 84 and regular. There is no sign of DVT. Jugular veins are flat above 30 degrees torso elevation and there is no pulsation. There is no carotid bruit. There is no renal bruit. There is no abdominal pulsation. Lungs are clear. Cardiac auscultation is unremarkable. Twelve-lead EKG was not repeated today. Previous tracings show marginal changes for LVH and mild anterolateral ST segment flattening suggesting LV strain when the blood pressure is higher. Impression: Variable hypertension possibly related to the way she is taking her medications.     Plan:  Increase labetalol to 600 mg/day in divided doses    Stop the clonidine since she is only taking it once a day    Updated lab work to check kidney function    Follow-up within 1 month, notify Dr. Hannah Lombardo of these changes

## 2019-01-18 ENCOUNTER — OFFICE VISIT (OUTPATIENT)
Dept: CARDIOLOGY CLINIC | Age: 39
End: 2019-01-18

## 2019-01-18 VITALS
WEIGHT: 190 LBS | RESPIRATION RATE: 18 BRPM | HEART RATE: 81 BPM | SYSTOLIC BLOOD PRESSURE: 140 MMHG | HEIGHT: 62 IN | DIASTOLIC BLOOD PRESSURE: 86 MMHG | OXYGEN SATURATION: 97 % | BODY MASS INDEX: 34.96 KG/M2

## 2019-01-18 DIAGNOSIS — I42.2 HYPERTROPHIC CARDIOMYOPATHY (HCC): ICD-10-CM

## 2019-01-18 DIAGNOSIS — Z82.41 FAMILY HISTORY OF SUDDEN CARDIAC DEATH: ICD-10-CM

## 2019-01-18 DIAGNOSIS — I10 HYPERTENSION, UNSPECIFIED TYPE: Primary | ICD-10-CM

## 2019-01-18 DIAGNOSIS — G47.33 OBSTRUCTIVE SLEEP APNEA SYNDROME: ICD-10-CM

## 2019-01-18 RX ORDER — TRAZODONE HYDROCHLORIDE 50 MG/1
TABLET ORAL
Refills: 0 | COMMUNITY
Start: 2019-01-08 | End: 2020-08-06

## 2019-01-18 NOTE — PROGRESS NOTES
Margarita Mohamud is a 67-year-old female who was first assessed for syncope and found to have hypertrophic cardiomyopathy. Still short of breath. She experiences dyspnea even sitting still. She is getting paroxysms at rest with broad variation between comfort and respiratory distress. Episodes wax and wane and can last for hours. She has effort dyspnea as well which is quite predictable, stopping after 1-2 flights of steps in her building. No palpitations but some right inframammary crease pain with the dyspnea lately. She separately has palpitations at times but not consistently associated with the dyspnea. Prior echo:    IMPRESSIONS:  Significant concentric LVH with increased myocardial mass and unusually  high ejection fraction, but with surprisingly litle diastolic impairment. Moderate elevation of PA pressure. SUMMARY:  Left ventricle: The cavity was small. Systolic function was hyperdynamic  by visual assessment. Ejection fraction was estimated to be 65 % to 70 %. There were no regional wall motion abnormalities. Wall thickness was  moderately to markedly increased. There was moderate concentric  hypertrophy. There was no asymmetric hypertrophy. Mass was definitely  increased. Features were consistent with a pseudonormal left ventricular  filling pattern, with concomitant abnormal relaxation and increased  filling pressure (grade 2 diastolic dysfunction). Ventricular septum: Thickness was markedly increased. Right ventricle: The ventricle was mildly dilated. Wall thickness was  mildly increased. Systolic pressure was moderately to markedly increased. Estimated peak pressure was 45 mmHg. Left atrium: The atrium was mildly to moderately dilated. Cardiac event recorder:    January 12, 2018:  This is a preliminary report. Eder Fonseca have been a total of 12 transmissions by the patient so far. Alayna Joe all demonstrate sinus rhythm with a relatively high voltage QRS and with variable ST segment depression and T-wave inversion reminiscent of left ventricular hypertrophy.  The findings could suggest ischemia.  Symptoms related to the findings include shortness of breath and episodic chest pain.  A complaint of racing heart is associated with sinus tachycardia at 1 16/min with average 1 mm ST segment depression.  Monitoring will continue until February 7, 2018. Monitoring was continued until February 8, 2018. Symptom events included chest pain, racing heart, and shortness of breath. Several automated recordings demonstrated sinus tachycardia. Most of the patient complaints coincide with sinus tachycardia. There is variable T wave inversion unrelated to heart rate. There is infrequent episodic complex ventricular ectopy with occasional uniform pairs and triplets. The longest run was 5 uniform beats, presenting as a pair then a triplet with a single nonconducted P wave ib between. Impression:    Inconsistent connection between subjective symptoms and arrhythmia.                          Complex ventricular ectopy. Sleep:  Her daughter describes heavy breathing and struggling during sleep. Also episodes of choking and occasional apparent respiratory arrest. Palate is class IV with moderate macroglossia and a 15 mm torus. In the midline. Otherwise on physical exam blood pressure is 140/86. Room air oxygen saturation when awake is 94%. She weighs 190 pounds. She is 5 feet 2 inches tall. Body mass index is 34.75. Lungs are clear to auscultation. Cardiac auscultation shows regular rhythm with pronounced S4 gallop, soft systolic murmur lower left sternal border. Peripheral pulses are normal.  There is trivial ankle edema with no sign of DVT. Abdominal examination was not completed. Jugular veins are flat in a seated position, there are no carotid or renal bruits. Twelve-lead EKG performed today shows sinus rhythm with findings for LVH.     Lab work as of September 2018 showed normal electrolytes with mild metabolic acidosis, normal creatinine, normal hepatic panel. Hemogram was largely unremarkable. Impression: High probability of obstructive sleep apnea    Premature appearance of concentric left ventricular hypertrophy could be related to the sleep apnea    Hypertension is controlled and there have been no further episodes of syncope    Plan:  She definitely needs a sleep medicine evaluation    Cardiac MRI to better assess the hypertrophy.   This will be in [facility]    No change in existing medications    Follow-up as soon as these results are available    Valentín Avilez MD [facility]

## 2019-01-18 NOTE — PATIENT INSTRUCTIONS
I have ordered a sleep study to be done at home. Crystal will help you get scheduled. If you have a sleep disorder it could be the root cause of most of your problems. If the home study is abnormal, there will need to be further testing in a hospital lab to decide the exact equipment settings you need.     I have reordered the cardiac MRI to be done in [facility]    Please continue the same medications Patient received Prolia 60 mg injection sub Q.  Tolerated well and left in NAD.

## 2019-01-18 NOTE — PROGRESS NOTES
Chief Complaint   Patient presents with    Hypertension    Cardiomyopathy     1. Have you been to the ER, urgent care clinic since your last visit? Hospitalized since your last visit? No    2. Have you seen or consulted any other health care providers outside of the [address] [place] [address] since your last visit? Include any pap smears or colon screening. Yes When: November,  Where: [facility] Reason for visit: Follow up.

## 2019-01-31 DIAGNOSIS — R07.9 CHEST PAIN, UNSPECIFIED TYPE: ICD-10-CM

## 2019-01-31 DIAGNOSIS — I51.7 LEFT VENTRICULAR HYPERTROPHY: ICD-10-CM

## 2019-01-31 DIAGNOSIS — I10 ESSENTIAL HYPERTENSION: ICD-10-CM

## 2019-01-31 DIAGNOSIS — R06.02 SOB (SHORTNESS OF BREATH): ICD-10-CM

## 2019-01-31 RX ORDER — CLONIDINE HYDROCHLORIDE 0.2 MG/1
0.2 TABLET ORAL
Refills: 0 | Status: CANCELLED | OUTPATIENT
Start: 2019-01-31

## 2019-01-31 RX ORDER — LOSARTAN POTASSIUM 100 MG/1
100 TABLET ORAL DAILY
Qty: 90 TAB | Refills: 1 | Status: CANCELLED | OUTPATIENT
Start: 2019-01-31

## 2019-01-31 RX ORDER — AMLODIPINE BESYLATE 10 MG/1
10 TABLET ORAL DAILY
Qty: 90 TAB | Refills: 1 | Status: CANCELLED | OUTPATIENT
Start: 2019-01-31

## 2019-01-31 RX ORDER — LABETALOL 300 MG/1
300 TABLET, FILM COATED ORAL 2 TIMES DAILY
Qty: 180 TAB | Refills: 1 | Status: CANCELLED | OUTPATIENT
Start: 2019-01-31

## 2019-03-01 ENCOUNTER — DOCUMENTATION ONLY (OUTPATIENT)
Dept: CARDIOLOGY CLINIC | Age: 39
End: 2019-03-01

## 2019-03-01 NOTE — PROGRESS NOTES
We have received a form requesting medical clearance for plasma donation from [facility]. They are specifically requesting comment regarding stability for plasma donation because of multidrug treatment for hypertension. Alysha Santos has hypertrophic cardiomyopathy necessitating careful management of hypertension with avoidance of manipulation of plasma volume. She also has a history of a behavioral health disorder and cannabis exposure along with intermittent alcohol abuse. She has been evaluated both by general cardiology and by electrophysiology for syncope. She has a family history of cardiac sudden death in a brother at age 44. Cardiac event recording has demonstrated nonsustained ventricular tachycardia. Perfusion testing did not demonstrate any sign of ischemic heart disease. It was recommended by electrophysiology Rose Gaviria MD) that she have a cardiac MRI to determine risk of sudden death in her case. The MRI has not been completed yet. In summary, I believe Ms. Sherwin Watt is a poor candidate for plasma donation because shifts in plasma volume may make her prone to syncope. What she does need to do is move ahead and obtain the cardiac MRI so that we can better assess and address her risk.     Bernadette Morales MD [facility]

## 2019-03-07 ENCOUNTER — TELEPHONE (OUTPATIENT)
Dept: CARDIOLOGY CLINIC | Age: 39
End: 2019-03-07

## 2019-03-07 NOTE — TELEPHONE ENCOUNTER
I called and spoke with the patient. Two person ID done. I informed her that we received the [employer] document. In order for us to release information to them we need you to come in and sign a release of information form allowing us to release information to them. She acknowledged understanding and thanked me for the call.

## 2019-04-14 DIAGNOSIS — I10 ESSENTIAL HYPERTENSION: ICD-10-CM

## 2019-04-15 RX ORDER — AMLODIPINE BESYLATE 10 MG/1
TABLET ORAL
Qty: 30 TAB | Refills: 6 | Status: SHIPPED | OUTPATIENT
Start: 2019-04-15 | End: 2019-05-17 | Stop reason: SDUPTHER

## 2019-05-17 ENCOUNTER — OFFICE VISIT (OUTPATIENT)
Dept: CARDIOLOGY CLINIC | Age: 39
End: 2019-05-17

## 2019-05-17 ENCOUNTER — HOSPITAL ENCOUNTER (EMERGENCY)
Age: 39
Discharge: HOME | End: 2019-05-17
Attending: EMERGENCY MEDICINE
Payer: MEDICARE

## 2019-05-17 VITALS
WEIGHT: 188 LBS | OXYGEN SATURATION: 99 % | HEART RATE: 61 BPM | TEMPERATURE: 97.9 F | SYSTOLIC BLOOD PRESSURE: 135 MMHG | RESPIRATION RATE: 16 BRPM | BODY MASS INDEX: 35.5 KG/M2 | DIASTOLIC BLOOD PRESSURE: 81 MMHG | HEIGHT: 61 IN

## 2019-05-17 VITALS
BODY MASS INDEX: 34.6 KG/M2 | HEIGHT: 62 IN | WEIGHT: 188 LBS | SYSTOLIC BLOOD PRESSURE: 134 MMHG | RESPIRATION RATE: 18 BRPM | HEART RATE: 69 BPM | DIASTOLIC BLOOD PRESSURE: 85 MMHG | OXYGEN SATURATION: 98 %

## 2019-05-17 DIAGNOSIS — I10 ESSENTIAL HYPERTENSION: Primary | ICD-10-CM

## 2019-05-17 DIAGNOSIS — R10.13 ABDOMINAL PAIN, EPIGASTRIC: ICD-10-CM

## 2019-05-17 DIAGNOSIS — I42.2 HYPERTROPHIC CARDIOMYOPATHY (HCC): ICD-10-CM

## 2019-05-17 DIAGNOSIS — K59.00 CONSTIPATION, UNSPECIFIED CONSTIPATION TYPE: Primary | ICD-10-CM

## 2019-05-17 DIAGNOSIS — R07.9 CHEST PAIN, UNSPECIFIED TYPE: ICD-10-CM

## 2019-05-17 DIAGNOSIS — I51.7 LEFT VENTRICULAR HYPERTROPHY: ICD-10-CM

## 2019-05-17 PROCEDURE — 99282 EMERGENCY DEPT VISIT SF MDM: CPT

## 2019-05-17 RX ORDER — LANOLIN ALCOHOL/MO/W.PET/CERES
CREAM (GRAM) TOPICAL
Refills: 0 | COMMUNITY
Start: 2019-04-22 | End: 2020-08-06

## 2019-05-17 RX ORDER — DOCUSATE SODIUM 100 MG/1
100 CAPSULE, LIQUID FILLED ORAL 2 TIMES DAILY
Qty: 60 CAP | Refills: 2 | Status: SHIPPED | OUTPATIENT
Start: 2019-05-17 | End: 2019-08-15

## 2019-05-17 RX ORDER — LABETALOL 300 MG/1
300 TABLET, FILM COATED ORAL 2 TIMES DAILY
Qty: 60 TAB | Refills: 6 | Status: SHIPPED | OUTPATIENT
Start: 2019-05-17 | End: 2020-04-12

## 2019-05-17 RX ORDER — DICLOFENAC SODIUM 75 MG/1
TABLET, DELAYED RELEASE ORAL
Refills: 0 | COMMUNITY
Start: 2019-03-20 | End: 2020-08-06

## 2019-05-17 RX ORDER — SPIRONOLACTONE 25 MG/1
25 TABLET ORAL DAILY
Qty: 30 TAB | Refills: 6 | Status: SHIPPED | OUTPATIENT
Start: 2019-05-17 | End: 2020-08-06 | Stop reason: SDUPTHER

## 2019-05-17 RX ORDER — AMLODIPINE BESYLATE 10 MG/1
10 TABLET ORAL DAILY
Qty: 30 TAB | Refills: 6 | Status: SHIPPED | OUTPATIENT
Start: 2019-05-17 | End: 2022-06-21 | Stop reason: SDUPTHER

## 2019-05-17 NOTE — DISCHARGE INSTRUCTIONS
Patient Education        Abdominal Pain: Care Instructions  Your Care Instructions    Abdominal pain has many possible causes. Some aren't serious and get better on their own in a few days. Others need more testing and treatment. If your pain continues or gets worse, you need to be rechecked and may need more tests to find out what is wrong. You may need surgery to correct the problem. Don't ignore new symptoms, such as fever, nausea and vomiting, urination problems, pain that gets worse, and dizziness. These may be signs of a more serious problem. Your doctor may have recommended a follow-up visit in the next 8 to 12 hours. If you are not getting better, you may need more tests or treatment. The doctor has checked you carefully, but problems can develop later. If you notice any problems or new symptoms, get medical treatment right away. Follow-up care is a key part of your treatment and safety. Be sure to make and go to all appointments, and call your doctor if you are having problems. It's also a good idea to know your test results and keep a list of the medicines you take. How can you care for yourself at home? · Rest until you feel better. · To prevent dehydration, drink plenty of fluids, enough so that your urine is light yellow or clear like water. Choose water and other caffeine-free clear liquids until you feel better. If you have kidney, heart, or liver disease and have to limit fluids, talk with your doctor before you increase the amount of fluids you drink. · If your stomach is upset, eat mild foods, such as rice, dry toast or crackers, bananas, and applesauce. Try eating several small meals instead of two or three large ones. · Wait until 48 hours after all symptoms have gone away before you have spicy foods, alcohol, and drinks that contain caffeine. · Do not eat foods that are high in fat. · Avoid anti-inflammatory medicines such as aspirin, ibuprofen (Advil, Motrin), and naproxen (Aleve). These can cause stomach upset. Talk to your doctor if you take daily aspirin for another health problem. When should you call for help? Call 911 anytime you think you may need emergency care. For example, call if:    · You passed out (lost consciousness).     · You pass maroon or very bloody stools.     · You vomit blood or what looks like coffee grounds.     · You have new, severe belly pain.    Call your doctor now or seek immediate medical care if:    · Your pain gets worse, especially if it becomes focused in one area of your belly.     · You have a new or higher fever.     · Your stools are black and look like tar, or they have streaks of blood.     · You have unexpected vaginal bleeding.     · You have symptoms of a urinary tract infection. These may include:  ? Pain when you urinate. ? Urinating more often than usual.  ? Blood in your urine.     · You are dizzy or lightheaded, or you feel like you may faint.    Watch closely for changes in your health, and be sure to contact your doctor if:    · You are not getting better after 1 day (24 hours). Where can you learn more? Go to http://rachel-velma.info/. Enter O039 in the search box to learn more about \"Abdominal Pain: Care Instructions. \"  Current as of: September 23, 2018  Content Version: 11.9  © 9226-9409 [employer]. Care instructions adapted under license by [employer] (which disclaims liability or warranty for this information). If you have questions about a medical condition or this instruction, always ask your healthcare professional. [place] [address] any warranty or liability for your use of this information. Patient Education        Constipation: Care Instructions  Your Care Instructions    Constipation means that you have a hard time passing stools (bowel movements). People pass stools from 3 times a day to once every 3 days. What is normal for you may be different. Constipation may occur with pain in the rectum and cramping. The pain may get worse when you try to pass stools. Sometimes there are small amounts of bright red blood on toilet paper or the surface of stools. This is because of enlarged veins near the rectum (hemorrhoids). A few changes in your diet and lifestyle may help you avoid ongoing constipation. Your doctor may also prescribe medicine to help loosen your stool. Some medicines can cause constipation. These include pain medicines and antidepressants. Tell your doctor about all the medicines you take. Your doctor may want to make a medicine change to ease your symptoms. Follow-up care is a key part of your treatment and safety. Be sure to make and go to all appointments, and call your doctor if you are having problems. It's also a good idea to know your test results and keep a list of the medicines you take. How can you care for yourself at home? · Drink plenty of fluids, enough so that your urine is light yellow or clear like water. If you have kidney, heart, or liver disease and have to limit fluids, talk with your doctor before you increase the amount of fluids you drink. · Include high-fiber foods in your diet each day. These include fruits, vegetables, beans, and whole grains. · Get at least 30 minutes of exercise on most days of the week. Walking is a good choice. You also may want to do other activities, such as running, swimming, cycling, or playing tennis or team sports. · Take a fiber supplement, such as Citrucel or Metamucil, every day. Read and follow all instructions on the label. · Schedule time each day for a bowel movement. A daily routine may help. Take your time having your bowel movement. · Support your feet with a small step stool when you sit on the toilet. This helps flex your hips and places your pelvis in a squatting position. · Your doctor may recommend an over-the-counter laxative to relieve your constipation.  Examples are Milk of Magnesia and MiraLax. Read and follow all instructions on the label. Do not use laxatives on a long-term basis. When should you call for help? Call your doctor now or seek immediate medical care if:    · You have new or worse belly pain.     · You have new or worse nausea or vomiting.     · You have blood in your stools.    Watch closely for changes in your health, and be sure to contact your doctor if:    · Your constipation is getting worse.     · You do not get better as expected. Where can you learn more? Go to http://rachel-velma.info/. Enter 21  in the search box to learn more about \"Constipation: Care Instructions. \"  Current as of: September 23, 2018  Content Version: 11.9  © 2900-4518 [employer]. Care instructions adapted under license by [employer] (which disclaims liability or warranty for this information). If you have questions about a medical condition or this instruction, always ask your healthcare professional. [place] [address] any warranty or liability for your use of this information. Patient Education        Hernia: Care Instructions  Your Care Instructions    A hernia develops when tissue bulges through a weak spot in the wall of your belly. The groin area and the navel are common areas for a hernia. A hernia can also develop near the area of a surgery you had before. Pressure from lifting, straining, or coughing can tear the weak area, causing the hernia to bulge and be painful. If you cannot push a hernia back into place, the tissue may become trapped outside the belly wall. If the hernia gets twisted and loses its blood supply, it will swell and die. This is called a strangulated hernia. It usually causes a lot of pain. It needs treatment right away. Some hernias need to be repaired to prevent a strangulated hernia. If your hernia causes symptoms or is large, you may need surgery.   Follow-up care is a key part of your treatment and safety. Be sure to make and go to all appointments, and call your doctor if you are having problems. It's also a good idea to know your test results and keep a list of the medicines you take. How can you care for yourself at home? · Take care when lifting heavy objects. · Stay at a healthy weight. · Do not smoke. Smoking can cause coughing, which can cause your hernia to bulge. If you need help quitting, talk to your doctor about stop-smoking programs and medicines. These can increase your chances of quitting for good. · Talk with your doctor before wearing a corset or truss for a hernia. These devices are not recommended for treating hernias and sometimes can do more harm than good. There may be certain situations when your doctor thinks a truss would work, but these are rare. When should you call for help? Call your doctor now or seek immediate medical care if:    · You have new or worse belly pain.     · You are vomiting.     · You cannot pass stools or gas.     · You cannot push the hernia back into place with gentle pressure when you are lying down.     · The area over the hernia turns red or becomes tender.    Watch closely for changes in your health, and be sure to contact your doctor if you have any problems. Where can you learn more? Go to http://rachel-velma.info/. Enter C129 in the search box to learn more about \"Hernia: Care Instructions. \"  Current as of: March 27, 2018  Content Version: 11.9  © 4397-7750 [employer] Care instructions adapted under license by [employer] (which disclaims liability or warranty for this information). If you have questions about a medical condition or this instruction, always ask your healthcare professional. [place] [address] any warranty or liability for your use of this information.        Patient Education        Hiatal Hernia: Care Instructions  Your Care Instructions  A hiatal hernia occurs when part of the stomach bulges into the chest cavity. A hiatal hernia may allow stomach acid and juices to back up into the esophagus (acid reflux). This can cause a feeling of burning, warmth, heat, or pain behind the breastbone. This feeling may often occur after you eat, soon after you lie down, or when you bend forward, and it may come and go. You also may have a sour taste in your mouth. These symptoms are commonly known as heartburn or reflux. But not all hiatal hernias cause symptoms. Follow-up care is a key part of your treatment and safety. Be sure to make and go to all appointments, and call your doctor if you are having problems. It's also a good idea to know your test results and keep a list of the medicines you take. How can you care for yourself at home? · Take your medicines exactly as prescribed. Call your doctor if you think you are having a problem with your medicine. · Do not take aspirin or other nonsteroidal anti-inflammatory drugs (NSAIDs), such as ibuprofen (Advil, Motrin) or naproxen (Aleve), unless your doctor says it is okay. Ask your doctor what you can take for pain. · Your doctor may recommend over-the-counter medicine. For mild or occasional indigestion, antacids such as Tums, Gaviscon, Maalox, or Mylanta may help. Your doctor also may recommend over-the-counter acid reducers, such as famotidine (Pepcid AC), cimetidine (Tagamet HB), ranitidine (Zantac 75 and Zantac 150), or omeprazole (Prilosec). Read and follow all instructions on the label. If you use these medicines often, talk with your doctor. · Change your eating habits. ? It's best to eat several small meals instead of two or three large meals. ? After you eat, wait 2 to 3 hours before you lie down. Late-night snacks aren't a good idea. ? Chocolate, mint, and alcohol can make heartburn worse. They relax the valve between the esophagus and the stomach. ?  Spicy foods, foods that have a lot of acid (like tomatoes and oranges), and coffee can make heartburn symptoms worse in some people. If your symptoms are worse after you eat a certain food, you may want to stop eating that food to see if your symptoms get better. · Do not smoke or chew tobacco.  · If you get heartburn at night, raise the head of your bed 6 to 8 inches by putting the frame on blocks or placing a foam wedge under the head of your mattress. (Adding extra pillows does not work.)  · Do not wear tight clothing around your middle. · Lose weight if you need to. Losing just 5 to 10 pounds can help. When should you call for help? Call your doctor now or seek immediate medical care if:    · You have new or worse belly pain.     · You are vomiting.    Watch closely for changes in your health, and be sure to contact your doctor if:    · You have new or worse symptoms of indigestion.     · You have trouble or pain swallowing.     · You are losing weight.     · You do not get better as expected. Where can you learn more? Go to http://rachel-velma.info/. Enter U917 in the search box to learn more about \"Hiatal Hernia: Care Instructions. \"  Current as of: March 27, 2018  Content Version: 11.9  © 3456-6824 [employer]. Care instructions adapted under license by [employer] (which disclaims liability or warranty for this information). If you have questions about a medical condition or this instruction, always ask your healthcare professional. [place] [address] any warranty or liability for your use of this information. Patient Education        Umbilical Hernia: Care Instructions  Overview    An umbilical hernia is a bulge near the belly button, or navel. Intestines or other tissues may bulge through an opening or a weak spot in the stomach muscles. The hernia has a sac that may hold some intestine, fat, or fluid. Many umbilical hernias are caused by pressure near the belly button. Pressure may come from increased weight, repeated straining, or pregnancy. A very small hernia may not cause problems. But your doctor may recommend repairing the muscle. This helps you avoid the risk that the hernia might trap some of the tissues or intestine. This could be an emergency. Follow-up care is a key part of your treatment and safety. Be sure to make and go to all appointments, and call your doctor if you are having problems. It's also a good idea to know your test results and keep a list of the medicines you take. How can you care for yourself at home? · Watch for any signs that the hernia may be causing problems. Your belly may get bigger, and the skin over the hernia may look red. You may have pain or feel bulging or pressure from the hernia. Call your doctor right away if you see these signs. When should you call for help? Call your doctor now or seek immediate medical care if:    · You have new or worse belly pain.     · You vomit.     · You cannot pass stool or gas.     · You can't push the hernia back into place with gentle pressure when you are lying down.     · The area over the hernia turns red or becomes tender.    Watch closely for changes in your health, and be sure to contact your doctor if you have any problems. Current as of: March 27, 2018  Content Version: 11.9  © 7833-6134 [employer]. Care instructions adapted under license by [employer] (which disclaims liability or warranty for this information). If you have questions about a medical condition or this instruction, always ask your healthcare professional. [place] [address] any warranty or liability for your use of this information.

## 2019-05-17 NOTE — ED NOTES
Patient states she is here today with c/o abdominal pain- upper abdomen. She states she knows what the problem is. She states she has a hernia and it has been giving her problems recently. She states she was diagnosed with the hernia at [facility] and they referred her to a GI doctor but she never went and lost the information.

## 2019-05-17 NOTE — ED TRIAGE NOTES
Here for evaluation of possible hernia. States abdominal distention all winter and just came from cardiologist who stated she needed to get it evaluated because it is affecting her breathing. Denies current pain.

## 2019-05-17 NOTE — PATIENT INSTRUCTIONS
Please proceed with the GI work up in [address] [place] [address]. I have renewed your heart medicines with no change.

## 2019-05-17 NOTE — ED PROVIDER NOTES
EMERGENCY DEPARTMENT HISTORY AND PHYSICAL EXAM 
 
 
Date: 5/17/2019 Patient Name: Essence Goddard 
 
History of Presenting Illness Chief Complaint Patient presents with  Abdominal Pain History Provided By: Patient HPI: Essence Goddard, 46 y.o. female with medical history significant for coronary artery disease, CHF, hypertension, and depression who presents from the cardiology office to the ED with cc of abdominal hernia. Patient states she has had this hernia for the past few months and it is gradually getting worse. She states it is affecting her breathing and causing her nonradiating, epigastric abdominal pain. She states she was seen at [address] [place] [address] emergency department for this and diagnosed with a hernia, but she cannot remember what kind. She followed up with her primary care doctor, Sonia Kong, who states he needed the paperwork from [facility] to refer her to a specialist.  She was supposed to see a gastroenterologist, but has not done that yet. She states her hernia occurred right after her tubal ligation and her pain is worse when she lifts heavy objects or bends over. PMHx: CAD, CHF, hypertension, depression PSHx: BTL Social Hx: 1/4 pack/day smoker, occasional alcohol use, denies illegal drug use PCP: Geraldo Santoyo MD 
 
There are no other complaints, changes, or physical findings at this time. No current facility-administered medications on file prior to encounter. Current Outpatient Medications on File Prior to Encounter Medication Sig Dispense Refill  ferrous sulfate 325 mg (65 mg iron) tablet take 1 tablet by mouth two to three times a day  0  
 diclofenac EC (VOLTAREN) 75 mg EC tablet take 1 tablet by mouth twice a day  0  
 amLODIPine (NORVASC) 10 mg tablet Take 1 Tab by mouth daily. 30 Tab 6  
 labetalol (NORMODYNE) 300 mg tablet Take 1 Tab by mouth two (2) times a day. Indications: high blood pressure 60 Tab 6  spironolactone (ALDACTONE) 25 mg tablet Take 1 Tab by mouth daily. 30 Tab 6  [DISCONTINUED] amLODIPine (NORVASC) 10 mg tablet take 1 tablet by mouth once daily 30 Tab 6  
 traZODone (DESYREL) 50 mg tablet   0  
 cloNIDine HCl (CATAPRES) 0.2 mg tablet   0  
 [DISCONTINUED] labetalol (NORMODYNE) 300 mg tablet Take 1 Tab by mouth two (2) times a day. Indications: hypertension 60 Tab 6  
 losartan (COZAAR) 100 mg tablet take 1 tablet by mouth once daily hypertension 30 Tab 6  [DISCONTINUED] spironolactone (ALDACTONE) 25 mg tablet Take 1 Tab by mouth daily. 30 Tab 6  DEBLITANE 0.35 mg tab   1  
 FLUoxetine (PROZAC) 20 mg capsule take 1 capsule by mouth once daily for depression and anxiety  0  
 divalproex DR (DEPAKOTE) 500 mg tablet take 1 tablet by mouth twice a day for ANGER AND MOOD STABILITY  0  
 CHOLECALCIFEROL, VITAMIN D3, (VITAMIN D3 PO) Take  by mouth daily.  valACYclovir (VALTREX) 500 mg tablet   1  
 fluticasone-salmeterol (ADVAIR DISKUS) 100-50 mcg/dose diskus inhaler Take 1 Puff by inhalation every twelve (12) hours.  omega-3 fatty acids-vitamin e (FISH OIL) 1,000 mg cap Take 1 Cap by mouth daily.  aspirin delayed-release 81 mg tablet Take  by mouth daily.  PROAIR HFA 90 mcg/actuation inhaler inhale 2 puffs by mouth every 4 hours  0 Past History Past Medical History: 
Past Medical History:  
Diagnosis Date  Abscess 7/11/2016  CAD (coronary artery disease)   
 enlarged heart  Delivery normal   
 Hypertension  Psychiatric disorder   
 depresssion  Tubal ligation status 12/10/15 Past Surgical History: 
Past Surgical History:  
Procedure Laterality Date  HX GYN    
 tubal ligation  HX ORTHOPAEDIC    
 tubal pregnancy removal  
 HX OTHER SURGICAL    
 abscesses Family History: 
History reviewed. No pertinent family history. Social History: 
Social History Tobacco Use  Smoking status: Current Some Day Smoker Packs/day: 0.25  Smokeless tobacco: Never Used Substance Use Topics  Alcohol use: Yes Comment: socially  Drug use: No  
 
Allergies: Allergies Allergen Reactions  Other Food Anaphylaxis Old Bey seasoning  Iodine Itching  Shellfish Containing Products Itching Review of Systems Review of Systems Constitutional: Negative for chills and fever. HENT: Negative for congestion, rhinorrhea, sneezing and sore throat. Respiratory: Negative for shortness of breath. Cardiovascular: Negative for chest pain. Gastrointestinal: Positive for abdominal pain and constipation. Negative for nausea and vomiting. Musculoskeletal: Negative for back pain, myalgias and neck stiffness. Skin: Negative for rash. Neurological: Negative for dizziness, weakness and headaches. All other systems reviewed and are negative. Physical Exam  
Physical Exam  
Constitutional: She is oriented to person, place, and time. She appears well-developed and well-nourished. Overweight HENT:  
Head: Normocephalic and atraumatic. Mouth/Throat: Oropharynx is clear and moist.  
Eyes: Conjunctivae and EOM are normal.  
Neck: Normal range of motion and full passive range of motion without pain. Neck supple. Cardiovascular: Normal rate, regular rhythm, S1 normal, S2 normal, intact distal pulses and normal pulses. Murmur heard. Systolic murmur is present. Pulmonary/Chest: Effort normal and breath sounds normal. No respiratory distress. She has no wheezes. Abdominal: Soft. Normal appearance and bowel sounds are normal. She exhibits no distension. There is tenderness in the epigastric area. There is no rebound. No palpable hernia appreciated while patient was supine or standing, but she does have some epigastric firmness. Musculoskeletal: Normal range of motion. Neurological: She is alert and oriented to person, place, and time. She has normal strength. Skin: Skin is warm, dry and intact. No rash noted. Psychiatric: She has a normal mood and affect. Her speech is normal and behavior is normal. Judgment and thought content normal.  
Nursing note and vitals reviewed. Diagnostic Study Results Labs - No results found for this or any previous visit (from the past 12 hour(s)). Radiologic Studies - No orders to display No results found. Medical Decision Making I am the first provider for this patient. I reviewed the vital signs, available nursing notes, past medical history, past surgical history, family history and social history. Vital Signs-Reviewed the patient's vital signs. Patient Vitals for the past 12 hrs: 
 Temp Pulse Resp BP SpO2  
05/17/19 1218 97.9 °F (36.6 °C) 61 16 135/81 99 % Pulse Oximetry Analysis - 99% on RA Records Reviewed: Nursing Notes and Old Medical Records Provider Notes (Medical Decision Making):  
28-year-old female referred from cardiology clinic for possible internal hernia. No palpable hernia on exam, but states that she was diagnosed with this at [facility].  Discussed with patient that this may be a hiatal hernia versus ventral hernia versus an internal hernia. Will treat with stool softeners and have patient call [facility] to request her medical records and have patient follow-up with general surgery as an outpatient. Patient agrees with this plan. ED Course:  
Initial assessment performed. The patients presenting problems have been discussed, and they are in agreement with the care plan formulated and outlined with them. I have encouraged them to ask questions as they arise throughout their visit. Progress Note:  
Updated pt on all returned results and findings. Discussed the importance of proper follow up as referred below along with return precautions. Pt in agreement with the care plan and expresses agreement with and understanding of all items discussed. Disposition: Discharge Note: The pt is ready for discharge. The pt's signs, symptoms, diagnosis, and discharge instructions have been discussed and pt has conveyed their understanding. The pt is to follow up as recommended or return to ER should their symptoms worsen. Plan has been discussed and pt is in agreement. PLAN: 
1. Current Discharge Medication List  
  
START taking these medications Details  
docusate sodium (COLACE) 100 mg capsule Take 1 Cap by mouth two (2) times a day for 90 days. Qty: 60 Cap, Refills: 2 STOP taking these medications  
  
 metroNIDAZOLE (FLAGYL) 500 mg tablet Comments:  
Reason for Stopping:   
   
 lidocaine (XYLOCAINE) 5 % ointment Comments:  
Reason for Stopping:   
   
 triamcinolone acetonide (KENALOG) 0.1 % ointment Comments:  
Reason for Stoppin.  
Follow-up Information Follow up With Specialties Details Why Contact Info Blaine Madrid MD Family Practice Schedule an appointment as soon as possible for a visit  [address] [place] [address] [address] 
[phone] Figueroa Rodriges MD General Surgery, Breast Surgery, Oncology Schedule an appointment as soon as possible for a visit  [address] [place] [address] 
[phone] [facility] EMERGENCY DEPT Emergency Medicine  As needed, If symptoms worsen [address] Return to ED if worse Diagnosis Clinical Impression: 1. Constipation, unspecified constipation type 2. Abdominal pain, epigastric

## 2019-05-17 NOTE — Clinical Note
Stable hypertrophic (concentric) heart disease with well-controlled hypertension. Strong possibility of sleep apnea.

## 2019-05-17 NOTE — PROGRESS NOTES
Aldair Paniagua is a 46year old lady with overweight, hypertension, and hypertrophic cardiomyopathy. Marolyn Cluster challenged perfusion imaging in February 2018 showed a vasodilator induced improvement in endocardial circulation and apparent cavity size consistent with chronic hypertrophy based endocardial ischemia. In the absence of epicardial CAD. She had a recent ED visit for pain from an \"internal hernia\" with SOB. She was referred to a gastroenterologist. Her last CT in our system was in 2010. She was more recently in [facility]. Not clear if she is felt to need surgery. PMD is [place] [facility]. She is supposed to go back to [facility] today for another CT. Sleep study never contacted. Will retry. Strongly suspected sleep apnea on prior visits. She describes the chest pain that took her to the ED as sharp, burning, lower substernal and epigastric, exacerbated by supine posture, possibly improved by drinking water. It is generally not activity related. She denies any new or unaccustomed dyspnea, lightheadedness, dizziness or exertion intolerance. On exam, she is an overweight adult female appearing less than her stated age. She weighs 183 pounds at a height of 5 feet 2 inches. BMI is 33.47. Blood pressure is 134/85, respiratory rate is 18 without distress, room air SPO2 is 98%. Resting pulse of 69/min and regular. She is in no distress. Cardiac auscultation shows diminished S1, mid to late JOSE, nl S2. There is no audible gallop. There is no diastolic murmur. Peripheral pulses show normal volume and timing. There is trivial ankle edema with no sign of DVT. Abdomen is nontender with normal liver, no spleen, no pulsation, no bruit. Jugular veins are flat, carotids are silent. There is no cervical lymphadenopathy. Oral pharyngeal anatomy is class III to class IV. There is no stridor.     Twelve-lead EKG performed today shows a combination of changes for left ventricular hypertrophy and early repolarization in the medial precordial Leads. Rhythm is sinus, AV conduction is intact. Impression: Hiatus hernia which is most likely the source of chest pain    Concentric nonobstructive hypertrophic cardiomyopathy, chronic LV endocardial ischemia reflected on the EKG as strain. High likelihood of obstructive sleep apnea which can be making diastolic heart disease worse    Plan:  No change in existing cardiac medications, full renewals done today    Asked sleep medicine orders reentered    Patient encouraged to follow-up with gastroenterology in the endoscopy in [facility] as has been planned.   Path with soft

## 2019-05-17 NOTE — ED NOTES
Emergency Department Nursing Plan of Care The Nursing Plan of Care is developed from the Nursing assessment and Emergency Department Attending provider initial evaluation. The plan of care may be reviewed in the ED Provider note. The Plan of Care was developed with the following considerations:  
Patient / Family readiness to learn indicated by:verbalized understanding Persons(s) to be included in education: patient Barriers to Learning/Limitations:No 
 
Signed Dari Lipscomb RN   
5/17/2019   12:51 PM

## 2019-05-17 NOTE — PROGRESS NOTES
Chief Complaint   Patient presents with    Follow-up    Hypertension    Cardiomyopathy     1. Have you been to the ER, urgent care clinic since your last visit? Hospitalized since your last visit? No    2. Have you seen or consulted any other health care providers outside of the [address] [place] [address] since your last visit? Include any pap smears or colon screening.  No

## 2019-05-22 ENCOUNTER — APPOINTMENT (OUTPATIENT)
Dept: CT IMAGING | Age: 39
End: 2019-05-22
Attending: EMERGENCY MEDICINE
Payer: MEDICARE

## 2019-05-22 ENCOUNTER — HOSPITAL ENCOUNTER (EMERGENCY)
Age: 39
Discharge: HOME | End: 2019-05-22
Attending: EMERGENCY MEDICINE | Admitting: EMERGENCY MEDICINE
Payer: MEDICARE

## 2019-05-22 VITALS
DIASTOLIC BLOOD PRESSURE: 82 MMHG | TEMPERATURE: 97.7 F | HEART RATE: 66 BPM | HEIGHT: 61 IN | SYSTOLIC BLOOD PRESSURE: 141 MMHG | OXYGEN SATURATION: 99 % | WEIGHT: 184 LBS | BODY MASS INDEX: 34.74 KG/M2 | RESPIRATION RATE: 18 BRPM

## 2019-05-22 DIAGNOSIS — K44.9 HIATAL HERNIA: Primary | ICD-10-CM

## 2019-05-22 DIAGNOSIS — D25.9 UTERINE LEIOMYOMA, UNSPECIFIED LOCATION: ICD-10-CM

## 2019-05-22 LAB
APPEARANCE UR: ABNORMAL
BACTERIA URNS QL MICRO: NEGATIVE /HPF
BILIRUB UR QL: NEGATIVE
COLOR UR: ABNORMAL
EPITH CASTS URNS QL MICRO: ABNORMAL /LPF
GLUCOSE UR STRIP.AUTO-MCNC: NEGATIVE MG/DL
HCG UR QL: NEGATIVE
HGB UR QL STRIP: ABNORMAL
KETONES UR QL STRIP.AUTO: NEGATIVE MG/DL
LEUKOCYTE ESTERASE UR QL STRIP.AUTO: NEGATIVE
NITRITE UR QL STRIP.AUTO: NEGATIVE
PH UR STRIP: 5.5 [PH] (ref 5–8)
PROT UR STRIP-MCNC: NEGATIVE MG/DL
RBC #/AREA URNS HPF: ABNORMAL /HPF (ref 0–5)
SP GR UR REFRACTOMETRY: 1.03 (ref 1–1.03)
UA: UC IF INDICATED,UAUC: ABNORMAL
UROBILINOGEN UR QL STRIP.AUTO: 0.2 EU/DL (ref 0.2–1)
WBC URNS QL MICRO: ABNORMAL /HPF (ref 0–4)

## 2019-05-22 PROCEDURE — 81025 URINE PREGNANCY TEST: CPT

## 2019-05-22 PROCEDURE — 81001 URINALYSIS AUTO W/SCOPE: CPT

## 2019-05-22 PROCEDURE — 74176 CT ABD & PELVIS W/O CONTRAST: CPT

## 2019-05-22 PROCEDURE — 99283 EMERGENCY DEPT VISIT LOW MDM: CPT

## 2019-05-22 RX ORDER — OMEPRAZOLE 20 MG/1
20 CAPSULE, DELAYED RELEASE ORAL DAILY
Qty: 30 CAP | Refills: 0 | Status: SHIPPED | OUTPATIENT
Start: 2019-05-22 | End: 2020-08-06

## 2019-05-22 NOTE — ED PROVIDER NOTES
EMERGENCY DEPARTMENT HISTORY AND PHYSICAL EXAM      Date: 5/22/2019  Patient Name: Sari Newsome    History of Presenting Illness     Chief Complaint   Patient presents with    Bloated     History Provided By: Patient    HPI: Sari Newsome, 46 y.o. female with past medical history significant for hypertension, cardiomegaly, and depression who presents via private vehicle to the ED with cc of abdominal bloating for the past 3 weeks, intermittent nausea and vomiting for the last week and early satiety. Patient states that she feels increased pressure in her epigastric region. She has been moving her bowels daily and even tried taking a laxative to see if that would help with her bloating. She denies any pain, fever, or diarrhea. She states she has a known hernia and she called and made an appointment with Dr. Nadiya Nichols, but she has not been to that appointment yet. She denies any change to her stool caliber or function. PMHx: Hypertension, cardiomegaly, depression  Surg History: BTL  Social Hx: Social alcohol use, occasional tobacco use when she smokes, denies illegal drug use    PCP: Cely Foster MD   OB/GYN: Leland Ruiz MD  Cardiology: Hudson Christy MD    There are no other complaints, changes, or physical findings at this time. No current facility-administered medications on file prior to encounter. Current Outpatient Medications on File Prior to Encounter   Medication Sig Dispense Refill    ferrous sulfate 325 mg (65 mg iron) tablet take 1 tablet by mouth two to three times a day  0    diclofenac EC (VOLTAREN) 75 mg EC tablet take 1 tablet by mouth twice a day  0    amLODIPine (NORVASC) 10 mg tablet Take 1 Tab by mouth daily. 30 Tab 6    labetalol (NORMODYNE) 300 mg tablet Take 1 Tab by mouth two (2) times a day. Indications: high blood pressure 60 Tab 6    spironolactone (ALDACTONE) 25 mg tablet Take 1 Tab by mouth daily.  30 Tab 6    docusate sodium (COLACE) 100 mg capsule Take 1 Cap by mouth two (2) times a day for 90 days. 60 Cap 2    traZODone (DESYREL) 50 mg tablet   0    cloNIDine HCl (CATAPRES) 0.2 mg tablet   0    losartan (COZAAR) 100 mg tablet take 1 tablet by mouth once daily hypertension 30 Tab 6    FLUoxetine (PROZAC) 20 mg capsule take 1 capsule by mouth once daily for depression and anxiety  0    divalproex DR (DEPAKOTE) 500 mg tablet take 1 tablet by mouth twice a day for ANGER AND MOOD STABILITY  0    CHOLECALCIFEROL, VITAMIN D3, (VITAMIN D3 PO) Take  by mouth daily.  valACYclovir (VALTREX) 500 mg tablet   1    fluticasone-salmeterol (ADVAIR DISKUS) 100-50 mcg/dose diskus inhaler Take 1 Puff by inhalation every twelve (12) hours.  omega-3 fatty acids-vitamin e (FISH OIL) 1,000 mg cap Take 1 Cap by mouth daily.  aspirin delayed-release 81 mg tablet Take  by mouth daily.  PROAIR HFA 90 mcg/actuation inhaler inhale 2 puffs by mouth every 4 hours  0    DEBLITANE 0.35 mg tab   1     Past History     Past Medical History:  Past Medical History:   Diagnosis Date    Abscess 7/11/2016    CAD (coronary artery disease)     enlarged heart    Delivery normal     Hypertension     Psychiatric disorder     depresssion    Tubal ligation status 12/10/15     Past Surgical History:  Past Surgical History:   Procedure Laterality Date    HX GYN      tubal ligation    HX ORTHOPAEDIC      tubal pregnancy removal    HX OTHER SURGICAL      abscesses     Family History:  History reviewed. No pertinent family history. Social History:  Social History     Tobacco Use    Smoking status: Current Some Day Smoker     Packs/day: 0.25    Smokeless tobacco: Never Used    Tobacco comment: only when drinking   Substance Use Topics    Alcohol use: Yes     Comment: socially    Drug use: No     Allergies:   Allergies   Allergen Reactions    Other Food Anaphylaxis     Old Bey seasoning    Iodine Itching    Shellfish Containing Products Itching     Review of Systems   Review of Systems   Constitutional: Negative for chills and fever. HENT: Negative for congestion, rhinorrhea, sneezing and sore throat. Respiratory: Negative for shortness of breath. Cardiovascular: Negative for chest pain. Gastrointestinal: Positive for abdominal distention, nausea and vomiting. Negative for abdominal pain. Musculoskeletal: Negative for back pain, myalgias and neck stiffness. Skin: Negative for rash. Neurological: Negative for dizziness, weakness and headaches. All other systems reviewed and are negative. Physical Exam   Physical Exam   Constitutional: She is oriented to person, place, and time. She appears well-developed and well-nourished. HENT:   Head: Normocephalic and atraumatic. Mouth/Throat: Oropharynx is clear and moist.   Eyes: Conjunctivae and EOM are normal.   Neck: Normal range of motion and full passive range of motion without pain. Neck supple. Cardiovascular: Normal rate, regular rhythm, S1 normal, S2 normal, normal heart sounds, intact distal pulses and normal pulses. No murmur heard. Pulmonary/Chest: Effort normal and breath sounds normal. No respiratory distress. She has no wheezes. Abdominal: Soft. Normal appearance. She exhibits no distension. There is no tenderness. There is no rebound and no guarding. Normal bowel sounds, no obvious distention, no ascites, no abdominal hernia appreciated   Musculoskeletal: Normal range of motion. Neurological: She is alert and oriented to person, place, and time. She has normal strength. Skin: Skin is warm, dry and intact. No rash noted. Psychiatric: She has a normal mood and affect. Her speech is normal and behavior is normal. Judgment and thought content normal.   Nursing note and vitals reviewed.     Diagnostic Study Results   Labs -     Recent Results (from the past 12 hour(s))   URINALYSIS W/ REFLEX CULTURE    Collection Time: 05/22/19 11:33 AM   Result Value Ref Range    Color YELLOW/STRAW      Appearance CLOUDY (A) CLEAR      Specific gravity 1.030 1.003 - 1.030      pH (UA) 5.5 5.0 - 8.0      Protein NEGATIVE  NEG mg/dL    Glucose NEGATIVE  NEG mg/dL    Ketone NEGATIVE  NEG mg/dL    Bilirubin NEGATIVE  NEG      Blood MODERATE (A) NEG      Urobilinogen 0.2 0.2 - 1.0 EU/dL    Nitrites NEGATIVE  NEG      Leukocyte Esterase NEGATIVE  NEG      WBC 0-4 0 - 4 /hpf    RBC 0-5 0 - 5 /hpf    Epithelial cells MODERATE (A) FEW /lpf    Bacteria NEGATIVE  NEG /hpf    UA:UC IF INDICATED CULTURE NOT INDICATED BY UA RESULT CNI     HCG URINE, QL. - POC    Collection Time: 05/22/19 11:33 AM   Result Value Ref Range    Pregnancy test,urine (POC) NEGATIVE  NEG         Radiologic Studies -   CT ABD PELV WO CONT   Final Result       No acute abdominal or pelvic process seen. No evidence for abdominal wall   hernia. No CT complication from pancreatitis. Incidental fibroid uterus, hiatal hernia, lumbosacral facet arthropathy. Ct Abd Pelv Wo Cont    Result Date: 5/22/2019   No acute abdominal or pelvic process seen. No evidence for abdominal wall hernia. No CT complication from pancreatitis. Incidental fibroid uterus, hiatal hernia, lumbosacral facet arthropathy. Medical Decision Making   I am the first provider for this patient. I reviewed the vital signs, available nursing notes, past medical history, past surgical history, family history and social history. Vital Signs-Reviewed the patient's vital signs. Patient Vitals for the past 12 hrs:   Temp Pulse Resp BP SpO2   05/22/19 1102 97.7 °F (36.5 °C) 66 18 141/82 99 %     Pulse Oximetry Analysis - 99% on RA    Records Reviewed: Nursing Notes, Old Medical Records, Previous Radiology Studies and Previous Laboratory Studies    Provider Notes (Medical Decision Making):   29-year-old female presents with 3-week history of abdominal bloating and 1 week of nausea and vomiting as well as early satiety. Doubt patient has pancreatic cancer, but will CT to further assess. Patient denies any fevers, chills, or weight loss. Will not check labs at this time. Patient has a follow-up appointment already scheduled with general surgery. Discussed this plan with patient who agrees. ED Course:   Initial assessment performed. The patients presenting problems have been discussed, and they are in agreement with the care plan formulated and outlined with them. I have encouraged them to ask questions as they arise throughout their visit. Progress Note  1:04 PM  I have re-evaluated pt and discussed her results with her. Symptoms are likely due to her hiatal hernia. We will start her on a PPI and have her follow-up with her primary care doctor and general surgery. If her symptoms do not improve, will refer to GI. Progress Note:   Updated pt on all returned results and findings. Discussed the importance of proper follow up as referred below along with return precautions. Pt in agreement with the care plan and expresses agreement with and understanding of all items discussed. Disposition:  Discharge Note:  The pt is ready for discharge. The pt's signs, symptoms, diagnosis, and discharge instructions have been discussed and pt has conveyed their understanding. The pt is to follow up as recommended or return to ER should their symptoms worsen. Plan has been discussed and pt is in agreement. PLAN:  1. Current Discharge Medication List      START taking these medications    Details   omeprazole (PRILOSEC) 20 mg capsule Take 1 Cap by mouth daily. Qty: 30 Cap, Refills: 0           2.    Follow-up Information     Follow up With Specialties Details Why Contact Info    Meme Thomas MD Family Practice Schedule an appointment as soon as possible for a visit  [address] [place] [address] Dr Santiago Wilburn [facility]      Anup Smart MD General Surgery, Breast Surgery, Oncology  at your scheduled appointment [address] [place] [address] [address]      [facility] EMERGENCY DEPT Emergency Medicine  As needed, If symptoms worsen [address] [facility]        Return to ED if worse     Diagnosis     Clinical Impression:   1. Hiatal hernia    2.  Uterine leiomyoma, unspecified location

## 2019-05-22 NOTE — DISCHARGE INSTRUCTIONS
Patient Education        Hiatal Hernia: Care Instructions  Your Care Instructions  A hiatal hernia occurs when part of the stomach bulges into the chest cavity. A hiatal hernia may allow stomach acid and juices to back up into the esophagus (acid reflux). This can cause a feeling of burning, warmth, heat, or pain behind the breastbone. This feeling may often occur after you eat, soon after you lie down, or when you bend forward, and it may come and go. You also may have a sour taste in your mouth. These symptoms are commonly known as heartburn or reflux. But not all hiatal hernias cause symptoms. Follow-up care is a key part of your treatment and safety. Be sure to make and go to all appointments, and call your doctor if you are having problems. It's also a good idea to know your test results and keep a list of the medicines you take. How can you care for yourself at home? · Take your medicines exactly as prescribed. Call your doctor if you think you are having a problem with your medicine. · Do not take aspirin or other nonsteroidal anti-inflammatory drugs (NSAIDs), such as ibuprofen (Advil, Motrin) or naproxen (Aleve), unless your doctor says it is okay. Ask your doctor what you can take for pain. · Your doctor may recommend over-the-counter medicine. For mild or occasional indigestion, antacids such as Tums, Gaviscon, Maalox, or Mylanta may help. Your doctor also may recommend over-the-counter acid reducers, such as famotidine (Pepcid AC), cimetidine (Tagamet HB), ranitidine (Zantac 75 and Zantac 150), or omeprazole (Prilosec). Read and follow all instructions on the label. If you use these medicines often, talk with your doctor. · Change your eating habits. ? It's best to eat several small meals instead of two or three large meals. ? After you eat, wait 2 to 3 hours before you lie down. Late-night snacks aren't a good idea. ? Chocolate, mint, and alcohol can make heartburn worse.  They relax the valve between the esophagus and the stomach. ? Spicy foods, foods that have a lot of acid (like tomatoes and oranges), and coffee can make heartburn symptoms worse in some people. If your symptoms are worse after you eat a certain food, you may want to stop eating that food to see if your symptoms get better. · Do not smoke or chew tobacco.  · If you get heartburn at night, raise the head of your bed 6 to 8 inches by putting the frame on blocks or placing a foam wedge under the head of your mattress. (Adding extra pillows does not work.)  · Do not wear tight clothing around your middle. · Lose weight if you need to. Losing just 5 to 10 pounds can help. When should you call for help? Call your doctor now or seek immediate medical care if:    · You have new or worse belly pain.     · You are vomiting.    Watch closely for changes in your health, and be sure to contact your doctor if:    · You have new or worse symptoms of indigestion.     · You have trouble or pain swallowing.     · You are losing weight.     · You do not get better as expected. Where can you learn more? Go to http://rachel-velma.info/. Enter E714 in the search box to learn more about \"Hiatal Hernia: Care Instructions. \"  Current as of: March 27, 2018  Content Version: 11.9  © 7607-7344 [employer] [place]. Care instructions adapted under license by [employer] (which disclaims liability or warranty for this information). If you have questions about a medical condition or this instruction, always ask your healthcare professional. [place] [address] any warranty or liability for your use of this information.

## 2019-05-22 NOTE — ED NOTES
Pt given printed discharge instructions and 1 script(s). Pt verbalized understanding of instructions and script(s). Pt verbalized importance of following up with GI. Pt alert and oriented, in no acute distress, ambulatory with self.

## 2019-05-22 NOTE — ED NOTES
Emergency Department Nursing Plan of Care       The Nursing Plan of Care is developed from the Nursing assessment and Emergency Department Attending provider initial evaluation. The plan of care may be reviewed in the ED Provider note.     The Plan of Care was developed with the following considerations:   Patient / Family readiness to learn indicated by:verbalized understanding  Persons(s) to be included in education: patient  Barriers to Learning/Limitations:No    Signed     Brayan Rawls RN    5/22/2019   1:11 PM

## 2019-05-28 ENCOUNTER — HOSPITAL ENCOUNTER (EMERGENCY)
Age: 39
Discharge: HOME | End: 2019-05-28
Attending: EMERGENCY MEDICINE
Payer: MEDICARE

## 2019-05-28 VITALS
SYSTOLIC BLOOD PRESSURE: 135 MMHG | HEART RATE: 76 BPM | HEIGHT: 62 IN | TEMPERATURE: 98.1 F | DIASTOLIC BLOOD PRESSURE: 86 MMHG | RESPIRATION RATE: 18 BRPM | WEIGHT: 188 LBS | BODY MASS INDEX: 34.6 KG/M2 | OXYGEN SATURATION: 99 %

## 2019-05-28 DIAGNOSIS — Z77.29 CARBON MONOXIDE EXPOSURE: Primary | ICD-10-CM

## 2019-05-28 PROCEDURE — 99282 EMERGENCY DEPT VISIT SF MDM: CPT

## 2019-05-28 RX ORDER — TRIAMCINOLONE ACETONIDE 1 MG/G
OINTMENT TOPICAL
Refills: 0 | COMMUNITY
Start: 2019-04-22 | End: 2022-06-21 | Stop reason: SDUPTHER

## 2019-05-28 NOTE — LETTER
[facility] EMERGENCY DEPT 
[address] [facility] [address] [id number] 
[phone] Work/School Note Date: 5/28/2019 To Whom It May concern: 
 
Elsie Julio was seen and treated today in the emergency room by the following provider(s): 
Attending Provider: Britni Arnold MD 
Physician Assistant: Larene Kanner, PA. Elsie Julio may return to work on 5/29/19. Sincerely, ALINE Guzman

## 2019-05-28 NOTE — ED NOTES
ALINE Leyva at bedside reviewing patient's discharge instructions and reviewing medications. Patient ambulatory home with self. Patient in no apparent distress.

## 2019-05-28 NOTE — DISCHARGE INSTRUCTIONS
Patient Education        Exposure to Toxins: Care Instructions  Your Care Instructions    Toxins are poisonous substances that can harm your body. If your doctor is concerned that your symptoms are caused by exposure to a toxic substance, he or she may ask you about your home, your work, your family, and other aspects of your environment. You also may have blood tests or X-rays to find out if a toxin is in your body. For example, you may have been around smoke from a fire. Or you may have been around fumes from paints, solvents, or waste products from workshops or factories. But in some cases it may be hard to find out what you may have been exposed to. Sometimes it can take years before you have symptoms. 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 And being exposed to some toxins can make health problems you already have worse. Follow-up care is a key part of your treatment and safety. Be sure to make and go to all appointments, and call your doctor if you are having problems. It's also a good idea to know your test results and keep a list of the medicines you take. How can you care for yourself at home? · If you think you may have been exposed to a toxin but are not sure what it might be, try to keep a written record of your symptoms. Note when and where you have symptoms. And note any possible health hazards. For example, you may find out that you feel sick to your stomach during your workweek, but you feel better on weekends. · It may help to increase the amount of fresh air in your home. · If you can, try to control your exposure to hazardous materials. ? Avoid cigarette smoke. Cigarettes contain many chemicals that are hazardous to your health. ? Keep your home clean and as free from dust as you can. Dust can irritate your lungs. ? Get a carbon monoxide alarm for your home. Carbon monoxide comes from cars, space heaters, and other heat sources.  It can be deadly. ? When you use cleaning or home improvement products, make sure to open windows or use an exhaust fan. Never mix household chemicals, such as chlorine and ammonia. Some mixtures can create toxic fumes that can be deadly. ? Read the label on house and garden chemicals. Be sure to follow all safety directions. Try to limit your use of lawn and garden pesticides. ? Keep in mind that the farther away you are from a hazardous source, the less exposure you will receive. When should you call for help? Call 911 anytime you think you may need emergency care. For example, call if:    · You have trouble breathing.    Call your doctor now or seek immediate medical care if:    · You get household chemicals in your mouth or eyes. Call the [address] [place] [address] ([phone]).     · You think you may have been exposed to a hazardous material.    Watch closely for changes in your health, and be sure to contact your doctor if:    · You do not get better as expected. Where can you learn more? Go to http://rachel-velma.info/. Enter B226 in the search box to learn more about \"Exposure to Toxins: Care Instructions. \"  Current as of: March 28, 2018  Content Version: 11.9  © 5146-9308 [employer]. Care instructions adapted under license by [employer] (which disclaims liability or warranty for this information). If you have questions about a medical condition or this instruction, always ask your healthcare professional. [place] [address] any warranty or liability for your use of this information.
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
## 2019-06-10 ENCOUNTER — OFFICE VISIT (OUTPATIENT)
Dept: SURGERY | Age: 39
End: 2019-06-10

## 2019-06-10 VITALS
BODY MASS INDEX: 33.86 KG/M2 | SYSTOLIC BLOOD PRESSURE: 124 MMHG | WEIGHT: 184 LBS | DIASTOLIC BLOOD PRESSURE: 86 MMHG | HEART RATE: 89 BPM | RESPIRATION RATE: 16 BRPM | TEMPERATURE: 98.9 F | OXYGEN SATURATION: 98 % | HEIGHT: 62 IN

## 2019-06-10 DIAGNOSIS — K44.9 HIATAL HERNIA: Primary | ICD-10-CM

## 2019-06-10 NOTE — PROGRESS NOTES
1. Have you been to the ER, urgent care clinic since your last visit? Hospitalized since your last visit? Yes [facility] ER umbilical hernia    2. Have you seen or consulted any other health care providers outside of the [address] [place] [address] since your last visit? Include any pap smears or colon screening.  No

## 2019-06-10 NOTE — PROGRESS NOTES
HISTORY OF PRESENT ILLNESS  Gloria Gruber is a 46 y.o. female who is referred by the [facility] ER for further evaluation of a hiatal hernia. Ms. Giana Lynn tells me that she was recently seen in the ER at [facility] for evaluation of epigastric abdominal pain. She reports no associated nausea or vomitting. No fevers or chills. No chronic cough or constipation. She has otherwise been in her usual state of health. CT scan abdomen/pelvis without po/IV contrast - 5/28/2019 - No acute abdominal or pelvic process seen. No evidence for abdominal wall hernia. No CT complication from pancreatitis. Incidental fibroid uterus, hiatal hernia, lumbosacral facet arthropathy. Started on Prilosec. Past Medical History:  7/11/2016: Abscess  No date: CAD (coronary artery disease)      Comment:  enlarged heart  No date: Delivery normal  6/10/2019: Hiatal hernia  No date: Hypertension  No date: Psychiatric disorder      Comment:  depresssion  12/10/15: Tubal ligation status    Past Surgical History:  No date: HX GYN      Comment:  tubal ligation  No date: HX ORTHOPAEDIC      Comment:  tubal pregnancy removal  No date: HX OTHER SURGICAL      Comment:  abscesses    Social History    Socioeconomic History      Marital status:       Spouse name: Not on file      Number of children: Not on file      Years of education: Not on file      Highest education level: Not on file    Tobacco Use      Smoking status: Current Some Day Smoker        Packs/day: 0.25      Smokeless tobacco: Never Used      Tobacco comment: only when drinking    Substance and Sexual Activity      Alcohol use: Yes        Comment: socially      Drug use: No      Sexual activity: Never        Partners: Female    Review of systems negative except as noted. Review of Systems   Constitutional: Negative for chills and fever. Gastrointestinal: Positive for abdominal pain. Negative for nausea and vomiting.        Physical Exam   Constitutional: She appears well-developed and well-nourished. No distress. HENT:   Head: Normocephalic and atraumatic. Eyes: No scleral icterus. Neck: Neck supple. Cardiovascular: Normal rate and regular rhythm. Pulmonary/Chest: Effort normal and breath sounds normal.   Abdominal: Soft. She exhibits no distension. There is no tenderness. There is no rebound and no guarding. No hernia. Hernia confirmed negative in the ventral area. No apparent hernia at umbilicus. Musculoskeletal: Normal range of motion. Lymphadenopathy:     She has no cervical adenopathy. Neurological: She is alert. Vitals reviewed. ASSESSMENT and PLAN  Reviewed CT Scan. At this point in time, do not believe that there is an indication for surgical intervention. Prilosec as prescribed. Will see in two more weeks or earlier if need be. Ms. Valdemar Castano may benefit from GI evaluation at some point. She is agreeable to this plan and is most certainly free to contact the office should any questions or concerns arise.      CC: MD Anais Chambers MD

## 2019-06-17 ENCOUNTER — OFFICE VISIT (OUTPATIENT)
Dept: SLEEP MEDICINE | Age: 39
End: 2019-06-17

## 2019-06-17 VITALS
DIASTOLIC BLOOD PRESSURE: 88 MMHG | SYSTOLIC BLOOD PRESSURE: 138 MMHG | OXYGEN SATURATION: 98 % | HEIGHT: 62 IN | BODY MASS INDEX: 33.68 KG/M2 | HEART RATE: 73 BPM | WEIGHT: 183 LBS

## 2019-06-17 DIAGNOSIS — G47.33 OSA (OBSTRUCTIVE SLEEP APNEA): Primary | ICD-10-CM

## 2019-06-17 DIAGNOSIS — G47.00 INSOMNIA, UNSPECIFIED TYPE: ICD-10-CM

## 2019-06-17 NOTE — PATIENT INSTRUCTIONS

## 2019-06-17 NOTE — PROGRESS NOTES
217 [facility]., [facility]. [place], [address]  Tel.  [phone]  Fax. 100 [facility] 60  [place], [address]  Tel.  [phone]  Fax. [phone] [address] [facility] Alayna Robbins   Tel.  [phone]  Fax. [phone]       Chief Complaint       Chief Complaint   Patient presents with    Sleep Problem     NP refd by Dr. Sallie Leslie for sleep consult       HPI      Daisy Ni is 46 y.o. female seen for evaluation of a sleep disorder. She has a history of hypertrophic cardiomyopathy. She notes difficulty with sleep initiation. She states that she is unable to fall asleep until 4: 30-5 AM.  In the past she would normally fall asleep between 2-3 AM.  She states that she will take clonidine for hypertension which also benefits sleep onset. She will normally sleep until 11: 30 a.m./noon. She is often tired on awakening. She notes a history of snoring/choking and coughing. Snoring is loud and heard in different rooms. She notes sleep talking. She denies vivid dreaming or nightmares, sleepwalking, bruxism, nocturnal incontinence, abnormal arm or leg movements, hypnagogic hallucinations, sleep paralysis or cataplexy. The patient has not undergone diagnostic testing for the current problems. [place] Sleepiness Score: 3       Allergies   Allergen Reactions    Other Food Anaphylaxis     Old Bey seasoning    Iodine Itching    Shellfish Containing Products Itching       Current Outpatient Medications   Medication Sig Dispense Refill    triamcinolone acetonide (KENALOG) 0.1 % ointment   0    omeprazole (PRILOSEC) 20 mg capsule Take 1 Cap by mouth daily. 30 Cap 0    ferrous sulfate 325 mg (65 mg iron) tablet take 1 tablet by mouth two to three times a day  0    diclofenac EC (VOLTAREN) 75 mg EC tablet take 1 tablet by mouth twice a day  0    amLODIPine (NORVASC) 10 mg tablet Take 1 Tab by mouth daily.  30 Tab 6    labetalol (NORMODYNE) 300 mg tablet Take 1 Tab by mouth two (2) times a day. Indications: high blood pressure 60 Tab 6    spironolactone (ALDACTONE) 25 mg tablet Take 1 Tab by mouth daily. 30 Tab 6    docusate sodium (COLACE) 100 mg capsule Take 1 Cap by mouth two (2) times a day for 90 days. 60 Cap 2    traZODone (DESYREL) 50 mg tablet   0    cloNIDine HCl (CATAPRES) 0.2 mg tablet   0    losartan (COZAAR) 100 mg tablet take 1 tablet by mouth once daily hypertension 30 Tab 6    DEBLITANE 0.35 mg tab   1    FLUoxetine (PROZAC) 20 mg capsule take 1 capsule by mouth once daily for depression and anxiety  0    divalproex DR (DEPAKOTE) 500 mg tablet take 1 tablet by mouth twice a day for ANGER AND MOOD STABILITY  0    CHOLECALCIFEROL, VITAMIN D3, (VITAMIN D3 PO) Take  by mouth daily.  valACYclovir (VALTREX) 500 mg tablet   1    fluticasone-salmeterol (ADVAIR DISKUS) 100-50 mcg/dose diskus inhaler Take 1 Puff by inhalation every twelve (12) hours.  omega-3 fatty acids-vitamin e (FISH OIL) 1,000 mg cap Take 1 Cap by mouth daily.  aspirin delayed-release 81 mg tablet Take  by mouth daily.  PROAIR HFA 90 mcg/actuation inhaler inhale 2 puffs by mouth every 4 hours  0        She  has a past medical history of Abscess (7/11/2016), CAD (coronary artery disease), Delivery normal, Hiatal hernia (6/10/2019), Hypertension, Psychiatric disorder, and Tubal ligation status (12/10/15). She  has a past surgical history that includes hx orthopaedic; hx gyn; and hx other surgical.    She family history is not on file. She  reports that she has been smoking. She has been smoking about 0.25 packs per day. She has never used smokeless tobacco. She reports that she drinks alcohol. She reports that she does not use drugs. Review of Systems:  Review of Systems   Constitutional: Negative for chills and fever. HENT: Negative for hearing loss and tinnitus. Eyes: Negative for blurred vision and double vision. Respiratory: Negative for cough and shortness of breath. Cardiovascular: Negative for chest pain and palpitations. Gastrointestinal: Negative for abdominal pain and heartburn. Genitourinary: Negative for frequency and urgency. Musculoskeletal: Negative for back pain and neck pain. Skin: Negative for itching and rash. Neurological: Negative for dizziness and headaches. Psychiatric/Behavioral: Negative for depression. The patient is not nervous/anxious. Objective:     Visit Vitals  /88 (BP 1 Location: Left arm, BP Patient Position: Sitting)   Pulse 73   Ht 5' 2\" (1.575 m)   Wt 183 lb (83 kg)   SpO2 98%   BMI 33.47 kg/m²     Body mass index is 33.47 kg/m². General:   Conversant, cooperative   Eyes:  Pupils equal and reactive, no nystagmus   Oropharynx:   Mallampati score III, , tongue scalloped   Tonsils:     Neck:   No carotid bruits; Neck circ. in \"inches\": 15   Chest/Lungs:  Clear on auscultation    CVS:  Normal rate, regular rhythm   Skin:  Warm to touch; no obvious rashes   Neuro:  Speech fluent, face symmetrical, tongue movement normal   Psych:  Normal affect,  normal countenance        Assessment:       ICD-10-CM ICD-9-CM    1. DAVID (obstructive sleep apnea) G47.33 327.23 SLEEP STUDY UNATTENDED, 4 CHANNEL     Potential sleep disordered breathing with snoring, potential apnea. She will be evaluated with a home sleep test.  The results will be reviewed with her. Plan:     Orders Placed This Encounter    SLEEP STUDY UNATTENDED, 4 CHANNEL     Order Specific Question:   Reason for Exam     Answer:   snoring, fatigue       * Patient has a history and examination consistent with the diagnosis of sleep apnea. * She was provided information on sleep apnea including corresponding risk factors and the importance of proper treatment. * Treatment options if indicated were reviewed today. Instructions:  o   o The patient would benefit from weight reduction measures.   o Do not engage in activities requiring a normal degree of alertness if fatigue is present. o The patient understands that untreated or undertreated sleep apnea could impair judgement and the ability to function normally during the day.  o Call or return if symptoms worsen or persist.          Marysol Gil MD, [facility]  Electronically signed 06/17/19       This note was created using voice recognition software. Despite editing, there may be syntax errors. This note will not be viewable in [address]

## 2019-06-18 ENCOUNTER — DOCUMENTATION ONLY (OUTPATIENT)
Dept: SLEEP MEDICINE | Age: 39
End: 2019-06-18

## 2019-06-29 RX ORDER — PREDNISONE 20 MG/1
20 TABLET ORAL AS NEEDED
Refills: 0 | COMMUNITY
Start: 2019-05-31

## 2019-06-29 RX ORDER — ONDANSETRON 4 MG/1
TABLET, ORALLY DISINTEGRATING ORAL
Refills: 0 | COMMUNITY
Start: 2019-05-31 | End: 2020-08-06

## 2019-08-05 ENCOUNTER — TELEPHONE (OUTPATIENT)
Dept: SLEEP MEDICINE | Age: 39
End: 2019-08-05

## 2019-08-05 DIAGNOSIS — G47.33 OSA (OBSTRUCTIVE SLEEP APNEA): Primary | ICD-10-CM

## 2019-08-06 ENCOUNTER — DOCUMENTATION ONLY (OUTPATIENT)
Dept: SLEEP MEDICINE | Age: 39
End: 2019-08-06

## 2019-08-06 NOTE — TELEPHONE ENCOUNTER
Attempted to call and schedule HSAT pickup and education. Phone number on file is not in service. Sent letter.

## 2019-08-16 ENCOUNTER — TELEPHONE (OUTPATIENT)
Dept: CARDIOLOGY CLINIC | Age: 39
End: 2019-08-16

## 2019-08-16 ENCOUNTER — OFFICE VISIT (OUTPATIENT)
Dept: CARDIOLOGY CLINIC | Age: 39
End: 2019-08-16

## 2019-08-16 VITALS
HEIGHT: 62 IN | RESPIRATION RATE: 16 BRPM | WEIGHT: 174 LBS | BODY MASS INDEX: 32.02 KG/M2 | DIASTOLIC BLOOD PRESSURE: 76 MMHG | HEART RATE: 67 BPM | SYSTOLIC BLOOD PRESSURE: 114 MMHG | OXYGEN SATURATION: 99 %

## 2019-08-16 DIAGNOSIS — G47.30 SLEEP APNEA, UNSPECIFIED TYPE: ICD-10-CM

## 2019-08-16 DIAGNOSIS — I42.2 HYPERTROPHIC CARDIOMYOPATHY (HCC): Primary | ICD-10-CM

## 2019-08-16 NOTE — PATIENT INSTRUCTIONS
Please do the second part of the sleep study to determine the exact machine settings you need. Treatment of sleep apnea will make an immense difference in evry other condition you have. You are cleared for anaesthesia and surgery and endoscopy. Please take all of your medications on the day of surgery, and bring them with you. The anesthesiologist needs to know that you have sleep apnea because this will change the way they care for you during and after the procedures.

## 2019-08-16 NOTE — Clinical Note
She has LVH severe enough to cause endocardial ischemia both at rest and with effort. Full treatment of sleep apnea will help. She could benefit from ranolazine.

## 2019-08-16 NOTE — PROGRESS NOTES
Rita Ronquillo is a 50-year-old lady with hypertension, obesity, hypertrophic cardiomyopathy, and intermittent suspicion of ischemic heart disease. Her primary physician is Dr. Tamika Luis  GYN: Latisha Aaron  GI:  Unsure      Occasional sharp chest pain. Right and left parasternal, ICS 2-3. Starts suddenly. Pleuritic component. Touch  changes the pain. Rest onset, no sleep onset. Pharmacy keeps trying to refill losartan which has been discontinued    Her GYN wants to do a surgery to remove fibroids. She also needs endoscopy. Perfusion testing:    IMPRESSION  IMPRESSION:  There is concentric left ventricular hypertrophy. The vasodilator  effect of the Lexiscan results in improved early diastolic function which and improved apparent chamber size. with generally better myocardial tracer uptake. There are no focal abnormalities  consistent with treatable coronary artery disease. She most likely has diffuse endocardial ischemia at rest and with effort which is still capable of responding to capillary vasodilatation. Last echo 2016:      IMPRESSIONS:  Significant concentric LVH with increased myocardial mass and unusually  high ejection fraction, but with surprisingly litle diastolic impairment. Moderate elevation of PA pressure. SUMMARY:  Left ventricle: The cavity was small. Systolic function was hyperdynamic  by visual assessment. Ejection fraction was estimated to be 65 % to 70 %. There were no regional wall motion abnormalities. Wall thickness was  moderately to markedly increased. There was moderate concentric  hypertrophy. There was no asymmetric hypertrophy. Mass was definitely  increased. Features were consistent with a pseudonormal left ventricular  filling pattern, with concomitant abnormal relaxation and increased  filling pressure (grade 2 diastolic dysfunction). Ventricular septum: Thickness was markedly increased. Right ventricle: The ventricle was mildly dilated.  Светлана Jluio thickness was  mildly increased. Systolic pressure was moderately to markedly increased. Estimated peak pressure was 45 mmHg. Left atrium: The atrium was mildly to moderately dilated. Sleep study was positive and she needs a titration. On examination, she weighs 174 pounds. She is 5 feet 2 inches tall. BMI is 31.83. Respiratory rate is 16 without distress, room air saturation is 99%. Blood pressure is 114/76. Lungs are clear, there is no wheezing or prolongation of expiratory phase. Excursion may be minimally reduced relative to abdominal obesity. Jugular veins are flat in a seated position. Oral pharyngeal anatomy is class IV for possible airway compromise. There is no carotid bruit on either side. Cardiac auscultation shows regular rhythm with normal quality S1 and S2.  P2 does not seem to be enhanced, there is no identifiable gallop or murmur. There is no peripheral edema, there is no sign of DVT. Abdomen was not examined. Gyn surgery will be in [facility], so will upper endoscopy. She had her sleep study through them (home study) as well. Twelve-lead EKG performed today shows sinus rhythm, left axis deviation, mild prolongation of left atrial conduction time, minimal mid precordial changes consistent with early repolarization, and partial changes for LVH, absent T wave inversion in lead V6. Lead I has baseline ST depression with downsloping character and T wave biphasic. This is repeated in aVL with reciprocal changes in V1 and V2.     Impression: Concentric nonobstructive hypertrophic cardiomyopathy    Hyperdynamic systolic function with impaired diastolic function    Chronic diffuse endocardial ischemic process unrelated to epicardial CAD    Obstructive sleep apnea awaiting titration    Hypertension    Dysfunctional uterine bleeding in need of hysterectomy    Endoscopy is planned    Plan:  Cleared for anesthesia and planned procedures as long as she takes all of her medications and anesthesia is aware of sleep apnea in terms of perianaesthetic treatment. She might benefit in the future from consideration of the addition of ranolazine to her therapeutic regimen to try to improve resting endocardial blood flow    Hopefully, complete treatment of sleep apnea will prevent worsening of hypertrophic cardiomyopathy and help prevent eventual endocardial fibrosis    Labetalol is the ideal beta-blocker for this condition    She should not have thiazide diuretics ever considered to avoid triggering of the renin-aldosterone-angiotensin cascade.     iDana Stuart MD, [facility]

## 2019-08-16 NOTE — PROGRESS NOTES
Chief Complaint   Patient presents with    Follow-up    Hypertension    Cardiomyopathy     1. Have you been to the ER, urgent care clinic since your last visit? Hospitalized since your last visit? No    2. Have you seen or consulted any other health care providers outside of the [address] [place] [address] since your last visit? Include any pap smears or colon screening.  No

## 2019-10-10 ENCOUNTER — TELEPHONE (OUTPATIENT)
Dept: CARDIOLOGY CLINIC | Age: 39
End: 2019-10-10

## 2019-10-12 RX ORDER — LAMOTRIGINE 25 MG/1
TABLET ORAL
Refills: 0 | COMMUNITY
Start: 2019-10-09 | End: 2020-08-06

## 2019-10-12 RX ORDER — FLUOXETINE HYDROCHLORIDE 40 MG/1
CAPSULE ORAL
Refills: 0 | COMMUNITY
Start: 2019-10-09 | End: 2020-08-06

## 2019-10-12 RX ORDER — CEPHALEXIN 500 MG/1
CAPSULE ORAL
Refills: 0 | COMMUNITY
Start: 2019-09-15 | End: 2022-09-26

## 2019-10-12 NOTE — PROGRESS NOTES
Ms. Sasha Espana is requesting a renewal for clonidine. She has a single non-repeating prescription that was entered in September. It was not prescribed from here and it is unclear what the Sig was supposed to be. If the patient should be advised that this is not a good addition to her hypertensive regimen and that she should come back for a visit for readjustment of hypertensive medications.     Sarah Goldman MD

## 2019-10-14 ENCOUNTER — TELEPHONE (OUTPATIENT)
Dept: CARDIOLOGY CLINIC | Age: 39
End: 2019-10-14

## 2019-10-14 NOTE — TELEPHONE ENCOUNTER
MD Saturnino Isaacs, [facility]   Caller: Unspecified (4 days ago,  3:11 PM)             Rachana Rangel, this lady keeps asking us for clonidine.  I do not think I ever prescribed it for her and it is not a safe drug to be randomly started and stopped. Francisco Giang primary care doctor is Sarahi Martin would like her to either see him or see me again to try to straighten out what her blood pressure medications actually should be.  She needs to be educated that clonidine is a very dangerous drug if it is taken intermittently.  Thank you. Left message on vm, Awaiting return call to advise.

## 2019-10-16 NOTE — TELEPHONE ENCOUNTER
Spoke with patient  Verified patient with 2 patient identifiers    Informed per Dr Gerri Floyd doesn't recall prescribing clonidine. Informed if PCP prescribed need to contact him or can schedule appointment with Dr. Gerri Floyd to review and determine what BP medications she should be taking. Patient verbalized understanding, says will contact PCP.

## 2020-03-12 ENCOUNTER — HOSPITAL ENCOUNTER (EMERGENCY)
Age: 40
Discharge: HOME | End: 2020-03-12
Attending: EMERGENCY MEDICINE
Payer: MEDICARE

## 2020-03-12 VITALS
SYSTOLIC BLOOD PRESSURE: 141 MMHG | OXYGEN SATURATION: 98 % | BODY MASS INDEX: 34.04 KG/M2 | WEIGHT: 185 LBS | HEIGHT: 62 IN | HEART RATE: 64 BPM | DIASTOLIC BLOOD PRESSURE: 87 MMHG | TEMPERATURE: 98 F | RESPIRATION RATE: 18 BRPM

## 2020-03-12 DIAGNOSIS — Z72.0 TOBACCO ABUSE: Primary | ICD-10-CM

## 2020-03-12 DIAGNOSIS — J06.9 ACUTE URI: ICD-10-CM

## 2020-03-12 PROCEDURE — 99282 EMERGENCY DEPT VISIT SF MDM: CPT

## 2020-03-12 RX ORDER — LORATADINE 10 MG/1
10 TABLET ORAL DAILY
Qty: 20 TAB | Refills: 0 | Status: SHIPPED | OUTPATIENT
Start: 2020-03-12 | End: 2020-04-01

## 2020-03-12 RX ORDER — FLUTICASONE PROPIONATE 50 MCG
2 SPRAY, SUSPENSION (ML) NASAL DAILY
Qty: 1 BOTTLE | Refills: 0 | Status: SHIPPED | OUTPATIENT
Start: 2020-03-12 | End: 2020-08-06

## 2020-03-12 NOTE — DISCHARGE INSTRUCTIONS
Patient Education        Upper Respiratory Infection (Cold): Care Instructions  Your Care Instructions    An upper respiratory infection, or URI, is an infection of the nose, sinuses, or throat. URIs are spread by coughs, sneezes, and direct contact. The common cold is the most frequent kind of URI. The flu and sinus infections are other kinds of URIs. Almost all URIs are caused by viruses. Antibiotics won't cure them. But you can treat most infections with home care. This may include drinking lots of fluids and taking over-the-counter pain medicine. You will probably feel better in 4 to 10 days. The doctor has checked you carefully, but problems can develop later. If you notice any problems or new symptoms, get medical treatment right away. Follow-up care is a key part of your treatment and safety. Be sure to make and go to all appointments, and call your doctor if you are having problems. It's also a good idea to know your test results and keep a list of the medicines you take. How can you care for yourself at home? · To prevent dehydration, drink plenty of fluids, enough so that your urine is light yellow or clear like water. Choose water and other caffeine-free clear liquids until you feel better. If you have kidney, heart, or liver disease and have to limit fluids, talk with your doctor before you increase the amount of fluids you drink. · Take an over-the-counter pain medicine, such as acetaminophen (Tylenol), ibuprofen (Advil, Motrin), or naproxen (Aleve). Read and follow all instructions on the label. · Before you use cough and cold medicines, check the label. These medicines may not be safe for young children or for people with certain health problems. · Be careful when taking over-the-counter cold or flu medicines and Tylenol at the same time. Many of these medicines have acetaminophen, which is Tylenol. Read the labels to make sure that you are not taking more than the recommended dose.  Too much acetaminophen (Tylenol) can be harmful. · Get plenty of rest.  · Do not smoke or allow others to smoke around you. If you need help quitting, talk to your doctor about stop-smoking programs and medicines. These can increase your chances of quitting for good. When should you call for help? Call 911 anytime you think you may need emergency care. For example, call if:    · You have severe trouble breathing.    Call your doctor now or seek immediate medical care if:    · You seem to be getting much sicker.     · You have new or worse trouble breathing.     · You have a new or higher fever.     · You have a new rash.    Watch closely for changes in your health, and be sure to contact your doctor if:    · You have a new symptom, such as a sore throat, an earache, or sinus pain.     · You cough more deeply or more often, especially if you notice more mucus or a change in the color of your mucus.     · You do not get better as expected. Where can you learn more? Go to http://rachel-velma.info/  Enter K520 in the search box to learn more about \"Upper Respiratory Infection (Cold): Care Instructions. \"  Current as of: June 9, 2019Content Version: 12.4  © 3723-6595 [employer]wise, Incorporated. Care instructions adapted under license by [employer] (which disclaims liability or warranty for this information). If you have questions about a medical condition or this instruction, always ask your healthcare professional. [place] [address] any warranty or liability for your use of this information. Patient Education        Stopping Smoking: Care Instructions  Your Care Instructions  Cigarette smokers crave the nicotine in cigarettes. Giving it up is much harder than simply changing a habit. Your body has to stop craving the nicotine. It is hard to quit, but you can do it. There are many tools that people use to quit smoking.  You may find that combining tools works best for you.  There are several steps to quitting. First you get ready to quit. Then you get support to help you. After that, you learn new skills and behaviors to become a nonsmoker. For many people, a necessary step is getting and using medicine. Your doctor will help you set up the plan that best meets your needs. You may want to attend a smoking cessation program to help you quit smoking. When you choose a program, look for one that has proven success. Ask your doctor for ideas. You will greatly increase your chances of success if you take medicine as well as get counseling or join a cessation program.  Some of the changes you feel when you first quit tobacco are uncomfortable. Your body will miss the nicotine at first, and you may feel short-tempered and grumpy. You may have trouble sleeping or concentrating. Medicine can help you deal with these symptoms. You may struggle with changing your smoking habits and rituals. The last step is the tricky one: Be prepared for the smoking urge to continue for a time. This is a lot to deal with, but keep at it. You will feel better. Follow-up care is a key part of your treatment and safety. Be sure to make and go to all appointments, and call your doctor if you are having problems. It's also a good idea to know your test results and keep a list of the medicines you take. How can you care for yourself at home? · Ask your family, friends, and coworkers for support. You have a better chance of quitting if you have help and support. · Join a support group, such as Nicotine Anonymous, for people who are trying to quit smoking. · Consider signing up for a smoking cessation program, such as the American Lung Association's Freedom from Smoking program.  · Get text messaging support. Go to the website at www.smokefree. gov to sign up for the [facility] program.  · Set a quit date. Pick your date carefully so that it is not right in the middle of a big deadline or stressful time. Once you quit, do not even take a puff. Get rid of all ashtrays and lighters after your last cigarette. Clean your house and your clothes so that they do not smell of smoke. · Learn how to be a nonsmoker. Think about ways you can avoid those things that make you reach for a cigarette. ? Avoid situations that put you at greatest risk for smoking. For some people, it is hard to have a drink with friends without smoking. For others, they might skip a coffee break with coworkers who smoke. ? Change your daily routine. Take a different route to work or eat a meal in a different place. · Cut down on stress. Calm yourself or release tension by doing an activity you enjoy, such as reading a book, taking a hot bath, or gardening. · Talk to your doctor or pharmacist about nicotine replacement therapy, which replaces the nicotine in your body. You still get nicotine but you do not use tobacco. Nicotine replacement products help you slowly reduce the amount of nicotine you need. These products come in several forms, many of them available over-the-counter:  ? Nicotine patches  ? Nicotine gum and lozenges  ? Nicotine inhaler  · Ask your doctor about bupropion (Wellbutrin) or varenicline (Chantix), which are prescription medicines. They do not contain nicotine. They help you by reducing withdrawal symptoms, such as stress and anxiety. · Some people find hypnosis, acupuncture, and massage helpful for ending the smoking habit. · Eat a healthy diet and get regular exercise. Having healthy habits will help your body move past its craving for nicotine. · Be prepared to keep trying. Most people are not successful the first few times they try to quit. Do not get mad at yourself if you smoke again. Make a list of things you learned and think about when you want to try again, such as next week, next month, or next year. Where can you learn more? Go to http://rachel-velma.info/.   Enter L057 in the search box to learn more about \"Stopping Smoking: Care Instructions. \"  Current as of: September 26, 2018  Content Version: 12.2  © 0522-4668 [employer]. Care instructions adapted under license by [employer] (which disclaims liability or warranty for this information). If you have questions about a medical condition or this instruction, always ask your healthcare professional. [address] any warranty or liability for your use of this information.

## 2020-03-12 NOTE — ED NOTES

## 2020-03-12 NOTE — LETTER
[facility] EMERGENCY DEPT 
[address] [address] 
[phone] Work/School Note Date: 3/12/2020 To Whom It May concern: 
 
Arnie Lopez was seen and treated today in the emergency room by the following provider(s): 
Attending Provider: Naif Coffey MD.   
 
Arnie Lopez may return to work on 03/16/2020.  
 
Sincerely, 
 
 
 
 
Flores Kirby MD

## 2020-03-12 NOTE — ED NOTES
Pt presents ambulatory to ED complaining of dry cough x2 weeks. Pt denies fever, chills. Pt reports taking dayquil, with no relief. Pt is alert and oriented x 4, RR even and unlabored, skin is warm and dry. Assesment completed and pt updated on plan of care. Emergency Department Nursing Plan of Care       The Nursing Plan of Care is developed from the Nursing assessment and Emergency Department Attending provider initial evaluation. The plan of care may be reviewed in the ED Provider note.     The Plan of Care was developed with the following considerations:   Patient / Family readiness to learn indicated by:verbalized understanding  Persons(s) to be included in education: patient  Barriers to Learning/Limitations:No    [address]    3/12/2020   10:05 AM

## 2020-03-12 NOTE — ED PROVIDER NOTES
EMERGENCY DEPARTMENT HISTORY AND PHYSICAL EXAM      Date: 3/12/2020  Patient Name: Arnie Lopez    History of Presenting Illness     Chief Complaint   Patient presents with    Cough     History Provided By: Patient    HPI: Arnie Lopez, 45 y.o. female with past medical history significant for CAD, hiatal hernia, hypertension, and depression who presents via private vehicle to the ED with cc of 2-week history of dry cough and rhinorrhea. Patient denies any nausea, vomiting, diarrhea, fevers, or chills. She has been taking DayQuil with moderate relief of symptoms. She denies any sick contacts but does state that her daughters have both recently developed symptoms over the past 2 days. PMHx: CAD, hypertension, depression, hiatal hernia, cardiomyopathy  Social Hx: Smokes 1 pack/day, social alcohol use, denies illegal drug use    PCP: Jeffery Baker MD    There are no other complaints, changes, or physical findings at this time. No current facility-administered medications on file prior to encounter. Current Outpatient Medications on File Prior to Encounter   Medication Sig Dispense Refill    cephALEXin (KEFLEX) 500 mg capsule take 1 capsule by mouth four times a day for 10 days for BOILS  0    lamoTRIgine (LAMICTAL) 25 mg tablet TAKE 1 TO 4 TABLETS BY MOUTH ONCE DAILY AS DIRECTED FOR MOOD STABILITY  0    FLUoxetine (PROZAC) 40 mg capsule take 1 capsule by mouth every morning for depression and anxiety  0    ondansetron (ZOFRAN ODT) 4 mg disintegrating tablet   0    predniSONE (DELTASONE) 20 mg tablet   0    triamcinolone acetonide (KENALOG) 0.1 % ointment   0    omeprazole (PRILOSEC) 20 mg capsule Take 1 Cap by mouth daily. 30 Cap 0    ferrous sulfate 325 mg (65 mg iron) tablet take 1 tablet by mouth two to three times a day  0    diclofenac EC (VOLTAREN) 75 mg EC tablet take 1 tablet by mouth twice a day  0    amLODIPine (NORVASC) 10 mg tablet Take 1 Tab by mouth daily.  30 Tab 6    labetalol (NORMODYNE) 300 mg tablet Take 1 Tab by mouth two (2) times a day. Indications: high blood pressure 60 Tab 6    spironolactone (ALDACTONE) 25 mg tablet Take 1 Tab by mouth daily. 30 Tab 6    traZODone (DESYREL) 50 mg tablet   0    cloNIDine HCl (CATAPRES) 0.2 mg tablet   0    DEBLITANE 0.35 mg tab   1    FLUoxetine (PROZAC) 20 mg capsule take 1 capsule by mouth once daily for depression and anxiety  0    divalproex DR (DEPAKOTE) 500 mg tablet take 1 tablet by mouth twice a day for ANGER AND MOOD STABILITY  0    CHOLECALCIFEROL, VITAMIN D3, (VITAMIN D3 PO) Take  by mouth daily.  valACYclovir (VALTREX) 500 mg tablet   1    fluticasone-salmeterol (ADVAIR DISKUS) 100-50 mcg/dose diskus inhaler Take 1 Puff by inhalation every twelve (12) hours.  omega-3 fatty acids-vitamin e (FISH OIL) 1,000 mg cap Take 1 Cap by mouth daily.  aspirin delayed-release 81 mg tablet Take  by mouth daily.  PROAIR HFA 90 mcg/actuation inhaler inhale 2 puffs by mouth every 4 hours  0     Past History     Past Medical History:  Past Medical History:   Diagnosis Date    Abscess 7/11/2016    CAD (coronary artery disease)     enlarged heart    Delivery normal     Hiatal hernia 6/10/2019    Hypertension     Psychiatric disorder     depresssion    Tubal ligation status 12/10/15     Past Surgical History:  Past Surgical History:   Procedure Laterality Date    HX GYN      tubal ligation    HX ORTHOPAEDIC      tubal pregnancy removal    HX OTHER SURGICAL      abscesses     Family History:  History reviewed. No pertinent family history. Social History:  Social History     Tobacco Use    Smoking status: Current Some Day Smoker     Packs/day: 0.25    Smokeless tobacco: Never Used    Tobacco comment: only when drinking   Substance Use Topics    Alcohol use: Yes     Frequency: 2-4 times a month     Comment: socially    Drug use: No     Allergies:   Allergies   Allergen Reactions    Other Food Anaphylaxis [employer] seasoning    Iodine Itching    Shellfish Containing Products Itching     Review of Systems   Review of Systems   Constitutional: Negative for chills and fever. HENT: Positive for rhinorrhea. Negative for congestion, sneezing and sore throat. Eyes: Negative for redness and visual disturbance. Respiratory: Positive for cough. Negative for shortness of breath. Cardiovascular: Negative for leg swelling. Gastrointestinal: Negative for abdominal pain, nausea and vomiting. Genitourinary: Negative for difficulty urinating and frequency. Musculoskeletal: Negative for back pain, myalgias and neck stiffness. Skin: Negative for rash. Neurological: Negative for dizziness, syncope, weakness and headaches. Hematological: Negative for adenopathy. All other systems reviewed and are negative. Physical Exam   Physical Exam  Vitals signs and nursing note reviewed. Constitutional:       Appearance: Normal appearance. She is well-developed. HENT:      Head: Normocephalic and atraumatic. Eyes:      Conjunctiva/sclera: Conjunctivae normal.   Neck:      Musculoskeletal: Full passive range of motion without pain, normal range of motion and neck supple. Cardiovascular:      Rate and Rhythm: Normal rate and regular rhythm. Pulses: Normal pulses. Heart sounds: Normal heart sounds, S1 normal and S2 normal. No murmur. Pulmonary:      Effort: Pulmonary effort is normal. No respiratory distress. Breath sounds: Normal breath sounds. No wheezing. Abdominal:      General: Bowel sounds are normal. There is no distension. Palpations: Abdomen is soft. Tenderness: There is no abdominal tenderness. There is no rebound. Musculoskeletal: Normal range of motion. Skin:     General: Skin is warm and dry. Findings: No rash. Neurological:      Mental Status: She is alert and oriented to person, place, and time.    Psychiatric:         Speech: Speech normal.         Behavior: Behavior normal.         Thought Content: Thought content normal.         Judgment: Judgment normal.       Diagnostic Study Results   Labs -   No results found for this or any previous visit (from the past 12 hour(s)). Radiologic Studies -   No orders to display     No results found. Medical Decision Making   I am the first provider for this patient. I reviewed the vital signs, available nursing notes, past medical history, past surgical history, family history and social history. Vital Signs-Reviewed the patient's vital signs. Patient Vitals for the past 12 hrs:   Temp Pulse Resp BP SpO2   03/12/20 0908 98 °F (36.7 °C) 64 18 141/87 98 %     Pulse Oximetry Analysis - 98% on RA    Records Reviewed: Nursing Notes    Provider Notes (Medical Decision Making):   61-year-old female presents with cough for the past 2 weeks and rhinorrhea. She initially presented to get tested for HIV and coronavirus. I informed her that she does not have any classic symptoms for coronavirus and being that she is afebrile and not hypoxic, she does not qualify for testing. I also informed her we do not test for HIV in the emergency department. Suspect she has a viral upper respiratory infection, especially considering that she is a smoker. Will discharge with Flonase and a decongestant and have patient follow-up with her primary care doctor. We will also recommend that patient self quarantine if she has any concern for COVID-19 exposure. ED Course:   Initial assessment performed. The patients presenting problems have been discussed, and they are in agreement with the care plan formulated and outlined with them. I have encouraged them to ask questions as they arise throughout their visit. Progress Note:   Updated pt on all returned results and findings. Discussed the importance of proper follow up as referred below along with return precautions.  Pt in agreement with the care plan and expresses agreement with and understanding of all items discussed. Disposition:  Discharge Note:  The pt is ready for discharge. The pt's signs, symptoms, diagnosis, and discharge instructions have been discussed and pt has conveyed their understanding. The pt is to follow up as recommended or return to ER should their symptoms worsen. Plan has been discussed and pt is in agreement. PLAN:  1. Current Discharge Medication List      START taking these medications    Details   fluticasone propionate (FLONASE) 50 mcg/actuation nasal spray 2 Sprays by Both Nostrils route daily. Qty: 1 Bottle, Refills: 0      loratadine (CLARITIN) 10 mg tablet Take 1 Tab by mouth daily for 20 days. Qty: 20 Tab, Refills: 0           2. Follow-up Information     Follow up With Specialties Details Why Contact Info    Yessenia Solorzano MD Family Practice Schedule an appointment as soon as possible for a visit  [address] [place] [address] Dr Santiago Wilburn [id number] [phone]      [facility] EMERGENCY DEPT Emergency Medicine  As needed, If symptoms worsen [facility]  [phone]        Return to ED if worse     Diagnosis     Clinical Impression:   1. Tobacco abuse    2. Acute URI            Please note that this dictation was completed with Dragon, computer voice recognition software. Quite often unanticipated grammatical, syntax, homophones, and other interpretive errors are inadvertently transcribed by the computer software. Please disregard these errors. Additionally, please excuse any errors that have escaped final proofreading.

## 2020-04-12 DIAGNOSIS — R07.9 CHEST PAIN, UNSPECIFIED TYPE: ICD-10-CM

## 2020-04-12 DIAGNOSIS — I10 ESSENTIAL HYPERTENSION: ICD-10-CM

## 2020-04-12 DIAGNOSIS — I51.7 LEFT VENTRICULAR HYPERTROPHY: ICD-10-CM

## 2020-04-12 RX ORDER — LABETALOL 300 MG/1
TABLET, FILM COATED ORAL
Qty: 240 TAB | Refills: 1 | Status: SHIPPED | OUTPATIENT
Start: 2020-04-12 | End: 2020-08-06 | Stop reason: SDUPTHER

## 2020-08-06 ENCOUNTER — OFFICE VISIT (OUTPATIENT)
Dept: CARDIOLOGY CLINIC | Age: 40
End: 2020-08-06
Payer: MEDICARE

## 2020-08-06 VITALS
RESPIRATION RATE: 18 BRPM | DIASTOLIC BLOOD PRESSURE: 88 MMHG | BODY MASS INDEX: 33.86 KG/M2 | HEART RATE: 76 BPM | SYSTOLIC BLOOD PRESSURE: 120 MMHG | WEIGHT: 184 LBS | HEIGHT: 62 IN

## 2020-08-06 DIAGNOSIS — E66.01 SEVERE OBESITY (BMI 35.0-39.9) WITH COMORBIDITY (HCC): ICD-10-CM

## 2020-08-06 DIAGNOSIS — R07.9 CHEST PAIN, UNSPECIFIED TYPE: ICD-10-CM

## 2020-08-06 DIAGNOSIS — I51.7 LEFT VENTRICULAR HYPERTROPHY: ICD-10-CM

## 2020-08-06 DIAGNOSIS — R60.0 BILATERAL LEG EDEMA: ICD-10-CM

## 2020-08-06 DIAGNOSIS — R29.818 SUSPECTED SLEEP APNEA: ICD-10-CM

## 2020-08-06 DIAGNOSIS — I42.2 HYPERTROPHIC CARDIOMYOPATHY (HCC): ICD-10-CM

## 2020-08-06 DIAGNOSIS — I27.20 PULMONARY HTN (HCC): ICD-10-CM

## 2020-08-06 DIAGNOSIS — I10 ESSENTIAL HYPERTENSION: ICD-10-CM

## 2020-08-06 DIAGNOSIS — I10 ESSENTIAL HYPERTENSION: Primary | ICD-10-CM

## 2020-08-06 PROBLEM — I73.9 PERIPHERAL VASCULAR DISEASE (HCC): Status: ACTIVE | Noted: 2020-08-06

## 2020-08-06 PROCEDURE — G8432 DEP SCR NOT DOC, RNG: HCPCS | Performed by: SPECIALIST

## 2020-08-06 PROCEDURE — 99213 OFFICE O/P EST LOW 20 MIN: CPT | Performed by: SPECIALIST

## 2020-08-06 PROCEDURE — G8754 DIAS BP LESS 90: HCPCS | Performed by: SPECIALIST

## 2020-08-06 PROCEDURE — G8427 DOCREV CUR MEDS BY ELIG CLIN: HCPCS | Performed by: SPECIALIST

## 2020-08-06 PROCEDURE — G8752 SYS BP LESS 140: HCPCS | Performed by: SPECIALIST

## 2020-08-06 PROCEDURE — G8419 CALC BMI OUT NRM PARAM NOF/U: HCPCS | Performed by: SPECIALIST

## 2020-08-06 RX ORDER — LABETALOL 300 MG/1
TABLET, FILM COATED ORAL
Qty: 60 TAB | Refills: 5 | Status: SHIPPED | OUTPATIENT
Start: 2020-08-06 | End: 2021-11-01 | Stop reason: SDUPTHER

## 2020-08-06 RX ORDER — METRONIDAZOLE 500 MG/1
TABLET ORAL
COMMUNITY
Start: 2020-07-21

## 2020-08-06 RX ORDER — SPIRONOLACTONE 25 MG/1
25 TABLET ORAL DAILY
Qty: 30 TAB | Refills: 6 | Status: SHIPPED | OUTPATIENT
Start: 2020-08-06

## 2020-08-06 NOTE — PROGRESS NOTES
Chief Complaint   Patient presents with    Leg Swelling     c/o feet swelling and off/on sharp chest discomfort      1. Have you been to the ER, urgent care clinic since your last visit? Hospitalized since your last visit? [facility] ED 1 week ago for UTI    2. Have you seen or consulted any other health care providers outside of the [address] [place] [address] since your last visit? Include any pap smears or colon screening.  No

## 2020-08-06 NOTE — PROGRESS NOTES
HISTORY OF PRESENT ILLNESS  Yudith Sanchez is a 45 y.o. female     SUMMARY:   Problem List  Date Reviewed: 8/6/2020          Codes Class Noted    Peripheral vascular disease ([facility] ICD-10-CM: I73.9  ICD-9-CM: 443.9  8/6/2020        Hiatal hernia ICD-10-CM: K44.9  ICD-9-CM: 553.3  6/10/2019        Severe obesity (BMI 35.0-39. 9) with comorbidity ([facility] ICD-10-CM: E66.01  ICD-9-CM: 278.01  5/3/2018        Hypertrophic cardiomyopathy ([facility] ICD-10-CM: I42.2  ICD-9-CM: 425.18  5/3/2018        Chest wall pain ICD-10-CM: R07.89  ICD-9-CM: 786.52  12/8/2016        Essential hypertension ICD-10-CM: I10  ICD-9-CM: 401.9  12/8/2016        Mood disorder ([facility] ICD-10-CM: F39  ICD-9-CM: 296.90  11/3/2016        Alcohol abuse ICD-10-CM: F10.10  ICD-9-CM: 305.00  11/3/2016        Cannabis abuse ICD-10-CM: F12.10  ICD-9-CM: 305.20  11/3/2016        Abscess ICD-10-CM: L02.91  ICD-9-CM: 682.9  7/11/2016              Current Outpatient Medications on File Prior to Visit   Medication Sig    metroNIDAZOLE (FLAGYL) 500 mg tablet TK 1 T PO BID    labetaloL (NORMODYNE) 300 mg tablet TAKE 1 TABLET BY MOUTH TWICE DAILY FOR HIGH BLOOD PRESSURE    cephALEXin (KEFLEX) 500 mg capsule take 1 capsule by mouth four times a day for 10 days for BOILS    predniSONE (DELTASONE) 20 mg tablet Take 20 mg by mouth as needed (as needed for allergic reaction).  triamcinolone acetonide (KENALOG) 0.1 % ointment     amLODIPine (NORVASC) 10 mg tablet Take 1 Tab by mouth daily.  spironolactone (ALDACTONE) 25 mg tablet Take 1 Tab by mouth daily.  cloNIDine HCl (CATAPRES) 0.2 mg tablet Take 0.2 mg by mouth nightly.  CHOLECALCIFEROL, VITAMIN D3, (VITAMIN D3 PO) Take  by mouth daily.  valACYclovir (VALTREX) 500 mg tablet     fluticasone-salmeterol (ADVAIR DISKUS) 100-50 mcg/dose diskus inhaler Take 1 Puff by inhalation every twelve (12) hours.  aspirin delayed-release 81 mg tablet Take  by mouth daily.    [address] 90 mcg/actuation inhaler inhale 2 puffs by mouth every 4 hours     No current facility-administered medications on file prior to visit. CARDIOLOGY STUDIES TO DATE:  11/16 echo normal lvef, mod to severe lvh, lae, mild mr and tr with pa pressure 45mm  2/18 negative lexiscan    Chief Complaint   Patient presents with    Leg Swelling     c/o feet swelling and off/on sharp chest discomfort      HPI :  She is previously followed by Dr. Beatriz Perez for hypertension and pulmonary hypertension, suspected sleep apnea. She ran out of her diuretic a while back because of the COVID's which says she will unable to get it refilled and she is subsequently developed some dependent ankle edema. She is also off her psych medicines for the same reasons, because of the pandemic. She continues to have occasional shortness of breath with and without activity as well as atypical,  Sharp,  musculoskeletal sounding chest pain. She continues to smoke. CARDIAC ROS:   negative for palpitations, syncope, orthopnea, paroxysmal nocturnal dyspnea, exertional chest pressure/discomfort, claudication    History reviewed. No pertinent family history. Past Medical History:   Diagnosis Date    Abscess 7/11/2016    CAD (coronary artery disease)     enlarged heart    Delivery normal     Hiatal hernia 6/10/2019    Hypertension     Psychiatric disorder     depresssion    Tubal ligation status 12/10/15       GENERAL ROS:  A comprehensive review of systems was negative except for that written in the HPI.     Visit Vitals  /88 (BP 1 Location: Left arm, BP Patient Position: Sitting)   Pulse 76   Resp 18   Ht 5' 2\" (1.575 m)   Wt 184 lb (83.5 kg)   BMI 33.65 kg/m²       Wt Readings from Last 3 Encounters:   08/06/20 184 lb (83.5 kg)   03/12/20 185 lb (83.9 kg)   08/16/19 174 lb (78.9 kg)            BP Readings from Last 3 Encounters:   08/06/20 120/88   03/12/20 141/87   08/16/19 114/76       PHYSICAL EXAM  General appearance: alert, cooperative, no distress, appears stated age  Neurologic: Alert and oriented X 3  Neck: supple, symmetrical, trachea midline, no adenopathy, no carotid bruit and no JVD  Lungs: clear to auscultation bilaterally  Heart: regular rate and rhythm, S1, S2 normal, no murmur, click, rub or gallop  Extremities: varicose veins noted, edema tr      ASSESSMENT :      Overall I think she is stable and well compensated from a cardiac perspective on a reasonable medical regimen. Working to resume her diuretic and blood pressure medication. We will refer her back to the sleep lab, and will get an echocardiogram to see where we  terms of her pulmonary hypertension. current treatment plan is effective, no change in therapy  lab results and schedule of future lab studies reviewed with patient  reviewed diet, exercise and weight control  very strongly urged to quit smoking to reduce cardiovascular risk    Encounter Diagnoses   Name Primary?  Essential hypertension Yes    Severe obesity (BMI 35.0-39. 9) with comorbidity ([facility]     Chest pain, unspecified type     Bilateral leg edema      Orders Placed This Encounter    metroNIDAZOLE (FLAGYL) 500 mg tablet       Follow-up and Dispositions    · Return in about 6 months (around 2/6/2021). Virgene Sandifer, MD  8/6/2020  Please note that this dictation was completed with [employer], the computer voice recognition software. Quite often unanticipated grammatical, syntax, homophones, and other interpretive errors are inadvertently transcribed by the computer software. Please disregard these errors. Please excuse any errors that have escaped final proofreading. Thank you.

## 2021-02-27 ENCOUNTER — HOSPITAL ENCOUNTER (EMERGENCY)
Age: 41
Discharge: HOME | End: 2021-02-27
Attending: EMERGENCY MEDICINE
Payer: MEDICARE

## 2021-02-27 VITALS
OXYGEN SATURATION: 98 % | HEIGHT: 62 IN | DIASTOLIC BLOOD PRESSURE: 95 MMHG | SYSTOLIC BLOOD PRESSURE: 141 MMHG | HEART RATE: 70 BPM | BODY MASS INDEX: 33.13 KG/M2 | RESPIRATION RATE: 18 BRPM | TEMPERATURE: 98.3 F | WEIGHT: 180 LBS

## 2021-02-27 DIAGNOSIS — Z32.02 NEGATIVE PREGNANCY TEST: Primary | ICD-10-CM

## 2021-02-27 DIAGNOSIS — R10.13 ABDOMINAL PAIN, EPIGASTRIC: ICD-10-CM

## 2021-02-27 LAB
APPEARANCE UR: CLEAR
BACTERIA URNS QL MICRO: NEGATIVE /HPF
BILIRUB UR QL: NEGATIVE
COLOR UR: ABNORMAL
EPITH CASTS URNS QL MICRO: ABNORMAL /LPF
GLUCOSE UR STRIP.AUTO-MCNC: NEGATIVE MG/DL
HCG UR QL: NEGATIVE
HGB UR QL STRIP: NEGATIVE
KETONES UR QL STRIP.AUTO: NEGATIVE MG/DL
LEUKOCYTE ESTERASE UR QL STRIP.AUTO: NEGATIVE
NITRITE UR QL STRIP.AUTO: NEGATIVE
PH UR STRIP: 7 [PH] (ref 5–8)
PROT UR STRIP-MCNC: NEGATIVE MG/DL
RBC #/AREA URNS HPF: ABNORMAL /HPF (ref 0–5)
SP GR UR REFRACTOMETRY: 1.03 (ref 1–1.03)
UA: UC IF INDICATED,UAUC: ABNORMAL
UROBILINOGEN UR QL STRIP.AUTO: 1 EU/DL (ref 0.2–1)
WBC URNS QL MICRO: ABNORMAL /HPF (ref 0–4)

## 2021-02-27 PROCEDURE — 81001 URINALYSIS AUTO W/SCOPE: CPT

## 2021-02-27 PROCEDURE — 81025 URINE PREGNANCY TEST: CPT

## 2021-02-27 PROCEDURE — 74011000250 HC RX REV CODE- 250: Performed by: NURSE PRACTITIONER

## 2021-02-27 PROCEDURE — 74011250637 HC RX REV CODE- 250/637: Performed by: NURSE PRACTITIONER

## 2021-02-27 PROCEDURE — 99283 EMERGENCY DEPT VISIT LOW MDM: CPT

## 2021-02-27 RX ORDER — PANTOPRAZOLE SODIUM 40 MG/1
40 TABLET, DELAYED RELEASE ORAL DAILY
Qty: 30 TAB | Refills: 0 | Status: SHIPPED | OUTPATIENT
Start: 2021-02-27

## 2021-02-27 RX ADMIN — LIDOCAINE HYDROCHLORIDE 40 ML: 20 SOLUTION ORAL; TOPICAL at 14:35

## 2021-02-27 NOTE — ED PROVIDER NOTES
EMERGENCY DEPARTMENT HISTORY AND PHYSICAL EXAM    Date: 2/27/2021  Patient Name: Sofia Mendoza    History of Presenting Illness     Chief Complaint   Patient presents with   Connye Sole Pregnancy Test     requesting pregnancy test    Abdominal Pain     times one day    Letter for School/Work     per pt she needs a work note          History Provided By: Patient    HPI: Sofia Mendoza is a 37 y.o. female with a PMH of Hypertension, depression, hiatal hernia who presents with abdominal pain. Onset 3 days ago. Patient states she feels pain in the mid abdomen. Denies nausea, vomiting, diarrhea. Patient reports history of ulcers not currently on any medication. Reports recent intake of spicy food. Patient is also concerned for pregnancy. Reports history of tubal ligation but was informed that people can become pregnant after 10 years. Patient states she has menses every month after tubal ligation but last menses was light and short. Reports he is sexually active with no condom use. Denies vaginal or urinary symptoms. Patient denies excessive alcohol intake. PCP: Sophie Leon MD    Current Facility-Administered Medications   Medication Dose Route Frequency Provider Last Rate Last Admin    mylanta/viscous lidocaine (GI COCKTAIL)  40 mL Oral ONCE Addi Lyon NP         Current Outpatient Medications   Medication Sig Dispense Refill    metroNIDAZOLE (FLAGYL) 500 mg tablet TK 1 T PO BID      labetaloL (NORMODYNE) 300 mg tablet TAKE 1 TABLET BY MOUTH TWICE DAILY FOR HIGH BLOOD PRESSURE 60 Tab 5    spironolactone (ALDACTONE) 25 mg tablet Take 1 Tab by mouth daily. 30 Tab 6    cephALEXin (KEFLEX) 500 mg capsule take 1 capsule by mouth four times a day for 10 days for BOILS  0    predniSONE (DELTASONE) 20 mg tablet Take 20 mg by mouth as needed (as needed for allergic reaction). 0    triamcinolone acetonide (KENALOG) 0.1 % ointment   0    amLODIPine (NORVASC) 10 mg tablet Take 1 Tab by mouth daily.  27 Tab 6    cloNIDine HCl (CATAPRES) 0.2 mg tablet Take 0.2 mg by mouth nightly. 0    CHOLECALCIFEROL, VITAMIN D3, (VITAMIN D3 PO) Take  by mouth daily.  valACYclovir (VALTREX) 500 mg tablet   1    fluticasone-salmeterol (ADVAIR DISKUS) 100-50 mcg/dose diskus inhaler Take 1 Puff by inhalation every twelve (12) hours.  aspirin delayed-release 81 mg tablet Take  by mouth daily.  PROAIR HFA 90 mcg/actuation inhaler inhale 2 puffs by mouth every 4 hours  0       Past History     Past Medical History:  Past Medical History:   Diagnosis Date    Abscess 7/11/2016    CAD (coronary artery disease)     enlarged heart    Delivery normal     Hiatal hernia 6/10/2019    Hypertension     Psychiatric disorder     depresssion    Tubal ligation status 12/10/15       Past Surgical History:  Past Surgical History:   Procedure Laterality Date    HX GYN      tubal ligation    HX ORTHOPAEDIC      tubal pregnancy removal    HX OTHER SURGICAL      abscesses       Family History:  History reviewed. No pertinent family history. Social History:  Social History     Tobacco Use    Smoking status: Current Some Day Smoker     Packs/day: 0.25    Smokeless tobacco: Never Used    Tobacco comment: only when drinking   Substance Use Topics    Alcohol use: Yes     Frequency: 2-4 times a month     Comment: socially    Drug use: No       Allergies: Allergies   Allergen Reactions    Other Food Anaphylaxis     Old Bey seasoning    Iodine Itching    Shellfish Containing Products Itching         Review of Systems   Review of Systems   Constitutional: Negative for appetite change, chills, fatigue and fever. HENT: Negative for congestion, ear pain and rhinorrhea. Eyes: Negative for pain and itching. Respiratory: Negative for cough, chest tightness, shortness of breath and wheezing. Cardiovascular: Negative for chest pain, palpitations and leg swelling. Gastrointestinal: Positive for abdominal pain.  Negative for blood in stool, constipation, nausea and vomiting. Genitourinary: Negative for dysuria, frequency and urgency. Musculoskeletal: Negative for arthralgias, back pain and joint swelling. Skin: Negative for color change and rash. Neurological: Negative for dizziness, numbness and headaches. All other systems reviewed and are negative. Physical Exam     Vitals:    02/27/21 1207   BP: (!) 141/95   Pulse: 70   Resp: 18   Temp: 98.3 °F (36.8 °C)   SpO2: 98%   Weight: 81.6 kg (180 lb)   Height: 5' 2\" (1.575 m)     Physical Exam  Vitals signs and nursing note reviewed. Constitutional:       General: She is not in acute distress. Appearance: She is well-developed. She is not ill-appearing. HENT:      Head: Normocephalic and atraumatic. Right Ear: Tympanic membrane and ear canal normal.      Left Ear: Tympanic membrane and ear canal normal.      Nose: Nose normal.      Mouth/Throat:      Mouth: Mucous membranes are moist.      Pharynx: Oropharynx is clear. Eyes:      Extraocular Movements: Extraocular movements intact. Conjunctiva/sclera: Conjunctivae normal.      Pupils: Pupils are equal, round, and reactive to light. Neck:      Musculoskeletal: Normal range of motion and neck supple. Cardiovascular:      Rate and Rhythm: Normal rate and regular rhythm. Pulses: Normal pulses. Heart sounds: Normal heart sounds. Pulmonary:      Effort: Pulmonary effort is normal.      Breath sounds: Normal breath sounds. Abdominal:      General: Bowel sounds are normal. There is no distension. Palpations: Abdomen is soft. There is no mass. Tenderness: There is abdominal tenderness in the epigastric area. There is no guarding or rebound. Musculoskeletal: Normal range of motion. Skin:     General: Skin is warm and dry. Neurological:      Mental Status: She is alert and oriented to person, place, and time.            Diagnostic Study Results     Labs -     Recent Results (from the past 12 hour(s))   URINALYSIS W/ REFLEX CULTURE    Collection Time: 02/27/21  1:25 PM    Specimen: Urine   Result Value Ref Range    Color YELLOW/STRAW      Appearance CLEAR CLEAR      Specific gravity 1.030 1.003 - 1.030      pH (UA) 7.0 5.0 - 8.0      Protein Negative NEG mg/dL    Glucose Negative NEG mg/dL    Ketone Negative NEG mg/dL    Bilirubin Negative NEG      Blood Negative NEG      Urobilinogen 1.0 0.2 - 1.0 EU/dL    Nitrites Negative NEG      Leukocyte Esterase Negative NEG      WBC 0-4 0 - 4 /hpf    RBC 0-5 0 - 5 /hpf    Epithelial cells MODERATE (A) FEW /lpf    Bacteria Negative NEG /hpf    UA:UC IF INDICATED CULTURE NOT INDICATED BY UA RESULT CNI     HCG URINE, QL. - POC    Collection Time: 02/27/21  1:25 PM   Result Value Ref Range    Pregnancy test,urine (POC) Negative NEG         Radiologic Studies -   No orders to display     CT Results  (Last 48 hours)    None        CXR Results  (Last 48 hours)    None            Medical Decision Making   I am the first provider for this patient. I reviewed the vital signs, available nursing notes, past medical history, past surgical history, family history and social history. Vital Signs-Reviewed the patient's vital signs. Records Reviewed: Nursing Notes, Old Medical Records, Previous Radiology Studies and Previous Laboratory Studies      50-year-old female with complaints of mid abdominal pain exhibiting mid epigastric tenderness but no guarding or rigidity on exam.  Patient is afebrile. Urine pregnancy test negative and UA unremarkable. Likely gastritis with patient history of acid reflux. Will give GI cocktail and DC home. Disposition:  Discharge   DISCHARGE NOTE:         Care plan outlined and precautions discussed. Patient has no new complaints, changes, or physical findings. All of pt's questions and concerns were addressed.  Patient was instructed and agrees to follow up with PCP, as well as to return to the ED upon further deterioration. Patient is ready to go home. Follow-up Information     Follow up With Specialties Details Why Contact Info    Timo Lopez MD Family Medicine Call in 1 week As needed, If symptoms worsen [address] [facility]  [address] [facility]   [phone]            Current Discharge Medication List          Provider Notes (Medical Decision Making):   DDX: UTI, Gastritis, GERD, Pregnancy     Procedures:  Procedures    Please note that this dictation was completed with Dragon, computer voice recognition software. Quite often unanticipated grammatical, syntax, homophones, and other interpretive errors are inadvertently transcribed by the computer software. Please disregard these errors. Additionally, please excuse any errors that have escaped final proofreading. Diagnosis     Clinical Impression:   1. Negative pregnancy test    2.  Abdominal pain, epigastric

## 2021-02-27 NOTE — ED NOTES
Patient here with c/o mid upper abdominal pain. Patient states symptoms x3 days. Patient states she had her tubes tied but expresses concern for pregnancy \"in the wrong place. \"  Patient also states concern for stomach ulcer, states past history of. Patient states pain worsened last night after she went out for her birthday. Patient reports nausea however denies vomiting or diarrhea. Patient denies fevers. Emergency Department Nursing Plan of Care       The Nursing Plan of Care is developed from the Nursing assessment and Emergency Department Attending provider initial evaluation. The plan of care may be reviewed in the ED Provider note.     The Plan of Care was developed with the following considerations:   Patient / Family readiness to learn indicated by:verbalized understanding  Persons(s) to be included in education: patient  Barriers to Learning/Limitations:No    Signed     Lacy Chan RN    2/27/2021   12:55 PM

## 2021-02-27 NOTE — Clinical Note
[address] EMERGENCY DEPT 
[address] [place] [id number] 
[phone] Work/School Note Date: 2/27/2021 To Whom It May concern: 
 
 
Hernan Us was seen and treated today in the emergency room by the following provider(s): 
Attending Provider: Ciarra Montes MD 
Nurse Practitioner: Marnie Tuttle NP. Hernan Us is excused from work/school on 02/27/21. She is clear to return to work/school on 02/28/21. Sincerely, lSime Hylton NP

## 2021-05-26 ENCOUNTER — APPOINTMENT (OUTPATIENT)
Dept: GENERAL RADIOLOGY | Age: 41
End: 2021-05-26
Payer: MEDICARE

## 2021-05-26 ENCOUNTER — HOSPITAL ENCOUNTER (EMERGENCY)
Age: 41
Discharge: HOME | End: 2021-05-26
Attending: EMERGENCY MEDICINE
Payer: MEDICARE

## 2021-05-26 VITALS
OXYGEN SATURATION: 100 % | WEIGHT: 196.5 LBS | BODY MASS INDEX: 38.58 KG/M2 | DIASTOLIC BLOOD PRESSURE: 123 MMHG | TEMPERATURE: 98.3 F | HEART RATE: 67 BPM | RESPIRATION RATE: 18 BRPM | SYSTOLIC BLOOD PRESSURE: 152 MMHG | HEIGHT: 60 IN

## 2021-05-26 DIAGNOSIS — M54.2 NECK PAIN: Primary | ICD-10-CM

## 2021-05-26 DIAGNOSIS — M54.6 ACUTE BILATERAL THORACIC BACK PAIN: ICD-10-CM
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
## 2021-05-26 PROCEDURE — 72050 X-RAY EXAM NECK SPINE 4/5VWS: CPT

## 2021-05-26 PROCEDURE — 99283 EMERGENCY DEPT VISIT LOW MDM: CPT

## 2021-05-26 PROCEDURE — 72072 X-RAY EXAM THORAC SPINE 3VWS: CPT

## 2021-05-26 PROCEDURE — 74011250637 HC RX REV CODE- 250/637: Performed by: PHYSICIAN ASSISTANT

## 2021-05-26 RX ORDER — LIDOCAINE 50 MG/G
PATCH TOPICAL
Qty: 5 EACH | Refills: 0 | Status: SHIPPED | OUTPATIENT
Start: 2021-05-26

## 2021-05-26 RX ORDER — TIZANIDINE 4 MG/1
4 TABLET ORAL
Qty: 15 TABLET | Refills: 0 | Status: SHIPPED | OUTPATIENT
Start: 2021-05-26 | End: 2021-05-31

## 2021-05-26 RX ORDER — IBUPROFEN 400 MG/1
400 TABLET ORAL
Status: COMPLETED | OUTPATIENT
Start: 2021-05-26 | End: 2021-05-26

## 2021-05-26 RX ORDER — IBUPROFEN 600 MG/1
600 TABLET ORAL
Qty: 20 TABLET | Refills: 0 | OUTPATIENT
Start: 2021-05-26 | End: 2022-09-26

## 2021-05-26 RX ADMIN — IBUPROFEN 400 MG: 400 TABLET, FILM COATED ORAL at 18:02
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
## 2021-05-26 NOTE — LETTER
[facility] [address] EMERGENCY DEPT 
[address] [address] 
[phone] Work/School Note Date: 5/26/2021 To Whom It May concern: 
 
 
Lukasz Gonsalez was seen and treated today in the emergency room. She may return to work in 1 to 2 days, as symptoms improve. Sincerely, Ricardo Lobo, [address]

## 2021-05-26 NOTE — ED NOTES
Patient  given copy of dc instructions and 0 paper script(s) and 3 electronic scripts. Patient ( verbalized understanding of instructions and script (s). Patient given a current medication reconciliation form and verbalized understanding of their medications. Patient  verbalized understanding of the importance of discussing medications with  his or her physician or clinic they will be following up with. Patient alert and oriented and in no acute distress. Patient offered wheelchair from treatment area to hospital entrance, patient declined wheelchair.
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
## 2021-10-28 DIAGNOSIS — I10 ESSENTIAL HYPERTENSION: ICD-10-CM

## 2021-10-28 DIAGNOSIS — R07.9 CHEST PAIN, UNSPECIFIED TYPE: ICD-10-CM

## 2021-10-28 DIAGNOSIS — I51.7 LEFT VENTRICULAR HYPERTROPHY: ICD-10-CM

## 2021-10-28 RX ORDER — LABETALOL 300 MG/1
TABLET, FILM COATED ORAL
Qty: 60 TABLET | Refills: 5 | OUTPATIENT
Start: 2021-10-28

## 2021-11-01 NOTE — TELEPHONE ENCOUNTER
Requested Prescriptions     Pending Prescriptions Disp Refills    labetaloL (NORMODYNE) 300 mg tablet 60 Tablet 5     Sig: TAKE 1 TABLET BY MOUTH TWICE DAILY FOR HIGH BLOOD PRESSURE       Last seen: 08/06/2020  No show on 02/09/2021  Future visit: 11/18/2021

## 2021-11-02 DIAGNOSIS — I51.7 LEFT VENTRICULAR HYPERTROPHY: ICD-10-CM

## 2021-11-02 DIAGNOSIS — R07.9 CHEST PAIN, UNSPECIFIED TYPE: ICD-10-CM

## 2021-11-02 DIAGNOSIS — I10 ESSENTIAL HYPERTENSION: ICD-10-CM

## 2021-11-02 RX ORDER — LABETALOL 300 MG/1
TABLET, FILM COATED ORAL
Qty: 180 TABLET | Refills: 3 | Status: SHIPPED | OUTPATIENT
Start: 2021-11-02

## 2021-11-02 RX ORDER — LABETALOL 300 MG/1
TABLET, FILM COATED ORAL
Qty: 60 TABLET | Refills: 0 | Status: SHIPPED | OUTPATIENT
Start: 2021-11-02 | End: 2021-11-02

## 2022-03-19 PROBLEM — E66.01 SEVERE OBESITY (BMI 35.0-39.9) WITH COMORBIDITY (HCC): Status: ACTIVE | Noted: 2018-05-03

## 2022-03-19 PROBLEM — K44.9 HIATAL HERNIA: Status: ACTIVE | Noted: 2019-06-10

## 2022-03-20 PROBLEM — I42.2 HYPERTROPHIC CARDIOMYOPATHY (HCC): Status: ACTIVE | Noted: 2018-05-03

## 2022-03-20 PROBLEM — I73.9 PERIPHERAL VASCULAR DISEASE (HCC): Status: ACTIVE | Noted: 2020-08-06

## 2022-06-21 ENCOUNTER — HOSPITAL ENCOUNTER (EMERGENCY)
Age: 42
Discharge: HOME | End: 2022-06-21
Attending: EMERGENCY MEDICINE
Payer: MEDICARE

## 2022-06-21 VITALS
HEIGHT: 60 IN | TEMPERATURE: 98.3 F | SYSTOLIC BLOOD PRESSURE: 173 MMHG | BODY MASS INDEX: 41.43 KG/M2 | HEART RATE: 88 BPM | WEIGHT: 211 LBS | DIASTOLIC BLOOD PRESSURE: 97 MMHG | OXYGEN SATURATION: 97 % | RESPIRATION RATE: 19 BRPM

## 2022-06-21 DIAGNOSIS — I10 HYPERTENSION, UNSPECIFIED TYPE: ICD-10-CM

## 2022-06-21 DIAGNOSIS — I10 ESSENTIAL HYPERTENSION: ICD-10-CM

## 2022-06-21 DIAGNOSIS — L30.9 ECZEMA, UNSPECIFIED TYPE: Primary | ICD-10-CM

## 2022-06-21 DIAGNOSIS — Z76.0 MEDICATION REFILL: ICD-10-CM

## 2022-06-21 PROCEDURE — 99283 EMERGENCY DEPT VISIT LOW MDM: CPT

## 2022-06-21 RX ORDER — AMLODIPINE BESYLATE 10 MG/1
10 TABLET ORAL DAILY
Qty: 30 TABLET | Refills: 0 | Status: SHIPPED | OUTPATIENT
Start: 2022-06-21

## 2022-06-21 RX ORDER — TRIAMCINOLONE ACETONIDE 1 MG/G
OINTMENT TOPICAL
Qty: 80 G | Refills: 0 | Status: SHIPPED | OUTPATIENT
Start: 2022-06-21

## 2022-06-21 NOTE — ED PROVIDER NOTES
EMERGENCY DEPARTMENT HISTORY AND PHYSICAL EXAM    Date: 6/21/2022  Patient Name: Piedad Rudd    History of Presenting Illness     Chief Complaint   Patient presents with   Haresh Carbon Dry Skin     pt requesting refill for eczema medication, unknown name. History Provided By: Patient    HPI: Piedad Rudd is a 40 y.o. female with a PMH of HTN, CAD, Depression  who presents with dry skin. Pt states she has been able to schedule an appointment with her PCP to have her eczema evaluated. She reports running out of medication and requires refills. Reports dry skin to iron and bilateral arms. Denies changes in soap, lotion ,detergnet, medication or heat exposure. Associated with itching. In addition she request for amlodipine refill. Denies CP, SOB, headache, dizziness    PCP: Caitlyn Winn MD    Current Outpatient Medications   Medication Sig Dispense Refill    amLODIPine (NORVASC) 10 mg tablet Take 1 Tablet by mouth daily. 30 Tablet 0    triamcinolone acetonide (KENALOG) 0.1 % ointment Apply to affected area daily (Patient not taking: Reported on 6/21/2022) 80 g 0    labetaloL (NORMODYNE) 300 mg tablet TAKE 1 TABLET BY MOUTH TWICE DAILY FOR HIGH BLOOD PRESSURE 180 Tablet 3    ibuprofen (MOTRIN) 600 mg tablet Take 1 Tablet by mouth every six (6) hours as needed for Pain for up to 20 doses. (Patient not taking: Reported on 6/21/2022) 20 Tablet 0    lidocaine (LIDODERM) 5 % Apply patch to the affected area for 12 hours a day and remove for 12 hours a day. 5 Each 0    pantoprazole (PROTONIX) 40 mg tablet Take 1 Tab by mouth daily. (Patient not taking: Reported on 6/21/2022) 30 Tab 0    metroNIDAZOLE (FLAGYL) 500 mg tablet TK 1 T PO BID (Patient not taking: Reported on 6/21/2022)      spironolactone (ALDACTONE) 25 mg tablet Take 1 Tab by mouth daily.  (Patient not taking: Reported on 6/21/2022) 30 Tab 6    cephALEXin (KEFLEX) 500 mg capsule take 1 capsule by mouth four times a day for 10 days for BOILS (Patient not taking: Reported on 6/21/2022)  0    predniSONE (DELTASONE) 20 mg tablet Take 20 mg by mouth as needed (as needed for allergic reaction). (Patient not taking: Reported on 6/21/2022)  0    cloNIDine HCl (CATAPRES) 0.2 mg tablet Take 0.2 mg by mouth nightly. 0    CHOLECALCIFEROL, VITAMIN D3, (VITAMIN D3 PO) Take  by mouth daily. (Patient not taking: Reported on 6/21/2022)      valACYclovir (VALTREX) 500 mg tablet  (Patient not taking: Reported on 6/21/2022)  1    fluticasone-salmeterol (ADVAIR DISKUS) 100-50 mcg/dose diskus inhaler Take 1 Puff by inhalation every twelve (12) hours. (Patient not taking: Reported on 6/21/2022)      aspirin delayed-release 81 mg tablet Take  by mouth daily. (Patient not taking: Reported on 6/21/2022)      PROAIR HFA 90 mcg/actuation inhaler inhale 2 puffs by mouth every 4 hours (Patient not taking: Reported on 6/21/2022)  0       Past History     Past Medical History:  Past Medical History:   Diagnosis Date    Abscess 7/11/2016    CAD (coronary artery disease)     enlarged heart    Delivery normal     Hiatal hernia 6/10/2019    Hypertension     Psychiatric disorder     depresssion    Tubal ligation status 12/10/15       Past Surgical History:  Past Surgical History:   Procedure Laterality Date    HX GYN      tubal ligation    HX ORTHOPAEDIC      tubal pregnancy removal    HX OTHER SURGICAL      abscesses       Family History:  History reviewed. No pertinent family history. Social History:  Social History     Tobacco Use    Smoking status: Current Some Day Smoker     Packs/day: 0.25    Smokeless tobacco: Never Used    Tobacco comment: only when drinking   Substance Use Topics    Alcohol use: Yes     Comment: socially    Drug use: No       Allergies:   Allergies   Allergen Reactions    Other Food Anaphylaxis     Old Bey seasoning    Iodine Itching    Shellfish Containing Products Itching         Review of Systems   Review of Systems Constitutional: Negative for chills and fever. HENT: Negative for congestion and rhinorrhea. Eyes: Negative for pain and itching. Respiratory: Negative for cough and shortness of breath. Cardiovascular: Negative for chest pain and leg swelling. Gastrointestinal: Negative for abdominal pain, nausea and vomiting. Musculoskeletal: Negative for arthralgias and joint swelling. Skin: Positive for rash. Negative for color change. No drainage, redness, swelling, warmth   Neurological: Negative for headaches. All other systems reviewed and are negative. Physical Exam     Vitals:    06/21/22 1540   BP: (!) 173/97   Pulse: 88   Resp: 19   Temp: 98.3 °F (36.8 °C)   SpO2: 97%   Weight: 95.7 kg (211 lb)   Height: 5' (1.524 m)     Physical Exam  Vitals and nursing note reviewed. Constitutional:       General: She is not in acute distress. Appearance: She is well-developed. She is not ill-appearing. Cardiovascular:      Rate and Rhythm: Normal rate and regular rhythm. Heart sounds: Normal heart sounds. Pulmonary:      Effort: Pulmonary effort is normal.      Breath sounds: Normal breath sounds. Skin:     Findings: Rash (fine papular rash to the antecubital regions of bilateral forearms) present. Neurological:      Mental Status: She is alert and oriented to person, place, and time. Diagnostic Study Results     Labs -   No results found for this or any previous visit (from the past 12 hour(s)). Radiologic Studies -   No orders to display     CT Results  (Last 48 hours)    None        CXR Results  (Last 48 hours)    None            Medical Decision Making   I am the first provider for this patient. I reviewed the vital signs, available nursing notes, past medical history, past surgical history, family history and social history. Vital Signs-Reviewed the patient's vital signs.     Records Reviewed: Nursing Notes and Old Medical Records    Provider Notes (Medical Decision Making):   41 yo F present with dry skin requesting for refills. Exhibits fine papular rash that can be consistent with dermatitis vs heat rash. Plan to treat with steroid cream and advised PCP flow up if no improvement           Disposition:  Discharge     DISCHARGE NOTE:         Care plan outlined and precautions discussed. Patient has no new complaints, changes, or physical findings. All of pt's questions and concerns were addressed. Patient was instructed and agrees to follow up with PCP, as well as to return to the ED upon further deterioration. Patient is ready to go home. Follow-up Information     Follow up With Specialties Details Why [address] [address] [address] [address]  [phone]            Discharge Medication List as of 6/21/2022  4:15 PM      CONTINUE these medications which have CHANGED    Details   amLODIPine (NORVASC) 10 mg tablet Take 1 Tablet by mouth daily. , Normal, Disp-30 Tablet, R-0      triamcinolone acetonide (KENALOG) 0.1 % ointment Apply to affected area daily, Normal, Disp-80 g, R-0         CONTINUE these medications which have NOT CHANGED    Details   labetaloL (NORMODYNE) 300 mg tablet TAKE 1 TABLET BY MOUTH TWICE DAILY FOR HIGH BLOOD PRESSURE, Normal, Disp-180 Tablet, R-3**Patient requests 90 days supply**      ibuprofen (MOTRIN) 600 mg tablet Take 1 Tablet by mouth every six (6) hours as needed for Pain for up to 20 doses. , Normal, Disp-20 Tablet, R-0      lidocaine (LIDODERM) 5 % Apply patch to the affected area for 12 hours a day and remove for 12 hours a day., Normal, Disp-5 Each, R-0      pantoprazole (PROTONIX) 40 mg tablet Take 1 Tab by mouth daily. , Normal, Disp-30 Tab, R-0      metroNIDAZOLE (FLAGYL) 500 mg tablet TK 1 T PO BID, Historical Med      spironolactone (ALDACTONE) 25 mg tablet Take 1 Tab by mouth daily. , Normal, Disp-30 Tab, R-6      cephALEXin (KEFLEX) 500 mg capsule take 1 capsule by mouth four times a day for 10 days for BOILS, Historical Med, R-0      predniSONE (DELTASONE) 20 mg tablet Take 20 mg by mouth as needed (as needed for allergic reaction). , Historical Med, R-0      cloNIDine HCl (CATAPRES) 0.2 mg tablet Take 0.2 mg by mouth nightly., Historical Med, R-0      CHOLECALCIFEROL, VITAMIN D3, (VITAMIN D3 PO) Take  by mouth daily. , Historical Med      valACYclovir (VALTREX) 500 mg tablet Historical Med, R-1      fluticasone-salmeterol (ADVAIR DISKUS) 100-50 mcg/dose diskus inhaler Take 1 Puff by inhalation every twelve (12) hours. , Historical Med      aspirin delayed-release 81 mg tablet Take  by mouth daily. , Historical Med      PROAIR HFA 90 mcg/actuation inhaler inhale 2 puffs by mouth every 4 hours, Historical Med, [id number]             Procedures:  Procedures    Please note that this dictation was completed with Dragon, computer voice recognition software. Quite often unanticipated grammatical, syntax, homophones, and other interpretive errors are inadvertently transcribed by the computer software. Please disregard these errors. Additionally, please excuse any errors that have escaped final proofreading. Diagnosis     Clinical Impression:   1. Eczema, unspecified type    2. Hypertension, unspecified type    3. Medication refill    4.  Essential hypertension

## 2022-06-21 NOTE — DISCHARGE INSTRUCTIONS
It was a pleasure taking care of you at [facility] [place] Emergency Department today. We know that when you come to [facility], you are entrusting us with your health, comfort, and safety. Our physicians and nurses honor that trust, and we truly appreciate the opportunity to care for you and your loved ones. We also value our feedback. If you receive a survey about your Emergency Department experience today, please fill it out. We care about our patients' feedback, and we listen to what you have to say. Thank you!

## 2022-09-26 ENCOUNTER — HOSPITAL ENCOUNTER (EMERGENCY)
Age: 42
Discharge: HOME | End: 2022-09-26
Attending: EMERGENCY MEDICINE
Payer: MEDICARE

## 2022-09-26 ENCOUNTER — APPOINTMENT (OUTPATIENT)
Dept: CT IMAGING | Age: 42
End: 2022-09-26
Attending: PHYSICIAN ASSISTANT
Payer: MEDICARE

## 2022-09-26 VITALS
HEIGHT: 60 IN | RESPIRATION RATE: 13 BRPM | TEMPERATURE: 99.4 F | SYSTOLIC BLOOD PRESSURE: 165 MMHG | HEART RATE: 95 BPM | BODY MASS INDEX: 41.43 KG/M2 | WEIGHT: 211 LBS | DIASTOLIC BLOOD PRESSURE: 95 MMHG | OXYGEN SATURATION: 100 %

## 2022-09-26 DIAGNOSIS — N12 PYELONEPHRITIS: Primary | ICD-10-CM

## 2022-09-26 LAB
APPEARANCE UR: ABNORMAL
BACTERIA URNS QL MICRO: ABNORMAL /HPF
BILIRUB UR QL: NEGATIVE
COLOR UR: ABNORMAL
EPITH CASTS URNS QL MICRO: ABNORMAL /LPF
GLUCOSE UR STRIP.AUTO-MCNC: NEGATIVE MG/DL
HCG UR QL: NEGATIVE
HGB UR QL STRIP: ABNORMAL
KETONES UR QL STRIP.AUTO: NEGATIVE MG/DL
LEUKOCYTE ESTERASE UR QL STRIP.AUTO: ABNORMAL
NITRITE UR QL STRIP.AUTO: POSITIVE
PH UR STRIP: 6.5 [PH] (ref 5–8)
PROT UR STRIP-MCNC: 30 MG/DL
RBC #/AREA URNS HPF: ABNORMAL /HPF (ref 0–5)
SP GR UR REFRACTOMETRY: 1.01
UA: UC IF INDICATED,UAUC: ABNORMAL
UROBILINOGEN UR QL STRIP.AUTO: 1 EU/DL (ref 0.2–1)
WBC URNS QL MICRO: ABNORMAL /HPF (ref 0–4)

## 2022-09-26 PROCEDURE — 74176 CT ABD & PELVIS W/O CONTRAST: CPT

## 2022-09-26 PROCEDURE — 96372 THER/PROPH/DIAG INJ SC/IM: CPT

## 2022-09-26 PROCEDURE — 87186 SC STD MICRODIL/AGAR DIL: CPT

## 2022-09-26 PROCEDURE — 87086 URINE CULTURE/COLONY COUNT: CPT

## 2022-09-26 PROCEDURE — 81025 URINE PREGNANCY TEST: CPT

## 2022-09-26 PROCEDURE — 99284 EMERGENCY DEPT VISIT MOD MDM: CPT

## 2022-09-26 PROCEDURE — 81001 URINALYSIS AUTO W/SCOPE: CPT

## 2022-09-26 PROCEDURE — 87077 CULTURE AEROBIC IDENTIFY: CPT

## 2022-09-26 PROCEDURE — 74011250636 HC RX REV CODE- 250/636: Performed by: PHYSICIAN ASSISTANT

## 2022-09-26 RX ORDER — CIPROFLOXACIN 500 MG/1
500 TABLET ORAL 2 TIMES DAILY
Qty: 14 TABLET | Refills: 0 | Status: SHIPPED | OUTPATIENT
Start: 2022-09-26 | End: 2022-10-03

## 2022-09-26 RX ORDER — KETOROLAC TROMETHAMINE 30 MG/ML
30 INJECTION, SOLUTION INTRAMUSCULAR; INTRAVENOUS ONCE
Status: COMPLETED | OUTPATIENT
Start: 2022-09-26 | End: 2022-09-26

## 2022-09-26 RX ORDER — PHENAZOPYRIDINE HYDROCHLORIDE 200 MG/1
200 TABLET, FILM COATED ORAL 3 TIMES DAILY
Qty: 6 TABLET | Refills: 0 | Status: SHIPPED | OUTPATIENT
Start: 2022-09-26 | End: 2022-09-28

## 2022-09-26 RX ORDER — NAPROXEN 500 MG/1
500 TABLET ORAL
Qty: 20 TABLET | Refills: 0 | Status: SHIPPED | OUTPATIENT
Start: 2022-09-26 | End: 2022-10-06

## 2022-09-26 RX ADMIN — KETOROLAC TROMETHAMINE 30 MG: 30 INJECTION, SOLUTION INTRAMUSCULAR; INTRAVENOUS at 16:48

## 2022-09-26 NOTE — ED TRIAGE NOTES
Pt reporting left lower back/flank pain x 1.5 days. Pt endorses urinary changes such as dysuria, frequency, and urgency. Pt believes she has a kidney stone, hx of kidney stones. Endorses nausea.

## 2022-09-26 NOTE — ED PROVIDER NOTES
EMERGENCY DEPARTMENT HISTORY AND PHYSICAL EXAM          Date: 9/26/2022  Patient Name: Cherylene Sauger    History of Presenting Illness     Chief Complaint   Patient presents with    Flank Pain         History Provided By: Patient    HPI: Cherylene Sauger is a 40 y.o. female with a PMH of hypertension, depression, CAD, hiatal hernia who presents with left flank pain x2 days. Patient reports a history of nephrolithiasis the last one being 1 year ago. Patient reports cloudy urine, nausea, and headaches associated with it. There are no alleviating or exacerbating factors reported. Patient rates pain 10 out of 10. She has not taken anything for symptoms prior to arrival.    PCP: Trung Guan MD    Current Outpatient Medications   Medication Sig Dispense Refill    ciprofloxacin HCl (Cipro) 500 mg tablet Take 1 Tablet by mouth two (2) times a day for 7 days. 14 Tablet 0    phenazopyridine (Pyridium) 200 mg tablet Take 1 Tablet by mouth three (3) times daily for 2 days. 6 Tablet 0    naproxen (Naprosyn) 500 mg tablet Take 1 Tablet by mouth every twelve (12) hours as needed for Pain for up to 10 days. 20 Tablet 0    amLODIPine (NORVASC) 10 mg tablet Take 1 Tablet by mouth daily. 30 Tablet 0    triamcinolone acetonide (KENALOG) 0.1 % ointment Apply to affected area daily (Patient not taking: Reported on 6/21/2022) 80 g 0    labetaloL (NORMODYNE) 300 mg tablet TAKE 1 TABLET BY MOUTH TWICE DAILY FOR HIGH BLOOD PRESSURE 180 Tablet 3    lidocaine (LIDODERM) 5 % Apply patch to the affected area for 12 hours a day and remove for 12 hours a day. 5 Each 0    pantoprazole (PROTONIX) 40 mg tablet Take 1 Tab by mouth daily. (Patient not taking: Reported on 6/21/2022) 30 Tab 0    metroNIDAZOLE (FLAGYL) 500 mg tablet TK 1 T PO BID (Patient not taking: Reported on 6/21/2022)      spironolactone (ALDACTONE) 25 mg tablet Take 1 Tab by mouth daily.  (Patient not taking: Reported on 6/21/2022) 30 Tab 6    predniSONE (DELTASONE) 20 mg tablet Take 20 mg by mouth as needed (as needed for allergic reaction). (Patient not taking: Reported on 6/21/2022)  0    valACYclovir (VALTREX) 500 mg tablet  (Patient not taking: Reported on 6/21/2022)  1    PROAIR HFA 90 mcg/actuation inhaler inhale 2 puffs by mouth every 4 hours (Patient not taking: Reported on 6/21/2022)  0       Past History     Past Medical History:  Past Medical History:   Diagnosis Date    Abscess 7/11/2016    CAD (coronary artery disease)     enlarged heart    Delivery normal     Hiatal hernia 6/10/2019    Hypertension     Psychiatric disorder     depresssion    Tubal ligation status 12/10/15       Past Surgical History:  Past Surgical History:   Procedure Laterality Date    HX GYN      tubal ligation    HX ORTHOPAEDIC      tubal pregnancy removal    HX OTHER SURGICAL      abscesses       Family History:  No family history on file. Social History:  Social History     Tobacco Use    Smoking status: Some Days     Packs/day: 0.25     Types: Cigarettes    Smokeless tobacco: Never    Tobacco comments:     only when drinking   Substance Use Topics    Alcohol use: Yes     Comment: socially    Drug use: No       Allergies: Allergies   Allergen Reactions    Other Food Anaphylaxis     Old Bey seasoning    Iodine Itching    Shellfish Containing Products Itching         Review of Systems   Review of Systems   Constitutional:  Negative for chills and fever. Gastrointestinal:  Positive for nausea. Negative for vomiting. Genitourinary:  Positive for dysuria, flank pain and frequency. Allergic/Immunologic: Negative for immunocompromised state. Neurological:  Positive for headaches. Negative for speech difficulty and weakness. All other systems reviewed and are negative.     Physical Exam     Vitals:    09/26/22 1513   BP: (!) 165/95   Pulse: 95   Resp: 13   Temp: 99.4 °F (37.4 °C)   SpO2: 100%   Weight: 95.7 kg (211 lb)   Height: 5' (1.524 m)     Physical Exam  Vitals and nursing note reviewed. Constitutional:       General: She is not in acute distress. Appearance: She is well-developed. HENT:      Head: Normocephalic and atraumatic. Eyes:      Conjunctiva/sclera: Conjunctivae normal.   Cardiovascular:      Rate and Rhythm: Normal rate and regular rhythm. Heart sounds: Normal heart sounds. Pulmonary:      Effort: Pulmonary effort is normal. No respiratory distress. Breath sounds: Normal breath sounds. No wheezing or rales. Abdominal:      General: Bowel sounds are normal. There is no distension. Palpations: Abdomen is soft. Tenderness: There is no abdominal tenderness. There is no left CVA tenderness (but pain in the L flank area), guarding or rebound. Skin:     General: Skin is warm and dry. Neurological:      Mental Status: She is alert and oriented to person, place, and time. Psychiatric:         Behavior: Behavior normal.         Thought Content: Thought content normal.         Judgment: Judgment normal.             Medical Decision Making   I am the first provider for this patient. I reviewed the vital signs, available nursing notes, past medical history, past surgical history, family history and social history. Vital Signs-Reviewed the patient's vital signs. Records Reviewed: Nursing Notes and Old Medical Records    Provider Notes (Medical Decision Making):   Patient presents with flank pain x2 days with associated dysuria, urinary frequency and cloudy urine. . DDx: renal stone, pyelonephritis, muscular strain, ovarian pathology. Unlikely AAA given the story and prior hx of stones. Will get UA to evaluate for blood and infection and CT abdomen pelvis as patient has not had any issues in the past year. ED Course as of 09/27/22 0015   Mon Sep 26, 2022   1800 Discussed results of labs and CT with patient. We discussed option of doing IV with IV fluids and checking some labs.   In shared decision-making patient opted not to do IV labs at this time but will send home with oral antibiotics and pain meds. Patient advised to return should symptoms worsen, she has any severe nausea or vomiting or high fevers. [AH]      ED Course User Index  [AH] Elyse Charles PA-C            Procedures:  Procedures    Diagnostic Study Results     Labs -     Recent Results (from the past 12 hour(s))   URINALYSIS W/ REFLEX CULTURE    Collection Time: 09/26/22  4:45 PM    Specimen: Urine   Result Value Ref Range    Color YELLOW/STRAW      Appearance TURBID (A) CLEAR      Specific gravity 1.015      pH (UA) 6.5 5.0 - 8.0      Protein 30 (A) NEG mg/dL    Glucose Negative NEG mg/dL    Ketone Negative NEG mg/dL    Bilirubin Negative NEG      Blood MODERATE (A) NEG      Urobilinogen 1.0 0.2 - 1.0 EU/dL    Nitrites Positive (A) NEG      Leukocyte Esterase LARGE (A) NEG      WBC 20-50 0 - 4 /hpf    RBC 5-10 0 - 5 /hpf    Epithelial cells FEW FEW /lpf    Bacteria 1+ (A) NEG /hpf    UA:UC IF INDICATED URINE CULTURE ORDERED (A) CNI     HCG URINE, QL. - POC    Collection Time: 09/26/22  4:52 PM   Result Value Ref Range    Pregnancy test,urine (POC) Negative NEG         Radiologic Studies -   CT ABD PELV WO CONT   Final Result   1. No evidence of renal or ureteral stones. No evidence of hydronephrosis. .      2.  Possible perinephric inflammation around the left kidney. Recommend   correlation for pyelonephritis. CT Results  (Last 48 hours)                 09/26/22 1717  CT ABD PELV WO CONT Final result    Impression:  1. No evidence of renal or ureteral stones. No evidence of hydronephrosis. .       2.  Possible perinephric inflammation around the left kidney. Recommend   correlation for pyelonephritis. Narrative:  EXAM: CT ABD PELV WO CONT       INDICATION: L flank pain, hx of kidney stones       COMPARISON: 5/22/2019       CONTRAST:  None. TECHNIQUE:    Thin axial images were obtained through the abdomen and pelvis.  Coronal and   sagittal reformats were generated. Oral contrast was not administered. CT dose   reduction was achieved through use of a standardized protocol tailored for this   examination and automatic exposure control for dose modulation. The absence of intravenous contrast material reduces the sensitivity for   evaluation of the vasculature and solid organs. FINDINGS:    LOWER THORAX: Concentric thickening of the distal esophagus with a hiatal hernia   without significant change   LIVER: No mass. BILIARY TREE: Gallbladder is within normal limits. CBD is not dilated. SPLEEN: within normal limits. PANCREAS: No focal abnormality. ADRENALS: Unremarkable. KIDNEYS/URETERS: No calculus or hydronephrosis. Subtle perinephric inflammation   of the left kidney. STOMACH: Unremarkable. SMALL BOWEL: No dilatation or wall thickening. COLON: No dilatation or wall thickening. APPENDIX: Unremarkable   PERITONEUM: No ascites or pneumoperitoneum. RETROPERITONEUM: No lymphadenopathy or aortic aneurysm. REPRODUCTIVE ORGANS: Unremarkable   URINARY BLADDER: No mass or calculus. BONES: No destructive bone lesion. ABDOMINAL WALL: No mass or hernia. ADDITIONAL COMMENTS: N/A                 CXR Results  (Last 48 hours)      None                Disposition:  Discharged    DISCHARGE NOTE:   6:03 PM      Care plan outlined and precautions discussed. Patient has no new complaints, changes, or physical findings. Results of labs and imaging were reviewed with the patient. All medications were reviewed with the patient; will d/c home. All of pt's questions and concerns were addressed. Patient was instructed and agrees to follow up with PCP as needed, as well as to return to the ED upon further deterioration. Patient is ready to go home.     Follow-up Information       Follow up With Specialties Details Why Contact Info    Valentín Rangel MD Family Medicine In 1 week As needed [address] DEPT Emergency Medicine  If symptoms worsen [facility]            Discharge Medication List as of 9/26/2022  6:03 PM        START taking these medications    Details   ciprofloxacin HCl (Cipro) 500 mg tablet Take 1 Tablet by mouth two (2) times a day for 7 days. , Normal, Disp-14 Tablet, R-0      phenazopyridine (Pyridium) 200 mg tablet Take 1 Tablet by mouth three (3) times daily for 2 days. , Normal, Disp-6 Tablet, R-0      naproxen (Naprosyn) 500 mg tablet Take 1 Tablet by mouth every twelve (12) hours as needed for Pain for up to 10 days. , Normal, Disp-20 Tablet, R-0           CONTINUE these medications which have NOT CHANGED    Details   amLODIPine (NORVASC) 10 mg tablet Take 1 Tablet by mouth daily. , Normal, Disp-30 Tablet, R-0      triamcinolone acetonide (KENALOG) 0.1 % ointment Apply to affected area daily, Normal, Disp-80 g, R-0      labetaloL (NORMODYNE) 300 mg tablet TAKE 1 TABLET BY MOUTH TWICE DAILY FOR HIGH BLOOD PRESSURE, Normal, Disp-180 Tablet, R-3**Patient requests 90 days supply**      lidocaine (LIDODERM) 5 % Apply patch to the affected area for 12 hours a day and remove for 12 hours a day., Normal, Disp-5 Each, R-0      pantoprazole (PROTONIX) 40 mg tablet Take 1 Tab by mouth daily. , Normal, Disp-30 Tab, R-0      metroNIDAZOLE (FLAGYL) 500 mg tablet TK 1 T PO BID, Historical Med      spironolactone (ALDACTONE) 25 mg tablet Take 1 Tab by mouth daily. , Normal, Disp-30 Tab, R-6      predniSONE (DELTASONE) 20 mg tablet Take 20 mg by mouth as needed (as needed for allergic reaction). , Historical Med, R-0      valACYclovir (VALTREX) 500 mg tablet Historical Med, R-1      PROAIR HFA 90 mcg/actuation inhaler inhale 2 puffs by mouth every 4 hours, Historical Med, R-0, LAURIE               Please note that this dictation was completed with Dragon, computer voice recognition software.   Quite often unanticipated grammatical, syntax, homophones, and other interpretive errors are inadvertently transcribed by the computer software. Please disregard these errors. Additionally, please excuse any errors that have escaped final proofreading. Diagnosis     Clinical Impression:   1.  Pyelonephritis

## 2022-09-29 LAB
BACTERIA SPEC CULT: ABNORMAL
BACTERIA SPEC CULT: ABNORMAL
CC UR VC: ABNORMAL
SERVICE CMNT-IMP: ABNORMAL

## 2023-04-19 NOTE — ED NOTES
Pt given dc instructions and education. Educated on abx and when to seek emergency care. 3 prescriptions sent over to pt preferred pharmacy. Pt verbalized understanding and ambulated out of ER w/ steady gait. Erivedge Counseling- I discussed with the patient the risks of Erivedge including but not limited to nausea, vomiting, diarrhea, constipation, weight loss, changes in the sense of taste, decreased appetite, muscle spasms, and hair loss.  The patient verbalized understanding of the proper use and possible adverse effects of Erivedge.  All of the patient's questions and concerns were addressed.

## 2023-06-07 ENCOUNTER — HOSPITAL ENCOUNTER (EMERGENCY)
Facility: HOSPITAL | Age: 43
Discharge: HOME | End: 2023-06-07

## 2023-06-07 VITALS
DIASTOLIC BLOOD PRESSURE: 96 MMHG | BODY MASS INDEX: 35.15 KG/M2 | TEMPERATURE: 98.2 F | HEART RATE: 90 BPM | RESPIRATION RATE: 20 BRPM | HEIGHT: 62 IN | OXYGEN SATURATION: 100 % | WEIGHT: 191 LBS | SYSTOLIC BLOOD PRESSURE: 170 MMHG

## 2023-06-07 DIAGNOSIS — I10 ESSENTIAL HYPERTENSION: Primary | ICD-10-CM

## 2023-06-07 DIAGNOSIS — Z76.0 ENCOUNTER FOR MEDICATION REFILL: ICD-10-CM

## 2023-06-07 PROCEDURE — 99283 EMERGENCY DEPT VISIT LOW MDM: CPT

## 2023-06-07 RX ORDER — CLONIDINE HYDROCHLORIDE 0.2 MG/1
0.2 TABLET ORAL 2 TIMES DAILY
Qty: 60 TABLET | Refills: 0 | Status: SHIPPED | OUTPATIENT
Start: 2023-06-07 | End: 2023-07-07

## 2023-06-07 RX ORDER — AMLODIPINE BESYLATE 10 MG/1
10 TABLET ORAL DAILY
Qty: 30 TABLET | Refills: 0 | Status: SHIPPED | OUTPATIENT
Start: 2023-06-07

## 2023-06-07 RX ORDER — TRIAMCINOLONE ACETONIDE 1 MG/G
CREAM TOPICAL
Qty: 45 G | Refills: 0 | Status: SHIPPED | OUTPATIENT
Start: 2023-06-07

## 2023-06-07 ASSESSMENT — VISUAL ACUITY: OU: 1

## 2023-06-07 ASSESSMENT — PAIN - FUNCTIONAL ASSESSMENT: PAIN_FUNCTIONAL_ASSESSMENT: NONE - DENIES PAIN

## 2023-06-07 ASSESSMENT — LIFESTYLE VARIABLES
HOW OFTEN DO YOU HAVE A DRINK CONTAINING ALCOHOL: MONTHLY OR LESS
HOW MANY STANDARD DRINKS CONTAINING ALCOHOL DO YOU HAVE ON A TYPICAL DAY: 1 OR 2

## 2023-06-07 ASSESSMENT — ENCOUNTER SYMPTOMS: SHORTNESS OF BREATH: 0

## 2023-06-07 NOTE — ED NOTES
Patient (s)  given copy of dc instructions and 1 script(s). Patient (s)  verbalized understanding of instructions and script (s). Patient given a current medication reconciliation form and verbalized understanding of their medications. Patient (s) verbalized understanding of the importance of discussing medications with his or her physician or clinic they will be following up with. Patient alert and oriented and in no acute distress. Patient discharged home ambulatory with a steady gait unassisted.       Stevenson Zamudio RN  06/07/23 [id number]

## 2023-06-07 NOTE — ED NOTES
Pt presents to ED with CO needing eczema medication refilled. Pt is alert and oriented x 4, RR even and unlabored, skin is warm and dry. Assesment completed and pt updated on plan of care. Emergency Department Nursing Plan of Care       The Nursing Plan of Care is developed from the Nursing assessment and Emergency Department Attending provider initial evaluation. The plan of care may be reviewed in the ED Provider note.     The Plan of Care was developed with the following considerations:   Patient / Family readiness to learn indicated by:verbalized understanding  Persons(s) to be included in education: patient   Barriers to Learning/Limitations:None      Tong Stephenson, RN  06/07/23 [id number]

## 2023-06-07 NOTE — ED PROVIDER NOTES
smoking: only when drinking   Substance Use Topics    Alcohol use: Yes    Drug use: No       Allergies: Allergies   Allergen Reactions    Other Anaphylaxis     Old Bey seasoning    Iodine Itching    Shellfish Allergy Itching       CURRENT MEDICATIONS      Discharge Medication List as of 6/7/2023  6:46 PM        CONTINUE these medications which have NOT CHANGED    Details   albuterol sulfate HFA (PROAIR HFA) 108 (90 Base) MCG/ACT inhaler inhale 2 puffs by mouth every 4 hoursHistorical Med      !! amLODIPine (NORVASC) 10 MG tablet Take 10 mg by mouth dailyHistorical Med      labetalol (NORMODYNE) 300 MG tablet TAKE 1 TABLET BY MOUTH TWICE DAILY FOR HIGH BLOOD PRESSUREHistorical Med      lidocaine (LIDODERM) 5 % Apply patch to the affected area for 12 hours a day and remove for 12 hours a day. Historical Med      metroNIDAZOLE (FLAGYL) 500 MG tablet TK 1 T PO BIDHistorical Med      pantoprazole (PROTONIX) 40 MG tablet Take 40 mg by mouth dailyHistorical Med      predniSONE (DELTASONE) 20 MG tablet Take 20 mg by mouth as neededHistorical Med      spironolactone (ALDACTONE) 25 MG tablet Take 25 mg by mouth dailyHistorical Med      triamcinolone (KENALOG) 0.1 % ointment Apply to affected area daily, Historical Med      valACYclovir (VALTREX) 500 MG tablet ceived the following from [facility]: Outside name: valACYclovir (VALTREX) 500 mg tabletHistorical Med       !! - Potential duplicate medications found. Please discuss with provider. SCREENINGS               No data recorded        PHYSICAL EXAM      ED Triage Vitals [06/07/23 1738]   Enc Vitals Group      BP (!) 170/96      Pulse 90      Respirations 20      Temp 98.2 °F (36.8 °C)      Temp Source Temporal      SpO2 100 %      Weight - Scale 191 lb (86.6 kg)      Height 5' 2\" (1.575 m)      Head Circumference       Peak Flow       Pain Score       Pain Loc       Pain Edu? Excl. in [address]?          Physical Exam  Vitals and nursing note

## 2023-09-30 ENCOUNTER — HOSPITAL ENCOUNTER (EMERGENCY)
Facility: HOSPITAL | Age: 43
Discharge: HOME | End: 2023-09-30
Payer: MEDICARE

## 2023-09-30 VITALS
WEIGHT: 190 LBS | SYSTOLIC BLOOD PRESSURE: 145 MMHG | OXYGEN SATURATION: 100 % | DIASTOLIC BLOOD PRESSURE: 79 MMHG | TEMPERATURE: 97.7 F | RESPIRATION RATE: 18 BRPM | HEART RATE: 82 BPM | HEIGHT: 62 IN | BODY MASS INDEX: 34.96 KG/M2

## 2023-09-30 DIAGNOSIS — N30.00 ACUTE CYSTITIS WITHOUT HEMATURIA: Primary | ICD-10-CM

## 2023-09-30 LAB
APPEARANCE UR: ABNORMAL
BACTERIA URNS QL MICRO: ABNORMAL /HPF
BILIRUB UR QL CFM: NEGATIVE
CAOX CRY URNS QL MICRO: ABNORMAL
CLUE CELLS VAG QL WET PREP: NORMAL
COLOR UR: ABNORMAL
EPITH CASTS URNS QL MICRO: ABNORMAL /LPF
GLUCOSE UR STRIP.AUTO-MCNC: NEGATIVE MG/DL
HCG UR QL: NEGATIVE
HGB UR QL STRIP: NEGATIVE
KETONES UR QL STRIP.AUTO: ABNORMAL MG/DL
KOH PREP SPEC: NORMAL
LEUKOCYTE ESTERASE UR QL STRIP.AUTO: ABNORMAL
MUCOUS THREADS URNS QL MICRO: ABNORMAL /LPF
NITRITE UR QL STRIP.AUTO: NEGATIVE
PH UR STRIP: 5.5 (ref 5–8)
PROT UR STRIP-MCNC: 30 MG/DL
RBC #/AREA URNS HPF: ABNORMAL /HPF (ref 0–5)
SERVICE CMNT-IMP: NORMAL
SP GR UR REFRACTOMETRY: 1.03 (ref 1–1.03)
T VAGINALIS VAG QL WET PREP: NORMAL
URINE CULTURE IF INDICATED: ABNORMAL
UROBILINOGEN UR QL STRIP.AUTO: 1 EU/DL (ref 0.2–1)
WBC URNS QL MICRO: ABNORMAL /HPF (ref 0–4)

## 2023-09-30 PROCEDURE — 87210 SMEAR WET MOUNT SALINE/INK: CPT

## 2023-09-30 PROCEDURE — 81001 URINALYSIS AUTO W/SCOPE: CPT

## 2023-09-30 PROCEDURE — 81025 URINE PREGNANCY TEST: CPT

## 2023-09-30 PROCEDURE — 87491 CHLMYD TRACH DNA AMP PROBE: CPT

## 2023-09-30 PROCEDURE — 87591 N.GONORRHOEAE DNA AMP PROB: CPT

## 2023-09-30 PROCEDURE — 99283 EMERGENCY DEPT VISIT LOW MDM: CPT

## 2023-09-30 RX ORDER — CEPHALEXIN 500 MG/1
500 CAPSULE ORAL 2 TIMES DAILY
Qty: 14 CAPSULE | Refills: 0 | Status: SHIPPED | OUTPATIENT
Start: 2023-09-30 | End: 2023-10-07

## 2023-09-30 ASSESSMENT — PAIN DESCRIPTION - ORIENTATION: ORIENTATION: LOWER

## 2023-09-30 ASSESSMENT — PAIN - FUNCTIONAL ASSESSMENT: PAIN_FUNCTIONAL_ASSESSMENT: 0-10

## 2023-09-30 ASSESSMENT — PAIN SCALES - GENERAL: PAINLEVEL_OUTOF10: 8

## 2023-09-30 ASSESSMENT — PAIN DESCRIPTION - PAIN TYPE: TYPE: ACUTE PAIN

## 2023-09-30 ASSESSMENT — PAIN DESCRIPTION - DESCRIPTORS: DESCRIPTORS: ACHING

## 2023-09-30 ASSESSMENT — PAIN DESCRIPTION - LOCATION: LOCATION: BACK

## 2023-09-30 NOTE — ED NOTES
General: pt appeared well  VS: VSS, pt with a hx of HTN  Neuro: wdl  HEENT:wdl  Cardiovascular: HTN  Respiratory: wdl  Abdominal: wdl  Genitourinary:pt stated she has been having vaginal discharge for a few days, with yellow/grayish discharge, pt denies odor. Pt endorses burning/itching with urination and urinary frequency. Musculoskeletal: Pt endorsed lower back pain, pt has been taking Tylenol and Motrin with little relief.  Nothing makes pain better or worse  Psych: appropriate behaviour    Appearance: unremarkable       Yazmin Ricketts RN  09/30/23 1951

## 2023-10-01 NOTE — DISCHARGE INSTRUCTIONS
Your swabs are negative for yeast, BV and trichomoniasis. STD results are available in 2-3 days. We will call if positive or check your mychart       It was a pleasure taking care of you at [facility] Emergency Department today. We know that when you come to [facility], you are entrusting us with your health, comfort, and safety. Our physicians and nurses honor that trust, and we truly appreciate the opportunity to care for you and your loved ones. We also value our feedback. If you receive a survey about your Emergency Department experience today, please fill it out. We care about our patients' feedback, and we listen to what you have to say. Thank you!

## 2023-10-01 NOTE — ED NOTES
Discharge instructions reviewed with patient. Opportunity for additional questions provided. Patient did not have any further questions at this time. Pt is leaving dept in stable condition.        Manjinder Sandoval RN  09/30/23 2048

## 2023-10-02 LAB
C TRACH DNA SPEC QL NAA+PROBE: NEGATIVE
N GONORRHOEA DNA SPEC QL NAA+PROBE: NEGATIVE
SAMPLE TYPE: NORMAL
SERVICE CMNT-IMP: NORMAL
SPECIMEN SOURCE: NORMAL

## 2024-03-26 ENCOUNTER — APPOINTMENT (OUTPATIENT)
Facility: HOSPITAL | Age: 44
End: 2024-03-26
Payer: MEDICARE

## 2024-03-26 ENCOUNTER — HOSPITAL ENCOUNTER (EMERGENCY)
Facility: HOSPITAL | Age: 44
Discharge: HOME | End: 2024-03-26
Attending: STUDENT IN AN ORGANIZED HEALTH CARE EDUCATION/TRAINING PROGRAM
Payer: MEDICARE

## 2024-03-26 VITALS
DIASTOLIC BLOOD PRESSURE: 93 MMHG | HEIGHT: 62 IN | SYSTOLIC BLOOD PRESSURE: 157 MMHG | WEIGHT: 186 LBS | TEMPERATURE: 98 F | OXYGEN SATURATION: 100 % | HEART RATE: 78 BPM | BODY MASS INDEX: 34.23 KG/M2 | RESPIRATION RATE: 20 BRPM

## 2024-03-26 DIAGNOSIS — D64.9 ANEMIA, UNSPECIFIED TYPE: ICD-10-CM

## 2024-03-26 DIAGNOSIS — R11.0 NAUSEA: Primary | ICD-10-CM

## 2024-03-26 DIAGNOSIS — R14.0 ABDOMINAL BLOATING: ICD-10-CM

## 2024-03-26 LAB
ALBUMIN SERPL-MCNC: 3.5 G/DL (ref 3.5–5)
ALBUMIN/GLOB SERPL: 0.9 (ref 1.1–2.2)
ALP SERPL-CCNC: 73 U/L (ref 45–117)
ALT SERPL-CCNC: 25 U/L (ref 12–78)
ANION GAP SERPL CALC-SCNC: 11 MMOL/L (ref 5–15)
APPEARANCE UR: ABNORMAL
AST SERPL-CCNC: 17 U/L (ref 15–37)
BACTERIA URNS QL MICRO: ABNORMAL /HPF
BASOPHILS # BLD: 0.1 K/UL (ref 0–0.1)
BASOPHILS NFR BLD: 1 % (ref 0–1)
BILIRUB SERPL-MCNC: 0.3 MG/DL (ref 0.2–1)
BILIRUB UR QL: NEGATIVE
BUN SERPL-MCNC: 17 MG/DL (ref 6–20)
BUN/CREAT SERPL: 22 (ref 12–20)
CALCIUM SERPL-MCNC: 8.5 MG/DL (ref 8.5–10.1)
CHLORIDE SERPL-SCNC: 105 MMOL/L (ref 97–108)
CO2 SERPL-SCNC: 25 MMOL/L (ref 21–32)
COLOR UR: ABNORMAL
CREAT SERPL-MCNC: 0.77 MG/DL (ref 0.55–1.02)
DIFFERENTIAL METHOD BLD: ABNORMAL
EOSINOPHIL # BLD: 0.3 K/UL (ref 0–0.4)
EOSINOPHIL NFR BLD: 3 % (ref 0–7)
EPITH CASTS URNS QL MICRO: ABNORMAL /LPF
ERYTHROCYTE [DISTWIDTH] IN BLOOD BY AUTOMATED COUNT: 19.3 % (ref 11.5–14.5)
GLOBULIN SER CALC-MCNC: 3.8 G/DL (ref 2–4)
GLUCOSE SERPL-MCNC: 96 MG/DL (ref 65–100)
GLUCOSE UR STRIP.AUTO-MCNC: NEGATIVE MG/DL
HCG UR QL: NEGATIVE
HCT VFR BLD AUTO: 27 % (ref 35–47)
HGB BLD-MCNC: 7.4 G/DL (ref 11.5–16)
HGB UR QL STRIP: NEGATIVE
IMM GRANULOCYTES # BLD AUTO: 0 K/UL (ref 0–0.04)
IMM GRANULOCYTES NFR BLD AUTO: 0 % (ref 0–0.5)
KETONES UR QL STRIP.AUTO: NEGATIVE MG/DL
LEUKOCYTE ESTERASE UR QL STRIP.AUTO: ABNORMAL
LIPASE SERPL-CCNC: 31 U/L (ref 13–75)
LYMPHOCYTES # BLD: 2.9 K/UL (ref 0.8–3.5)
LYMPHOCYTES NFR BLD: 31 % (ref 12–49)
MCH RBC QN AUTO: 17.8 PG (ref 26–34)
MCHC RBC AUTO-ENTMCNC: 27.4 G/DL (ref 30–36.5)
MCV RBC AUTO: 64.9 FL (ref 80–99)
MONOCYTES # BLD: 0.8 K/UL (ref 0–1)
MONOCYTES NFR BLD: 8 % (ref 5–13)
MUCOUS THREADS URNS QL MICRO: ABNORMAL /LPF
NEUTS SEG # BLD: 5.3 K/UL (ref 1.8–8)
NEUTS SEG NFR BLD: 57 % (ref 32–75)
NITRITE UR QL STRIP.AUTO: NEGATIVE
NRBC # BLD: 0 K/UL (ref 0–0.01)
NRBC BLD-RTO: 0 PER 100 WBC
PH UR STRIP: 6.5 (ref 5–8)
PLATELET # BLD AUTO: 379 K/UL (ref 150–400)
PMV BLD AUTO: 9.1 FL (ref 8.9–12.9)
POTASSIUM SERPL-SCNC: 4 MMOL/L (ref 3.5–5.1)
PROT SERPL-MCNC: 7.3 G/DL (ref 6.4–8.2)
PROT UR STRIP-MCNC: NEGATIVE MG/DL
RBC # BLD AUTO: 4.16 M/UL (ref 3.8–5.2)
RBC #/AREA URNS HPF: ABNORMAL /HPF (ref 0–5)
RBC MORPH BLD: ABNORMAL
RBC MORPH BLD: ABNORMAL
SODIUM SERPL-SCNC: 141 MMOL/L (ref 136–145)
SP GR UR REFRACTOMETRY: 1.02
URINE CULTURE IF INDICATED: ABNORMAL
UROBILINOGEN UR QL STRIP.AUTO: 0.2 EU/DL (ref 0.2–1)
WBC # BLD AUTO: 9.4 K/UL (ref 3.6–11)
WBC URNS QL MICRO: ABNORMAL /HPF (ref 0–4)

## 2024-03-26 PROCEDURE — 96375 TX/PRO/DX INJ NEW DRUG ADDON: CPT

## 2024-03-26 PROCEDURE — 36415 COLL VENOUS BLD VENIPUNCTURE: CPT

## 2024-03-26 PROCEDURE — 81025 URINE PREGNANCY TEST: CPT

## 2024-03-26 PROCEDURE — 96361 HYDRATE IV INFUSION ADD-ON: CPT

## 2024-03-26 PROCEDURE — A4216 STERILE WATER/SALINE, 10 ML: HCPCS | Performed by: STUDENT IN AN ORGANIZED HEALTH CARE EDUCATION/TRAINING PROGRAM

## 2024-03-26 PROCEDURE — 6360000002 HC RX W HCPCS: Performed by: STUDENT IN AN ORGANIZED HEALTH CARE EDUCATION/TRAINING PROGRAM

## 2024-03-26 PROCEDURE — 74018 RADEX ABDOMEN 1 VIEW: CPT

## 2024-03-26 PROCEDURE — 81001 URINALYSIS AUTO W/SCOPE: CPT

## 2024-03-26 PROCEDURE — 2580000003 HC RX 258: Performed by: STUDENT IN AN ORGANIZED HEALTH CARE EDUCATION/TRAINING PROGRAM

## 2024-03-26 PROCEDURE — 80053 COMPREHEN METABOLIC PANEL: CPT

## 2024-03-26 PROCEDURE — 96374 THER/PROPH/DIAG INJ IV PUSH: CPT

## 2024-03-26 PROCEDURE — 85025 COMPLETE CBC W/AUTO DIFF WBC: CPT

## 2024-03-26 PROCEDURE — 2500000003 HC RX 250 WO HCPCS: Performed by: STUDENT IN AN ORGANIZED HEALTH CARE EDUCATION/TRAINING PROGRAM

## 2024-03-26 PROCEDURE — 99284 EMERGENCY DEPT VISIT MOD MDM: CPT

## 2024-03-26 PROCEDURE — 83690 ASSAY OF LIPASE: CPT

## 2024-03-26 RX ORDER — CLONIDINE HYDROCHLORIDE 0.2 MG/1
0.2 TABLET ORAL 2 TIMES DAILY
Qty: 30 TABLET | Refills: 0 | Status: SHIPPED | OUTPATIENT
Start: 2024-03-26

## 2024-03-26 RX ORDER — POLYETHYLENE GLYCOL 3350 17 G/17G
17 POWDER, FOR SOLUTION ORAL DAILY PRN
Qty: 30 PACKET | Refills: 0 | Status: SHIPPED | OUTPATIENT
Start: 2024-03-26 | End: 2024-04-25

## 2024-03-26 RX ORDER — METOCLOPRAMIDE 10 MG/1
10 TABLET ORAL
Qty: 120 TABLET | Refills: 0 | Status: SHIPPED | OUTPATIENT
Start: 2024-03-26

## 2024-03-26 RX ORDER — FAMOTIDINE 20 MG/1
20 TABLET, FILM COATED ORAL DAILY
Qty: 30 TABLET | Refills: 0 | Status: SHIPPED | OUTPATIENT
Start: 2024-03-26

## 2024-03-26 RX ORDER — AMLODIPINE BESYLATE 10 MG/1
10 TABLET ORAL DAILY
Qty: 30 TABLET | Refills: 0 | Status: SHIPPED | OUTPATIENT
Start: 2024-03-26

## 2024-03-26 RX ORDER — ONDANSETRON 2 MG/ML
4 INJECTION INTRAMUSCULAR; INTRAVENOUS ONCE
Status: COMPLETED | OUTPATIENT
Start: 2024-03-26 | End: 2024-03-26

## 2024-03-26 RX ORDER — 0.9 % SODIUM CHLORIDE 0.9 %
1000 INTRAVENOUS SOLUTION INTRAVENOUS ONCE
Status: COMPLETED | OUTPATIENT
Start: 2024-03-26 | End: 2024-03-26

## 2024-03-26 RX ADMIN — FAMOTIDINE 20 MG: 10 INJECTION, SOLUTION INTRAVENOUS at 18:15

## 2024-03-26 RX ADMIN — SODIUM CHLORIDE 1000 ML: 9 INJECTION, SOLUTION INTRAVENOUS at 18:10

## 2024-03-26 RX ADMIN — ONDANSETRON 4 MG: 2 INJECTION INTRAMUSCULAR; INTRAVENOUS at 18:13

## 2024-03-26 ASSESSMENT — LIFESTYLE VARIABLES
HOW MANY STANDARD DRINKS CONTAINING ALCOHOL DO YOU HAVE ON A TYPICAL DAY: 1 OR 2
HOW OFTEN DO YOU HAVE A DRINK CONTAINING ALCOHOL: MONTHLY OR LESS

## 2024-03-26 ASSESSMENT — PAIN - FUNCTIONAL ASSESSMENT: PAIN_FUNCTIONAL_ASSESSMENT: NONE - DENIES PAIN

## 2024-03-26 NOTE — ED TRIAGE NOTES
Pt presents to ED with multiple c/c     Pt reports needing refills on her amlodipine 10mg and clonidine 0.2. Pt reports being out of them for x1 month.     Pt reports having abd discomfort and nausea x2 days. Pt reports LMP today and is passing gas. Pt reports LMP was abnormal and was approximally on 2/5/24 pt states \"I feel like something is in there. I'm worried for a tubal pregnancy.\"

## 2024-03-26 NOTE — ED NOTES
Pt given discharge papers. E prescriptions sent to patients pharmacy. Pt educated on discharge papers and prescriptions. Pt instructed to follow up with Gastroenterology and PCP (for BP management) . Pt verbalizes understanding and denies any further questions. Pt ambulated to waiting room with steady gait, alert and oriented x4, PIV removed.

## 2024-03-26 NOTE — ED PROVIDER NOTES
sodium chloride 0.9 % bolus 1,000 mL (1,000 mLs IntraVENous New Bag 3/26/24 1810)   ondansetron (ZOFRAN) injection 4 mg (4 mg IntraVENous Given 3/26/24 1813)   famotidine (PEPCID) 20 mg in sodium chloride (PF) 0.9 % 10 mL injection (20 mg IntraVENous Given 3/26/24 1815)        ED Course as of 03/26/24 1906   Tue Mar 26, 2024   1836 Abdominal x-ray is independently interpreted by myself as no air-fluid levels, stool in colon [CM]      ED Course User Index  [CM] Tg Dye MD        I reviewed her stress test from 2018, this shows EF of 62%, left ventricular hypertrophy.    Base metabolic panel with normal creatinine of 0.77, no worrisome electrolyte abnormalities, no abnormalities of LFTs.  Hemoglobin is low at 7.4.  Patient states that she has a known history of anemia.  She has heavy menstrual cycles usually, and has fibroids.  She follows with an OB/GYN.  She is unsure what her baseline hemoglobin is.  She has not noticed any recent black or bloody stools or any other bleeding.  No current vaginal bleeding.  She is informed of her anemia, and need for close follow-up with her OB/GYN as well as her primary care doctor.  No signs or symptoms currently of symptomatic anemia or active blood loss, her vitals are reassuring, hypertensive as above otherwise unremarkable.  UA is not suggestive of UTI.    Patient is resting comfortably on reevaluation.  Patient is counseled on supportive care and return precautions. Will return to the ED for any worsening pain, vomiting, fever, bleeding, lightheadedness, or any new or worrisome symptoms. Will followup with OB/GYN, primary care doctor within 2 days.    Critical Care Time:         Disposition:  Home  {Zohra CHEN Parminder's  results have been reviewed with her.  She has been counseled regarding her diagnosis, treatment, and plan.  She verbally conveys understanding and agreement of the signs, symptoms, diagnosis, treatment and prognosis and additionally agrees to

## 2024-03-26 NOTE — ED NOTES
See triage notes.     Pt states \"My tubes have been tied years ago and I want to make sure.\" \" I have had swelling in my feet and stomach and I need to make [place] that there is nothing in there\". Pt also reports nausea and vomiting, lightheadedness.  Pt takes Amlodipine and Clonidine for Hypertension.     Pt is alert and oriented x 4, RR even and unlabored, skin is warm and dry. Assesment completed and pt updated on plan of care.       Emergency Department Nursing Plan of Care       The Nursing Plan of Care is developed from the Nursing assessment and Emergency Department Attending provider initial evaluation.  The plan of care may be reviewed in the “ED Provider note”.    The Plan of Care was developed with the following considerations:   Patient / Family readiness to learn indicated by:verbalized understanding  Persons(s) to be included in education: patient  Barriers to Learning/Limitations:None    Signed     Andrew Etienne RN    3/26/2024   5:37 PM

## 2024-12-27 ENCOUNTER — APPOINTMENT (OUTPATIENT)
Facility: HOSPITAL | Age: 44
End: 2024-12-27
Payer: MEDICARE

## 2024-12-27 ENCOUNTER — HOSPITAL ENCOUNTER (EMERGENCY)
Facility: HOSPITAL | Age: 44
Discharge: HOME | End: 2024-12-27
Payer: MEDICARE

## 2024-12-27 VITALS
SYSTOLIC BLOOD PRESSURE: 149 MMHG | DIASTOLIC BLOOD PRESSURE: 97 MMHG | HEIGHT: 61 IN | HEART RATE: 82 BPM | TEMPERATURE: 98.4 F | RESPIRATION RATE: 22 BRPM | WEIGHT: 174 LBS | OXYGEN SATURATION: 100 % | BODY MASS INDEX: 32.85 KG/M2

## 2024-12-27 DIAGNOSIS — R79.89 ELEVATED TROPONIN: ICD-10-CM

## 2024-12-27 DIAGNOSIS — R07.9 ACUTE CHEST PAIN: Primary | ICD-10-CM

## 2024-12-27 DIAGNOSIS — I10 HYPERTENSION, UNSPECIFIED TYPE: ICD-10-CM

## 2024-12-27 DIAGNOSIS — Z76.0 ENCOUNTER FOR MEDICATION REFILL: ICD-10-CM

## 2024-12-27 DIAGNOSIS — D64.9 ANEMIA, UNSPECIFIED TYPE: ICD-10-CM

## 2024-12-27 LAB
ALBUMIN SERPL-MCNC: 3.3 G/DL (ref 3.5–5)
ALBUMIN/GLOB SERPL: 0.8 (ref 1.1–2.2)
ALP SERPL-CCNC: 71 U/L (ref 45–117)
ALT SERPL-CCNC: 24 U/L (ref 12–78)
ANION GAP SERPL CALC-SCNC: 12 MMOL/L (ref 2–12)
APPEARANCE UR: ABNORMAL
AST SERPL-CCNC: 22 U/L (ref 15–37)
BACTERIA URNS QL MICRO: NEGATIVE /HPF
BASOPHILS # BLD: 0.1 K/UL (ref 0–0.1)
BASOPHILS NFR BLD: 1 % (ref 0–1)
BILIRUB SERPL-MCNC: 0.2 MG/DL (ref 0.2–1)
BILIRUB UR QL: NEGATIVE
BUN SERPL-MCNC: 14 MG/DL (ref 6–20)
BUN/CREAT SERPL: 19 (ref 12–20)
CALCIUM SERPL-MCNC: 8.5 MG/DL (ref 8.5–10.1)
CHLORIDE SERPL-SCNC: 102 MMOL/L (ref 97–108)
CO2 SERPL-SCNC: 22 MMOL/L (ref 21–32)
COLOR UR: ABNORMAL
CREAT SERPL-MCNC: 0.72 MG/DL (ref 0.55–1.02)
DIFFERENTIAL METHOD BLD: ABNORMAL
EKG ATRIAL RATE: 78 BPM
EKG DIAGNOSIS: NORMAL
EKG P AXIS: 28 DEGREES
EKG P-R INTERVAL: 148 MS
EKG Q-T INTERVAL: 392 MS
EKG QRS DURATION: 80 MS
EKG QTC CALCULATION (BAZETT): 446 MS
EKG R AXIS: -15 DEGREES
EKG T AXIS: 119 DEGREES
EKG VENTRICULAR RATE: 78 BPM
EOSINOPHIL # BLD: 0.1 K/UL (ref 0–0.4)
EOSINOPHIL NFR BLD: 1 % (ref 0–7)
EPITH CASTS URNS QL MICRO: ABNORMAL /LPF
ERYTHROCYTE [DISTWIDTH] IN BLOOD BY AUTOMATED COUNT: 29.3 % (ref 11.5–14.5)
GLOBULIN SER CALC-MCNC: 3.9 G/DL (ref 2–4)
GLUCOSE SERPL-MCNC: 87 MG/DL (ref 65–100)
GLUCOSE UR STRIP.AUTO-MCNC: NEGATIVE MG/DL
HCG UR QL: NEGATIVE
HCT VFR BLD AUTO: 25.8 % (ref 35–47)
HGB BLD-MCNC: 7.1 G/DL (ref 11.5–16)
HGB UR QL STRIP: NEGATIVE
IMM GRANULOCYTES # BLD AUTO: 0.1 K/UL (ref 0–0.04)
IMM GRANULOCYTES NFR BLD AUTO: 1 % (ref 0–0.5)
KETONES UR QL STRIP.AUTO: NEGATIVE MG/DL
LEUKOCYTE ESTERASE UR QL STRIP.AUTO: NEGATIVE
LIPASE SERPL-CCNC: 27 U/L (ref 13–75)
LYMPHOCYTES # BLD: 2.4 K/UL (ref 0.8–3.5)
LYMPHOCYTES NFR BLD: 26 % (ref 12–49)
MCH RBC QN AUTO: 19 PG (ref 26–34)
MCHC RBC AUTO-ENTMCNC: 27.5 G/DL (ref 30–36.5)
MCV RBC AUTO: 69.2 FL (ref 80–99)
MONOCYTES # BLD: 0.6 K/UL (ref 0–1)
MONOCYTES NFR BLD: 6 % (ref 5–13)
NEUTS SEG # BLD: 6 K/UL (ref 1.8–8)
NEUTS SEG NFR BLD: 65 % (ref 32–75)
NITRITE UR QL STRIP.AUTO: NEGATIVE
NRBC # BLD: 0 K/UL (ref 0–0.01)
NRBC BLD-RTO: 0 PER 100 WBC
NT PRO BNP: 413 PG/ML (ref 0–125)
PH UR STRIP: 6.5 (ref 5–8)
PLATELET # BLD AUTO: 857 K/UL (ref 150–400)
POTASSIUM SERPL-SCNC: 3.9 MMOL/L (ref 3.5–5.1)
PROT SERPL-MCNC: 7.2 G/DL (ref 6.4–8.2)
PROT UR STRIP-MCNC: NEGATIVE MG/DL
RBC # BLD AUTO: 3.73 M/UL (ref 3.8–5.2)
RBC #/AREA URNS HPF: ABNORMAL /HPF (ref 0–5)
RBC MORPH BLD: ABNORMAL
RBC MORPH BLD: ABNORMAL
SODIUM SERPL-SCNC: 136 MMOL/L (ref 136–145)
SP GR UR REFRACTOMETRY: 1.01
TROPONIN I SERPL HS-MCNC: 57 NG/L (ref 0–51)
TROPONIN I SERPL HS-MCNC: 63 NG/L (ref 0–51)
UROBILINOGEN UR QL STRIP.AUTO: 0.2 EU/DL (ref 0.2–1)
WBC # BLD AUTO: 9.3 K/UL (ref 3.6–11)
WBC URNS QL MICRO: ABNORMAL /HPF (ref 0–4)

## 2024-12-27 PROCEDURE — 36415 COLL VENOUS BLD VENIPUNCTURE: CPT

## 2024-12-27 PROCEDURE — 83690 ASSAY OF LIPASE: CPT

## 2024-12-27 PROCEDURE — 80053 COMPREHEN METABOLIC PANEL: CPT

## 2024-12-27 PROCEDURE — 6360000002 HC RX W HCPCS: Performed by: PHYSICIAN ASSISTANT

## 2024-12-27 PROCEDURE — 93005 ELECTROCARDIOGRAM TRACING: CPT | Performed by: EMERGENCY MEDICINE

## 2024-12-27 PROCEDURE — 81002 URINALYSIS NONAUTO W/O SCOPE: CPT

## 2024-12-27 PROCEDURE — 87086 URINE CULTURE/COLONY COUNT: CPT

## 2024-12-27 PROCEDURE — 96374 THER/PROPH/DIAG INJ IV PUSH: CPT

## 2024-12-27 PROCEDURE — 6370000000 HC RX 637 (ALT 250 FOR IP)

## 2024-12-27 PROCEDURE — 81025 URINE PREGNANCY TEST: CPT

## 2024-12-27 PROCEDURE — 2580000003 HC RX 258: Performed by: PHYSICIAN ASSISTANT

## 2024-12-27 PROCEDURE — 85025 COMPLETE CBC W/AUTO DIFF WBC: CPT

## 2024-12-27 PROCEDURE — 83880 ASSAY OF NATRIURETIC PEPTIDE: CPT

## 2024-12-27 PROCEDURE — 96375 TX/PRO/DX INJ NEW DRUG ADDON: CPT

## 2024-12-27 PROCEDURE — 84484 ASSAY OF TROPONIN QUANT: CPT

## 2024-12-27 PROCEDURE — 71045 X-RAY EXAM CHEST 1 VIEW: CPT

## 2024-12-27 PROCEDURE — 99285 EMERGENCY DEPT VISIT HI MDM: CPT

## 2024-12-27 RX ORDER — KETOROLAC TROMETHAMINE 30 MG/ML
15 INJECTION, SOLUTION INTRAMUSCULAR; INTRAVENOUS ONCE
Status: COMPLETED | OUTPATIENT
Start: 2024-12-27 | End: 2024-12-27

## 2024-12-27 RX ORDER — ALBUTEROL SULFATE 90 UG/1
2 INHALANT RESPIRATORY (INHALATION) EVERY 4 HOURS PRN
Qty: 18 G | Refills: 0 | Status: SHIPPED | OUTPATIENT
Start: 2024-12-27

## 2024-12-27 RX ORDER — FERROUS SULFATE 325(65) MG
325 TABLET ORAL
Qty: 90 TABLET | Refills: 0 | Status: SHIPPED | OUTPATIENT
Start: 2024-12-27

## 2024-12-27 RX ORDER — PANTOPRAZOLE SODIUM 40 MG/1
40 TABLET, DELAYED RELEASE ORAL DAILY
Qty: 30 TABLET | Refills: 0 | Status: SHIPPED | OUTPATIENT
Start: 2024-12-27

## 2024-12-27 RX ORDER — ASPIRIN 81 MG/1
162 TABLET, CHEWABLE ORAL ONCE
Status: COMPLETED | OUTPATIENT
Start: 2024-12-27 | End: 2024-12-27

## 2024-12-27 RX ORDER — AMLODIPINE BESYLATE 10 MG/1
10 TABLET ORAL DAILY
Qty: 30 TABLET | Refills: 0 | Status: SHIPPED | OUTPATIENT
Start: 2024-12-27

## 2024-12-27 RX ORDER — CLONIDINE HYDROCHLORIDE 0.2 MG/1
0.2 TABLET ORAL 2 TIMES DAILY
Qty: 30 TABLET | Refills: 0 | Status: SHIPPED | OUTPATIENT
Start: 2024-12-27

## 2024-12-27 RX ORDER — ASPIRIN 81 MG/1
TABLET, CHEWABLE ORAL
Status: COMPLETED
Start: 2024-12-27 | End: 2024-12-27

## 2024-12-27 RX ADMIN — ASPIRIN 162 MG: 81 TABLET, CHEWABLE ORAL at 18:10

## 2024-12-27 RX ADMIN — SODIUM CHLORIDE 40 MG: 9 INJECTION INTRAMUSCULAR; INTRAVENOUS; SUBCUTANEOUS at 20:01

## 2024-12-27 RX ADMIN — KETOROLAC TROMETHAMINE 15 MG: 30 INJECTION, SOLUTION INTRAMUSCULAR at 20:00

## 2024-12-27 ASSESSMENT — PAIN - FUNCTIONAL ASSESSMENT: PAIN_FUNCTIONAL_ASSESSMENT: NONE - DENIES PAIN

## 2024-12-27 ASSESSMENT — PAIN SCALES - GENERAL
PAINLEVEL_OUTOF10: 0
PAINLEVEL_OUTOF10: 0

## 2024-12-27 ASSESSMENT — HEART SCORE: ECG: NON-SPECIFC REPOLARIZATION DISTURBANCE/LBTB/PM

## 2024-12-27 NOTE — ED TRIAGE NOTES
Pt c/o episode of right sided chest pain while sitting at work today. +SOB with pain. Previous similar episode 3 days ago as well.

## 2024-12-27 NOTE — ED PROVIDER NOTES
[facility] EMERGENCY DEPT  EMERGENCY DEPARTMENT ENCOUNTER       Pt Name: Zohra Zurita  MRN: [id number]  Birthdate 1980  Date of evaluation: 12/27/2024  Provider: GLORY Carl   PCP: Reynold Donohue MD  Note Started: 6:40 PM EST 12/27/24     CHIEF COMPLAINT       Chief Complaint   Patient presents with    Chest Pain        HISTORY OF PRESENT ILLNESS: 1 or more elements      History From: Patient  None     Zohra Zurita is a 44 y.o. female with Pmhx anemia, HTN, hypertrophic cardiomyopathy and additional history as noted below, who presents to the ED for evaluation of intermittent chest pain.  Patient states she was at work today and had an episode of sharp right-sided chest pain.  States the pain did seem to take her breath away, but denied difficulty breathing.  Denies wheezing.  States he had a similar episode 3 days ago but it was on the left side of her chest.  Denies exertional symptoms.  Denies any chest pain at this time.  Denies leg pain or swelling, recent travel or injuries, trauma or surgeries, history of blood clots.  States he has a history of hypertrophic cardiomyopathy and was previously followed by cardiology but states that has been on while since she is been seen and she actually has an appointment for follow-up in the near future.  Of note, states she is recently run out of her hypertensive medications a few weeks ago and would like those refilled today.  Denies fevers, abdominal pain, nausea, vomiting, diarrhea, changes in bowel or bladder habits.     Nursing Notes were all reviewed and agreed with or any disagreements were addressed in the HPI.     REVIEW OF SYSTEMS      Review of Systems   All other systems reviewed and are negative.       Positives and Pertinent negatives as per HPI.    PAST HISTORY     Past Medical History:  Past Medical History:   Diagnosis Date    Abscess 7/11/2016    CAD (coronary artery disease)     enlarged heart    Delivery normal     Hiatal hernia 6/10/2019

## 2024-12-27 NOTE — ED NOTES
Patient here with c/o chest pain.  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  Patient states she felt similar pains 3 days ago in her left lower chest below her left breast.  Patient states \"now its on the right side today.\"  Patient states that she left work and went home to rest, but states that when she had trouble standing up to walking she decided to come to the hospital.  Patient states that 2-3 coworkers have been coming to work sick, patient states that she has had a productive cough with white sputum.  Patient states that she is a smoker and goes through about 3 packs a week.  Patient denies fevers, denies N/V/D.      Patient states that she was diagnosed with an enlarged heart and rhythm changes in the past.  Patient states that she was seeing a cardiologist in the past, Dr Das, but states that she hasn't been to see him in at least 3 years.  She reports she called the office and made an appointment to see Dr Wells but that appointment isnt until January 22nd.  Patient states that she was taking medication for her blood pressure but ran out a month ago.  Patient states she is also out of her MDI.          Emergency Department Nursing Plan of Care       The Nursing Plan of Care is developed from the Nursing assessment and Emergency Department Attending provider initial evaluation.  The plan of care may be reviewed in the “ED Provider note”.      The Plan of Care was developed with the following considerations:  Patient / Family readiness to learn indicated by:verbalized understanding  Persons(s) to be included in education: patient  Barriers to Learning/Limitations:None      Signed     Jeannette Couch RN    12/27/2024   5:00 PM

## 2024-12-28 LAB
BACTERIA SPEC CULT: NORMAL
SERVICE CMNT-IMP: NORMAL

## 2024-12-28 NOTE — ED NOTES
Discharge instructions were given to the patient by RONALD Matt.     The patient left the Emergency Department alert and oriented and in no acute distress with 5 prescriptions. The patient was encouraged to call or return to the ED for worsening issues or problems and was encouraged to schedule a follow up appointment for continuing care.     Ambulation assessment completed before discharge.  Pt left Emergency Department ambulating at baseline with no ortho devices  Ortho device education: none    The patient verbalized understanding of discharge instructions and prescriptions, all questions were answered. The patient has no further concerns at this time.

## 2024-12-28 NOTE — DISCHARGE INSTRUCTIONS
Thank You!    It was a pleasure taking care of you in our Emergency Department today. We know that when you come to [facility], you are entrusting us with your health, comfort, and safety. Our clinicians honor that trust, and truly appreciate the opportunity to care for you and your loved ones.    If you receive a survey about your Emergency Department experience today, please fill it out.  We value your feedback. Thank you.      Leslye Hogue PA-C    ___________________________________  I have included a copy of your lab results and/or radiologic studies from today's visit so you can have them easily available at your follow-up visit.   Recent Results (from the past 12 hour(s))   EKG 12 Lead    Collection Time: 12/27/24  4:07 PM   Result Value Ref Range    Ventricular Rate 78 BPM    Atrial Rate 78 BPM    P-R Interval 148 ms    QRS Duration 80 ms    Q-T Interval 392 ms    QTc Calculation (Bazett) 446 ms    P Axis 28 degrees    R Axis -15 degrees    T Axis 119 degrees    Diagnosis       Normal sinus rhythm  Possible Left atrial enlargement  Left ventricular hypertrophy with repolarization abnormality ( R in aVL ,   [place] product , Romhilt-Domínguez )  Cannot rule out Septal infarct (cited on or before 06-NOV-2016)  Abnormal ECG  When compared with ECG of 06-NOV-2016 17:50,  ST no longer elevated in Anterior leads     CBC with Auto Differential    Collection Time: 12/27/24  5:33 PM   Result Value Ref Range    WBC 9.3 3.6 - 11.0 K/uL    RBC 3.73 (L) 3.80 - 5.20 M/uL    Hemoglobin 7.1 (L) 11.5 - 16.0 g/dL    Hematocrit 25.8 (L) 35.0 - 47.0 %    MCV 69.2 (L) 80.0 - 99.0 FL    MCH 19.0 (L) 26.0 - 34.0 PG    MCHC 27.5 (L) 30.0 - 36.5 g/dL    RDW 29.3 (H) 11.5 - 14.5 %    Platelets 857 (HH) 150 - 400 K/uL    Nucleated RBCs 0.0 0  WBC    nRBC 0.00 0.00 - 0.01 K/uL    Neutrophils % 65 32 - 75 %    Lymphocytes % 26 12 - 49 %    Monocytes % 6 5 - 13 %    Eosinophils % 1 0 - 7 %

## 2024-12-30 LAB
EKG ATRIAL RATE: 78 BPM
EKG DIAGNOSIS: NORMAL
EKG P AXIS: 28 DEGREES
EKG P-R INTERVAL: 148 MS
EKG Q-T INTERVAL: 392 MS
EKG QRS DURATION: 80 MS
EKG QTC CALCULATION (BAZETT): 446 MS
EKG R AXIS: -15 DEGREES
EKG T AXIS: 119 DEGREES
EKG VENTRICULAR RATE: 78 BPM

## 2024-12-30 PROCEDURE — 93010 ELECTROCARDIOGRAM REPORT: CPT | Performed by: SPECIALIST
